# Patient Record
Sex: FEMALE | Race: WHITE | Employment: FULL TIME | URBAN - METROPOLITAN AREA
[De-identification: names, ages, dates, MRNs, and addresses within clinical notes are randomized per-mention and may not be internally consistent; named-entity substitution may affect disease eponyms.]

---

## 2021-03-31 ENCOUNTER — HOSPITAL ENCOUNTER (INPATIENT)
Age: 31
LOS: 11 days | Discharge: HOME | DRG: 177 | End: 2021-04-11
Attending: EMERGENCY MEDICINE | Admitting: INTERNAL MEDICINE
Payer: COMMERCIAL

## 2021-03-31 ENCOUNTER — APPOINTMENT (OUTPATIENT)
Dept: GENERAL RADIOLOGY | Age: 31
DRG: 177 | End: 2021-03-31
Attending: EMERGENCY MEDICINE
Payer: COMMERCIAL

## 2021-03-31 DIAGNOSIS — J45.41 MODERATE PERSISTENT ASTHMA WITH ACUTE EXACERBATION: Primary | ICD-10-CM

## 2021-03-31 DIAGNOSIS — U07.1 COVID-19 VIRUS INFECTION: ICD-10-CM

## 2021-03-31 DIAGNOSIS — R09.02 HYPOXIA: ICD-10-CM

## 2021-03-31 DIAGNOSIS — J18.9 PNEUMONIA OF BOTH LUNGS DUE TO INFECTIOUS ORGANISM, UNSPECIFIED PART OF LUNG: ICD-10-CM

## 2021-03-31 PROBLEM — F41.9 ANXIETY AND DEPRESSION: Status: ACTIVE | Noted: 2021-03-31

## 2021-03-31 PROBLEM — D69.6 THROMBOCYTOPENIA (HCC): Status: ACTIVE | Noted: 2021-03-31

## 2021-03-31 PROBLEM — E03.9 ACQUIRED HYPOTHYROIDISM: Status: ACTIVE | Noted: 2021-03-31

## 2021-03-31 PROBLEM — E87.6 HYPOKALEMIA: Status: ACTIVE | Noted: 2021-03-31

## 2021-03-31 PROBLEM — J45.909 ASTHMA: Status: ACTIVE | Noted: 2021-03-31

## 2021-03-31 PROBLEM — E66.9 OBESITY: Status: ACTIVE | Noted: 2021-03-31

## 2021-03-31 PROBLEM — E78.5 HYPERLIPIDEMIA: Status: ACTIVE | Noted: 2021-03-31

## 2021-03-31 PROBLEM — F32.A ANXIETY AND DEPRESSION: Status: ACTIVE | Noted: 2021-03-31

## 2021-03-31 PROBLEM — J12.82 PNEUMONIA DUE TO COVID-19 VIRUS: Status: ACTIVE | Noted: 2021-03-31

## 2021-03-31 LAB
ANION GAP SERPL CALC-SCNC: 8 MMOL/L (ref 5–15)
BASOPHILS # BLD: 0 K/UL (ref 0–0.1)
BASOPHILS NFR BLD: 0 % (ref 0–1)
BNP SERPL-MCNC: 23 PG/ML
BUN SERPL-MCNC: 11 MG/DL (ref 6–20)
BUN/CREAT SERPL: 11 (ref 12–20)
CALCIUM SERPL-MCNC: 8.4 MG/DL (ref 8.5–10.1)
CHLORIDE SERPL-SCNC: 103 MMOL/L (ref 97–108)
CO2 SERPL-SCNC: 24 MMOL/L (ref 21–32)
COMMENT, HOLDF: NORMAL
CREAT SERPL-MCNC: 1 MG/DL (ref 0.55–1.02)
DIFFERENTIAL METHOD BLD: ABNORMAL
EOSINOPHIL # BLD: 0 K/UL (ref 0–0.4)
EOSINOPHIL NFR BLD: 0 % (ref 0–7)
ERYTHROCYTE [DISTWIDTH] IN BLOOD BY AUTOMATED COUNT: 14 % (ref 11.5–14.5)
GLUCOSE SERPL-MCNC: 87 MG/DL (ref 65–100)
HCT VFR BLD AUTO: 39.3 % (ref 35–47)
HGB BLD-MCNC: 12.5 G/DL (ref 11.5–16)
IMM GRANULOCYTES # BLD AUTO: 0 K/UL (ref 0–0.04)
IMM GRANULOCYTES NFR BLD AUTO: 0 % (ref 0–0.5)
LYMPHOCYTES # BLD: 0.7 K/UL (ref 0.8–3.5)
LYMPHOCYTES NFR BLD: 19 % (ref 12–49)
MCH RBC QN AUTO: 26.7 PG (ref 26–34)
MCHC RBC AUTO-ENTMCNC: 31.8 G/DL (ref 30–36.5)
MCV RBC AUTO: 83.8 FL (ref 80–99)
MONOCYTES # BLD: 0.1 K/UL (ref 0–1)
MONOCYTES NFR BLD: 3 % (ref 5–13)
NEUTS SEG # BLD: 2.9 K/UL (ref 1.8–8)
NEUTS SEG NFR BLD: 78 % (ref 32–75)
NRBC # BLD: 0 K/UL (ref 0–0.01)
NRBC BLD-RTO: 0 PER 100 WBC
PLATELET # BLD AUTO: 141 K/UL (ref 150–400)
POTASSIUM SERPL-SCNC: 3.2 MMOL/L (ref 3.5–5.1)
PROCALCITONIN SERPL-MCNC: 0.05 NG/ML
RBC # BLD AUTO: 4.69 M/UL (ref 3.8–5.2)
RBC MORPH BLD: ABNORMAL
SAMPLES BEING HELD,HOLD: NORMAL
SODIUM SERPL-SCNC: 135 MMOL/L (ref 136–145)
TROPONIN I SERPL-MCNC: <0.05 NG/ML
TSH SERPL DL<=0.05 MIU/L-ACNC: 3.37 UIU/ML (ref 0.36–3.74)
WBC # BLD AUTO: 3.7 K/UL (ref 3.6–11)

## 2021-03-31 PROCEDURE — 65270000029 HC RM PRIVATE

## 2021-03-31 PROCEDURE — 84145 PROCALCITONIN (PCT): CPT

## 2021-03-31 PROCEDURE — 84443 ASSAY THYROID STIM HORMONE: CPT

## 2021-03-31 PROCEDURE — 99218 HC RM OBSERVATION: CPT

## 2021-03-31 PROCEDURE — 85025 COMPLETE CBC W/AUTO DIFF WBC: CPT

## 2021-03-31 PROCEDURE — 96374 THER/PROPH/DIAG INJ IV PUSH: CPT

## 2021-03-31 PROCEDURE — 83880 ASSAY OF NATRIURETIC PEPTIDE: CPT

## 2021-03-31 PROCEDURE — 36415 COLL VENOUS BLD VENIPUNCTURE: CPT

## 2021-03-31 PROCEDURE — 74011250637 HC RX REV CODE- 250/637: Performed by: EMERGENCY MEDICINE

## 2021-03-31 PROCEDURE — 71045 X-RAY EXAM CHEST 1 VIEW: CPT

## 2021-03-31 PROCEDURE — 3E0D73Z INTRODUCTION OF ANTI-INFLAMMATORY INTO MOUTH AND PHARYNX, VIA NATURAL OR ARTIFICIAL OPENING: ICD-10-PCS | Performed by: INTERNAL MEDICINE

## 2021-03-31 PROCEDURE — 93005 ELECTROCARDIOGRAM TRACING: CPT

## 2021-03-31 PROCEDURE — 74011250636 HC RX REV CODE- 250/636: Performed by: EMERGENCY MEDICINE

## 2021-03-31 PROCEDURE — 99285 EMERGENCY DEPT VISIT HI MDM: CPT

## 2021-03-31 PROCEDURE — 96375 TX/PRO/DX INJ NEW DRUG ADDON: CPT

## 2021-03-31 PROCEDURE — 80048 BASIC METABOLIC PNL TOTAL CA: CPT

## 2021-03-31 PROCEDURE — 74011000250 HC RX REV CODE- 250: Performed by: EMERGENCY MEDICINE

## 2021-03-31 PROCEDURE — 84484 ASSAY OF TROPONIN QUANT: CPT

## 2021-03-31 PROCEDURE — 74011636637 HC RX REV CODE- 636/637: Performed by: EMERGENCY MEDICINE

## 2021-03-31 RX ORDER — ENOXAPARIN SODIUM 100 MG/ML
30 INJECTION SUBCUTANEOUS EVERY 12 HOURS
Status: DISCONTINUED | OUTPATIENT
Start: 2021-04-01 | End: 2021-04-02

## 2021-03-31 RX ORDER — PHENTERMINE HYDROCHLORIDE 37.5 MG/1
37.5 TABLET ORAL
COMMUNITY

## 2021-03-31 RX ORDER — ZINC GLUCONATE 50 MG
50 TABLET ORAL DAILY
Status: DISCONTINUED | OUTPATIENT
Start: 2021-04-01 | End: 2021-04-11 | Stop reason: HOSPADM

## 2021-03-31 RX ORDER — ACETAMINOPHEN 325 MG/1
650 TABLET ORAL
Status: DISCONTINUED | OUTPATIENT
Start: 2021-03-31 | End: 2021-04-11 | Stop reason: HOSPADM

## 2021-03-31 RX ORDER — ALBUTEROL SULFATE 90 UG/1
2 AEROSOL, METERED RESPIRATORY (INHALATION)
COMMUNITY

## 2021-03-31 RX ORDER — HYDROXYZINE HYDROCHLORIDE 10 MG/1
10 TABLET, FILM COATED ORAL
Status: DISCONTINUED | OUTPATIENT
Start: 2021-03-31 | End: 2021-04-03

## 2021-03-31 RX ORDER — DEXAMETHASONE 6 MG/1
6 TABLET ORAL DAILY
Status: DISCONTINUED | OUTPATIENT
Start: 2021-04-01 | End: 2021-04-02

## 2021-03-31 RX ORDER — SODIUM CHLORIDE 0.9 % (FLUSH) 0.9 %
5-40 SYRINGE (ML) INJECTION AS NEEDED
Status: DISCONTINUED | OUTPATIENT
Start: 2021-03-31 | End: 2021-04-11 | Stop reason: HOSPADM

## 2021-03-31 RX ORDER — ACETAMINOPHEN 650 MG/1
650 SUPPOSITORY RECTAL
Status: DISCONTINUED | OUTPATIENT
Start: 2021-03-31 | End: 2021-04-11 | Stop reason: HOSPADM

## 2021-03-31 RX ORDER — PROMETHAZINE HYDROCHLORIDE 25 MG/1
12.5 TABLET ORAL
Status: DISCONTINUED | OUTPATIENT
Start: 2021-03-31 | End: 2021-04-11 | Stop reason: HOSPADM

## 2021-03-31 RX ORDER — LEVOTHYROXINE SODIUM 88 UG/1
88 TABLET ORAL
Status: DISCONTINUED | OUTPATIENT
Start: 2021-04-01 | End: 2021-04-04

## 2021-03-31 RX ORDER — ROSUVASTATIN CALCIUM 20 MG/1
20 TABLET, COATED ORAL DAILY
Status: ON HOLD | COMMUNITY
End: 2021-04-11 | Stop reason: SDUPTHER

## 2021-03-31 RX ORDER — KETOROLAC TROMETHAMINE 30 MG/ML
30 INJECTION, SOLUTION INTRAMUSCULAR; INTRAVENOUS ONCE
Status: COMPLETED | OUTPATIENT
Start: 2021-03-31 | End: 2021-03-31

## 2021-03-31 RX ORDER — HYDROXYZINE HYDROCHLORIDE 10 MG/1
10 TABLET, FILM COATED ORAL
COMMUNITY
End: 2021-04-16 | Stop reason: ALTCHOICE

## 2021-03-31 RX ORDER — HYDROCODONE POLISTIREX AND CHLORPHENIRAMINE POLISTIREX 10; 8 MG/5ML; MG/5ML
5 SUSPENSION, EXTENDED RELEASE ORAL
Status: COMPLETED | OUTPATIENT
Start: 2021-03-31 | End: 2021-03-31

## 2021-03-31 RX ORDER — ACETAMINOPHEN 325 MG/1
650 TABLET ORAL
Status: DISCONTINUED | OUTPATIENT
Start: 2021-03-31 | End: 2021-03-31 | Stop reason: SDUPTHER

## 2021-03-31 RX ORDER — IPRATROPIUM BROMIDE AND ALBUTEROL SULFATE 2.5; .5 MG/3ML; MG/3ML
3 SOLUTION RESPIRATORY (INHALATION)
COMMUNITY

## 2021-03-31 RX ORDER — PREDNISONE 20 MG/1
100 TABLET ORAL
Status: DISCONTINUED | OUTPATIENT
Start: 2021-04-01 | End: 2021-03-31

## 2021-03-31 RX ORDER — PREDNISONE 20 MG/1
60 TABLET ORAL ONCE
Status: COMPLETED | OUTPATIENT
Start: 2021-03-31 | End: 2021-03-31

## 2021-03-31 RX ORDER — ROSUVASTATIN CALCIUM 10 MG/1
20 TABLET, COATED ORAL DAILY
Status: DISCONTINUED | OUTPATIENT
Start: 2021-04-01 | End: 2021-04-11 | Stop reason: HOSPADM

## 2021-03-31 RX ORDER — ONDANSETRON 2 MG/ML
4 INJECTION INTRAMUSCULAR; INTRAVENOUS
Status: DISCONTINUED | OUTPATIENT
Start: 2021-03-31 | End: 2021-04-11 | Stop reason: HOSPADM

## 2021-03-31 RX ORDER — PHENTERMINE HYDROCHLORIDE 37.5 MG/1
37.5 CAPSULE ORAL
COMMUNITY
End: 2021-03-31

## 2021-03-31 RX ORDER — ASCORBIC ACID 500 MG
500 TABLET ORAL DAILY
Status: DISCONTINUED | OUTPATIENT
Start: 2021-04-01 | End: 2021-04-11 | Stop reason: HOSPADM

## 2021-03-31 RX ORDER — FLUTICASONE PROPIONATE AND SALMETEROL 500; 50 UG/1; UG/1
1 POWDER RESPIRATORY (INHALATION) EVERY 12 HOURS
COMMUNITY

## 2021-03-31 RX ORDER — LEVOTHYROXINE SODIUM 88 UG/1
88 TABLET ORAL
COMMUNITY

## 2021-03-31 RX ORDER — ACETAMINOPHEN 650 MG/1
650 SUPPOSITORY RECTAL
Status: DISCONTINUED | OUTPATIENT
Start: 2021-03-31 | End: 2021-03-31 | Stop reason: SDUPTHER

## 2021-03-31 RX ORDER — POTASSIUM CHLORIDE 750 MG/1
40 TABLET, FILM COATED, EXTENDED RELEASE ORAL
Status: COMPLETED | OUTPATIENT
Start: 2021-03-31 | End: 2021-03-31

## 2021-03-31 RX ORDER — ACETAMINOPHEN 500 MG
1000 TABLET ORAL ONCE
Status: COMPLETED | OUTPATIENT
Start: 2021-03-31 | End: 2021-03-31

## 2021-03-31 RX ORDER — SODIUM CHLORIDE 0.9 % (FLUSH) 0.9 %
5-40 SYRINGE (ML) INJECTION EVERY 8 HOURS
Status: DISCONTINUED | OUTPATIENT
Start: 2021-04-01 | End: 2021-04-11 | Stop reason: HOSPADM

## 2021-03-31 RX ORDER — POLYETHYLENE GLYCOL 3350 17 G/17G
17 POWDER, FOR SOLUTION ORAL DAILY PRN
Status: DISCONTINUED | OUTPATIENT
Start: 2021-03-31 | End: 2021-04-11 | Stop reason: HOSPADM

## 2021-03-31 RX ADMIN — CEFTRIAXONE 1 G: 1 INJECTION, POWDER, FOR SOLUTION INTRAMUSCULAR; INTRAVENOUS at 22:29

## 2021-03-31 RX ADMIN — KETOROLAC TROMETHAMINE 30 MG: 30 INJECTION, SOLUTION INTRAMUSCULAR at 19:06

## 2021-03-31 RX ADMIN — AZITHROMYCIN MONOHYDRATE 500 MG: 500 INJECTION, POWDER, LYOPHILIZED, FOR SOLUTION INTRAVENOUS at 22:36

## 2021-03-31 RX ADMIN — SODIUM CHLORIDE 1000 ML: 9 INJECTION, SOLUTION INTRAVENOUS at 19:09

## 2021-03-31 RX ADMIN — POTASSIUM CHLORIDE 40 MEQ: 750 TABLET, EXTENDED RELEASE ORAL at 20:21

## 2021-03-31 RX ADMIN — HYDROCODONE POLISTIREX AND CHLORPHENIRAMINE POLISTIREX 5 ML: 10; 8 SUSPENSION, EXTENDED RELEASE ORAL at 19:26

## 2021-03-31 RX ADMIN — PREDNISONE 60 MG: 20 TABLET ORAL at 20:20

## 2021-03-31 RX ADMIN — ACETAMINOPHEN 1000 MG: 500 TABLET, FILM COATED ORAL at 19:05

## 2021-03-31 NOTE — Clinical Note
Status[de-identified] INPATIENT [101]   Type of Bed: Telemetry [19]   Inpatient Hospitalization Certified Necessary for the Following Reasons: 3.  Patient receiving treatment that can only be provided in an inpatient setting (further clarification in H&P documentation)   Admitting Diagnosis: Pneumonia due to COVID-19 virus [[id number]]   Admitting Physician: Leni Valdivia, [address]   Attending Physician: Shawn Echeverria   Estimated Length of Stay: 3-4 Midnights   Discharge Plan[de-identified] Home with Office Follow-up

## 2021-03-31 NOTE — ED TRIAGE NOTES
Patient presents to ED via EMS for c/o chest discomfort. Pain is mid chest and nonradiating. Tested positive for COVID on Monday. Reports loss of taste/smell. Has used nebulizer with relief yesterday but not today.  Reports similar sx with pneumonia in past.

## 2021-04-01 PROBLEM — U07.1 COVID-19: Status: ACTIVE | Noted: 2021-04-01

## 2021-04-01 LAB
ANION GAP SERPL CALC-SCNC: 8 MMOL/L (ref 5–15)
BUN SERPL-MCNC: 12 MG/DL (ref 6–20)
BUN/CREAT SERPL: 13 (ref 12–20)
CALCIUM SERPL-MCNC: 8.5 MG/DL (ref 8.5–10.1)
CHLORIDE SERPL-SCNC: 105 MMOL/L (ref 97–108)
CO2 SERPL-SCNC: 23 MMOL/L (ref 21–32)
CREAT SERPL-MCNC: 0.9 MG/DL (ref 0.55–1.02)
CRP SERPL-MCNC: 6.75 MG/DL (ref 0–0.6)
D DIMER PPP FEU-MCNC: 0.76 MG/L FEU (ref 0–0.65)
ERYTHROCYTE [DISTWIDTH] IN BLOOD BY AUTOMATED COUNT: 14 % (ref 11.5–14.5)
FERRITIN SERPL-MCNC: 370 NG/ML (ref 26–388)
GLUCOSE SERPL-MCNC: 138 MG/DL (ref 65–100)
HCT VFR BLD AUTO: 42.2 % (ref 35–47)
HGB BLD-MCNC: 13.2 G/DL (ref 11.5–16)
LDH SERPL L TO P-CCNC: 348 U/L (ref 81–246)
MCH RBC QN AUTO: 26.5 PG (ref 26–34)
MCHC RBC AUTO-ENTMCNC: 31.3 G/DL (ref 30–36.5)
MCV RBC AUTO: 84.7 FL (ref 80–99)
NRBC # BLD: 0 K/UL (ref 0–0.01)
NRBC BLD-RTO: 0 PER 100 WBC
PLATELET # BLD AUTO: 146 K/UL (ref 150–400)
PMV BLD AUTO: 11 FL (ref 8.9–12.9)
POTASSIUM SERPL-SCNC: 4 MMOL/L (ref 3.5–5.1)
RBC # BLD AUTO: 4.98 M/UL (ref 3.8–5.2)
SARS-COV-2, COV2: NORMAL
SARS-COV-2, XPLCVT: DETECTED
SODIUM SERPL-SCNC: 136 MMOL/L (ref 136–145)
SOURCE, COVRS: ABNORMAL
TROPONIN I SERPL-MCNC: <0.05 NG/ML
WBC # BLD AUTO: 2.8 K/UL (ref 3.6–11)

## 2021-04-01 PROCEDURE — 96361 HYDRATE IV INFUSION ADD-ON: CPT

## 2021-04-01 PROCEDURE — 74011250637 HC RX REV CODE- 250/637: Performed by: INTERNAL MEDICINE

## 2021-04-01 PROCEDURE — 74011250636 HC RX REV CODE- 250/636: Performed by: INTERNAL MEDICINE

## 2021-04-01 PROCEDURE — 80048 BASIC METABOLIC PNL TOTAL CA: CPT

## 2021-04-01 PROCEDURE — 96376 TX/PRO/DX INJ SAME DRUG ADON: CPT

## 2021-04-01 PROCEDURE — 94640 AIRWAY INHALATION TREATMENT: CPT

## 2021-04-01 PROCEDURE — 65270000029 HC RM PRIVATE

## 2021-04-01 PROCEDURE — U0003 INFECTIOUS AGENT DETECTION BY NUCLEIC ACID (DNA OR RNA); SEVERE ACUTE RESPIRATORY SYNDROME CORONAVIRUS 2 (SARS-COV-2) (CORONAVIRUS DISEASE [COVID-19]), AMPLIFIED PROBE TECHNIQUE, MAKING USE OF HIGH THROUGHPUT TECHNOLOGIES AS DESCRIBED BY CMS-2020-01-R: HCPCS

## 2021-04-01 PROCEDURE — 99218 HC RM OBSERVATION: CPT

## 2021-04-01 PROCEDURE — 94664 DEMO&/EVAL PT USE INHALER: CPT

## 2021-04-01 PROCEDURE — 84484 ASSAY OF TROPONIN QUANT: CPT

## 2021-04-01 PROCEDURE — 93005 ELECTROCARDIOGRAM TRACING: CPT

## 2021-04-01 PROCEDURE — 86140 C-REACTIVE PROTEIN: CPT

## 2021-04-01 PROCEDURE — 36415 COLL VENOUS BLD VENIPUNCTURE: CPT

## 2021-04-01 PROCEDURE — 96372 THER/PROPH/DIAG INJ SC/IM: CPT

## 2021-04-01 PROCEDURE — 82728 ASSAY OF FERRITIN: CPT

## 2021-04-01 PROCEDURE — 74011250637 HC RX REV CODE- 250/637: Performed by: EMERGENCY MEDICINE

## 2021-04-01 PROCEDURE — 85027 COMPLETE CBC AUTOMATED: CPT

## 2021-04-01 PROCEDURE — 94618 PULMONARY STRESS TESTING: CPT

## 2021-04-01 PROCEDURE — 83615 LACTATE (LD) (LDH) ENZYME: CPT

## 2021-04-01 PROCEDURE — 85379 FIBRIN DEGRADATION QUANT: CPT

## 2021-04-01 RX ORDER — HYDROCODONE BITARTRATE AND ACETAMINOPHEN 5; 325 MG/1; MG/1
1 TABLET ORAL
Status: DISCONTINUED | OUTPATIENT
Start: 2021-04-01 | End: 2021-04-01

## 2021-04-01 RX ORDER — MORPHINE SULFATE 15 MG/1
15 TABLET ORAL
Status: DISCONTINUED | OUTPATIENT
Start: 2021-04-01 | End: 2021-04-11 | Stop reason: HOSPADM

## 2021-04-01 RX ORDER — HYDROCODONE POLISTIREX AND CHLORPHENIRAMINE POLISTIREX 10; 8 MG/5ML; MG/5ML
5 SUSPENSION, EXTENDED RELEASE ORAL
Status: DISCONTINUED | OUTPATIENT
Start: 2021-04-01 | End: 2021-04-01

## 2021-04-01 RX ORDER — KETOROLAC TROMETHAMINE 30 MG/ML
30 INJECTION, SOLUTION INTRAMUSCULAR; INTRAVENOUS EVERY 6 HOURS
Status: DISCONTINUED | OUTPATIENT
Start: 2021-04-01 | End: 2021-04-03

## 2021-04-01 RX ORDER — BENZONATATE 100 MG/1
100 CAPSULE ORAL
Status: DISCONTINUED | OUTPATIENT
Start: 2021-04-01 | End: 2021-04-11 | Stop reason: HOSPADM

## 2021-04-01 RX ORDER — SODIUM CHLORIDE 9 MG/ML
75 INJECTION, SOLUTION INTRAVENOUS CONTINUOUS
Status: DISPENSED | OUTPATIENT
Start: 2021-04-01 | End: 2021-04-02

## 2021-04-01 RX ORDER — GUAIFENESIN/DEXTROMETHORPHAN 100-10MG/5
5 SYRUP ORAL
Status: DISCONTINUED | OUTPATIENT
Start: 2021-04-01 | End: 2021-04-11 | Stop reason: HOSPADM

## 2021-04-01 RX ADMIN — OXYCODONE HYDROCHLORIDE AND ACETAMINOPHEN 500 MG: 500 TABLET ORAL at 09:33

## 2021-04-01 RX ADMIN — LEVOTHYROXINE SODIUM 88 MCG: 0.09 TABLET ORAL at 06:04

## 2021-04-01 RX ADMIN — IPRATROPIUM BROMIDE AND ALBUTEROL 1 PUFF: 20; 100 SPRAY, METERED RESPIRATORY (INHALATION) at 23:23

## 2021-04-01 RX ADMIN — DEXAMETHASONE 6 MG: 6 TABLET ORAL at 00:18

## 2021-04-01 RX ADMIN — Medication 50 MG: at 09:33

## 2021-04-01 RX ADMIN — ROSUVASTATIN CALCIUM 20 MG: 10 TABLET, COATED ORAL at 09:33

## 2021-04-01 RX ADMIN — ENOXAPARIN SODIUM 30 MG: 30 INJECTION SUBCUTANEOUS at 12:52

## 2021-04-01 RX ADMIN — KETOROLAC TROMETHAMINE 30 MG: 30 INJECTION, SOLUTION INTRAMUSCULAR at 18:51

## 2021-04-01 RX ADMIN — ENOXAPARIN SODIUM 30 MG: 30 INJECTION SUBCUTANEOUS at 00:18

## 2021-04-01 RX ADMIN — IPRATROPIUM BROMIDE AND ALBUTEROL 1 PUFF: 20; 100 SPRAY, METERED RESPIRATORY (INHALATION) at 07:55

## 2021-04-01 RX ADMIN — IPRATROPIUM BROMIDE AND ALBUTEROL 1 PUFF: 20; 100 SPRAY, METERED RESPIRATORY (INHALATION) at 11:54

## 2021-04-01 RX ADMIN — IPRATROPIUM BROMIDE AND ALBUTEROL 1 PUFF: 20; 100 SPRAY, METERED RESPIRATORY (INHALATION) at 15:45

## 2021-04-01 RX ADMIN — Medication 10 ML: at 20:52

## 2021-04-01 RX ADMIN — ACETAMINOPHEN 650 MG: 325 TABLET, FILM COATED ORAL at 17:55

## 2021-04-01 RX ADMIN — IPRATROPIUM BROMIDE AND ALBUTEROL 1 PUFF: 20; 100 SPRAY, METERED RESPIRATORY (INHALATION) at 05:43

## 2021-04-01 RX ADMIN — BENZONATATE 100 MG: 100 CAPSULE ORAL at 17:55

## 2021-04-01 RX ADMIN — Medication 10 ML: at 17:56

## 2021-04-01 RX ADMIN — SODIUM CHLORIDE 75 ML/HR: 9 INJECTION, SOLUTION INTRAVENOUS at 20:43

## 2021-04-01 RX ADMIN — HYDROCODONE BITARTRATE AND ACETAMINOPHEN 1 TABLET: 5; 325 TABLET ORAL at 18:51

## 2021-04-01 RX ADMIN — Medication 10 ML: at 06:05

## 2021-04-01 RX ADMIN — Medication 10 ML: at 00:19

## 2021-04-01 NOTE — H&P
[address]  [phone]    Admission History and Physical      NAME:  Akira Nguyen   :   1990   MRN:  [id number]     PCP:  Dennis Hernandez MD     Date of service:  3/31/2021         Subjective:     CHIEF COMPLAINT: Cough and shortness of breath    HISTORY OF PRESENT ILLNESS:     Ms. Candance Columbia is a 27 y.o.  female who is admitted with Covid pneumonia. Ms. Candance Columbia with past medical history of asthma, hyperlipidemia, anxiety/depression, hypothyroidism, obesity presented to ER complaining of chest tightness and shortness of breath which started about a week ago. Patient was tested positive for SARS-CoV-2 on 2021. Patient has been having shortness of breath and cough before she tested positive for SARS-CoV-2, which has been progressively worse. In ER, SaO2 is mostly in the mid 90s and occasionally low 90s. No past medical history on file. No past surgical history on file. Social History     Tobacco Use    Smoking status: Not on file   Substance Use Topics    Alcohol use: Not on file        No family history on file. Allergies   Allergen Reactions    Codeine Nausea and Vomiting        Prior to Admission medications    Medication Sig Start Date End Date Taking? Authorizing Provider   fluticasone propion-salmeteroL (Wixela Inhub) 500-50 mcg/dose diskus inhaler Take 1 Puff by inhalation every twelve (12) hours. Yes Provider, Historical   hydrOXYzine HCL (ATARAX) 10 mg tablet Take 10 mg by mouth two (2) times daily as needed for Anxiety. Yes Provider, Historical   albuterol-ipratropium (DUO-NEB) 2.5 mg-0.5 mg/3 ml nebu 3 mL by Nebulization route every four (4) hours as needed for Wheezing. Yes Provider, Historical   phentermine (ADIPEX-P) 37.5 mg tablet Take 37.5 mg by mouth every morning.    Yes Provider, Historical   levothyroxine (SYNTHROID) 88 mcg tablet Take 88 mcg by mouth Daily (before breakfast). Yes Provider, Historical   albuterol (PROVENTIL HFA, VENTOLIN HFA, PROAIR HFA) 90 mcg/actuation inhaler Take 2 Puffs by inhalation every six (6) hours as needed for Wheezing. Yes Provider, Historical   rosuvastatin calcium (CRESTOR PO) Take  by mouth daily.  Unknown dose   Yes Provider, Historical         Review of Systems:  (bold if positive, if negative)    Gen:  Eyes:  ENT:  CVS:  Pulm:  Cough, dyspneaGI:  :  MS:  Skin:  Psych:  Endo:  Hem:  Renal:  Neuro:            Objective:      VITALS:    Vital signs reviewed; most recent are:    Visit Vitals  /64   Pulse 97   Temp 99.2 °F (37.3 °C)   Resp 16   Ht 5' 3\" (1.6 m)   Wt 140.4 kg (309 lb 8.4 oz)   SpO2 91%   BMI 54.83 kg/m²     SpO2 Readings from Last 6 Encounters:   03/31/21 91%        No intake or output data in the 24 hours ending 03/31/21 2231         Exam:     Physical Exam:    Gen:  Well-developed, well-nourished, in no acute distress  HEENT:  Pink conjunctivae, PERRL, hearing intact to voice, moist mucous membranes  Neck:  Supple, without masses, thyroid non-tender  Resp:  No accessory muscle use, clear breath sounds without wheezes rales or rhonchi  Card:  No murmurs, normal S1, S2 without thrills, bruits or peripheral edema  Abd:  Soft, non-tender, non-distended, normoactive bowel sounds are present, no palpable organomegaly and no detectable hernias  Lymph:  No cervical or inguinal adenopathy  Musc:  No cyanosis or clubbing  Skin:  No rashes or ulcers, skin turgor is good  Neuro:  Cranial nerves are grossly intact, no focal motor weakness, follows commands appropriately  Psych:  Good insight, oriented to person, place and time, alert       Labs:    Recent Labs     03/31/21  1904   WBC 3.7   HGB 12.5   HCT 39.3   *     Recent Labs     03/31/21  1904   *   K 3.2*      CO2 24   GLU 87   BUN 11   CREA 1.00   CA 8.4*     No results found for: GLUCPOC  No results for input(s): PH, PCO2, PO2, HCO3, FIO2 in the last 72 hours. No results for input(s): INR, INREXT in the last 72 hours. Telemetry reviewed:   normal sinus rhythm       Assessment/Plan:    1. Pneumonia due to COVID-19 virus (3/31/2021). Admit to medical.  Currently not a candidate for remdesivir. Start dexamethasone, vitamin C, zinc and monitor clinically. If continues to be hypoxic may need to start remdesivir. Check inflammatory markers    2. Hypokalemia (3/31/2021). Replete    3. Hyperlipidemia (3/31/2021). Continue Crestor    4. Acquired hypothyroidism (3/31/2021). Continue Synthroid and check TSH    5. Thrombocytopenia ([facility] [address] (3/31/2021). Likely secondary to Covid. Monitor    6. Asthma (3/31/2021). Stable. Not wheezing. Start Combivent inhaler    7. Obesity (3/31/2021). Would benefit from weight loss    8. Anxiety and depression (3/31/2021). Continue Lexapro.          Previous medical records reviewed     Risk of deterioration: high      Total time spent with patient: 79 [facility] discussed with: Patient, Nursing Staff and >50% of time spent in counseling and coordination of care    Discussed:  Care Plan    Prophylaxis:  Lovenox    Probable Disposition:  Home w/Family           ___________________________________________________    Attending Physician: Thomas Paredes MD

## 2021-04-01 NOTE — PROGRESS NOTES
[facility]: MED RECONCILIATION    Comments/Recommendations:   Medication reconciliation completed by patient over the phone. Patient unsure of Crestor dose, pharmacy will confirm tomorrow with [facility], they are currently closed. Medications added:     Wixela inhaler  Hydroxyzine  Duo-Neb  Phentermine  Albuterol inhaler  Crestor  levothyroxine    Medications removed:    none    Medications adjusted:    none    Information obtained from: patient, Rx query    Allergies: Codeine    Prior to Admission Medications:     Medication Documentation Review Audit       Reviewed by Pipe Bradley (Pharmacist) on 03/31/21 at 2157      Medication Sig Documenting Provider Last Dose Status Taking? albuterol (PROVENTIL HFA, VENTOLIN HFA, PROAIR HFA) 90 mcg/actuation inhaler Take 2 Puffs by inhalation every six (6) hours as needed for Wheezing. Provider, Historical  Active Yes   albuterol-ipratropium (DUO-NEB) 2.5 mg-0.5 mg/3 ml nebu 3 mL by Nebulization route every four (4) hours as needed for Wheezing. Provider, Historical 3/31/2021 AM Active Yes   fluticasone propion-salmeteroL (Wixela Inhub) 500-50 mcg/dose diskus inhaler Take 1 Puff by inhalation every twelve (12) hours. Provider, Historical  Active Yes   hydrOXYzine HCL (ATARAX) 10 mg tablet Take 10 mg by mouth two (2) times daily as needed for Anxiety. Provider, Historical  Active Yes   levothyroxine (SYNTHROID) 88 mcg tablet Take 88 mcg by mouth Daily (before breakfast). Provider, Historical 3/30/2021 Active Yes   phentermine (ADIPEX-P) 37.5 mg tablet Take 37.5 mg by mouth every morning. Provider, Historical 3/24/2021 Active Yes           Med Note (PIPE ALICIA   Wed Mar 31, 2021  9:55 PM) Patient stopped taking when became sick, medication currently on hold, to follow-up with MD in [facility] prior to resuming. rosuvastatin calcium (CRESTOR PO) Take  by mouth daily.  Unknown dose Provider, Historical  Active Yes           Med Note (PIPE ALICIA   Wed Mar 31, 2021  9:57 PM) Pharmacy will follow-up with tomorrow to confirm dose with outpatient pharmacy.                      Thank you,   Pipe Alicia, PharmD, BCPS   Contact: [phone]

## 2021-04-01 NOTE — PROGRESS NOTES
Bedside and Verbal shift change report given to brian (oncoming nurse) by [employer] (offgoing nurse). Report included the following information SBAR, Kardex and MAR.

## 2021-04-01 NOTE — PROGRESS NOTES
Reason for Admission:  PNA due to COVID-19                    RUR Score: 9% LOW                  Plan for utilizing home health: Not appropriate at this time         PCP: First and Last name: Pt has a PCP back home in [place]- plans to follow up once she returns. CM inquired about getting pt set up with [facility] at time of discharge, pt agreeable if her insurance will cover it. CM reached out to [facility] and they are contacting patient to run benefits and set up a visit if insurance does cover. Current Advanced Directive/Advance Care Plan: Full Code- none on file, pt is not interested in arranging any at this time. Healthcare Decision Maker:   Updated to reflect information obtained                  Transition of Care Plan:                    Pt is a 27year old,  female, admitted with PNA due to COVD-19. CM spoke with pt via phone due to COVID-19 precautions- pt AOX4 confirming address, emergency contact and PCP. Pt states she lives in [place] and is completely independent with ADLS, drives at baseline and has no DME. Pt states she has not had HH or been to any SNF/IPR in the past. Pt states she has not been in the hospital in two plus years. Pt confirmed [facility]- states no problems affording or accessing medications. Pt states her Mom will be able to drive her home at time of discharge and as needed. Pt confirms address being [place]- states she has been up here in [facility] with her son, Mom and Dad as her Grandma  and they have been trying to clear her house out and staying there. Pt and pt's family are not able to return to [place] until pt is COVID-19 negative and not symptomatic, pt states her parents are now vaccinated and she is the only one who is sick. Pt wishes to return home with family at time of discharge. Pt remains on Med. Surg- COVID-19 positive being treated for PNA- anticipate pt to not have any needs at time of discharge- MARIA INES Blanchard updated and will continue to follow and assist as needed. Care Management Interventions  PCP Verified by CM:  Yes  Mode of Transport at Discharge: Self  Transition of Care Consult (CM Consult): Discharge Planning  MyChart Signup: No  Discharge Durable Medical Equipment: No  Physical Therapy Consult: No  Occupational Therapy Consult: No  Current Support Network: Relative's Home  Confirm Follow Up Transport: Family  The Patient and/or Patient Representative was Provided with a Choice of Provider and Agrees with the Discharge Plan?: Yes  Freedom of Choice List was Provided with Basic Dialogue that Supports the Patient's Individualized Plan of Care/Goals, Treatment Preferences and Shares the Quality Data Associated with the Providers?: Yes  Discharge Location  Discharge Placement: Home with family assistance     RIVAS Olivo, [address]

## 2021-04-01 NOTE — PROGRESS NOTES
04/01/21 1155   Chart and Patient  Assessment   Pulmonary History 3   Surgical History 0   Chest X-ray 2   Respiratory Pattern 0   Mental Status 0   Breath Sounds 2   Cough 0   Level of Activity/Mobility 0   Respiratory Assessment Total Score 7

## 2021-04-01 NOTE — PROGRESS NOTES
Bedside shift change report given to [employer] (oncoming nurse) by Jasmina Melissa (offgoing nurse). Report included the following information SBAR, Kardex, Intake/Output, MAR and Recent Results.

## 2021-04-01 NOTE — ACP (ADVANCE CARE PLANNING)
Advance Care Planning     Advance Care Planning Clinical Specialist  Conversation Note      Date of ACP Conversation: 3/31/2021    Conversation Conducted with:  Patient with Decision Making Capacity     ACP Clinical Specialist: [address] [place] [address] Decision Maker:    Current Designated Health Care Decision Maker:   Primary Decision Maker: Rick Bowles - Mother - [phone]    Primary Decision Maker: Marah Jeronimo - Father - [phone]      Pt declined to complete the rest of questionnaire due to medical stability stating her parents are aware of her wishes. Care Preferences    Hospitalization: \"If your health worsens and it becomes clear that your chance of recovery is unlikely, what would your preference be regarding hospitalization? \"    Choice:  [x]  The patient wants hospitalization  []  The patient prefers comfort-focused treatment without hospitalization. Ventilation: \"If you were in your present state of health and suddenly became very ill and were unable to breathe on your own, what would your preference be about the use of a ventilator (breathing machine) if it were available to you? \"      If patient would desire the use of a ventilator (breathing machine), answer \"yes\", if not \"no\":    \"If your health worsens and it becomes clear that your chance of recovery is unlikely, what would your preference be about the use of a ventilator (breathing machine) if it were available to you? \"     If patient would desire the use of a ventilator (breathing machine), answer \"yes\", if not \"no\"      Resuscitation  \"CPR works best to restart the heart when there is a sudden event, like a heart attack, in someone who is otherwise healthy. Unfortunately, CPR does not typically restart the heart for people who have serious health conditions or who are very sick. \"    \"In the event your heart stopped as a result of an underlying serious health condition, would you want attempts to be made to restart your heart (answer \"yes\" for attempt to resuscitate) or would you prefer a natural death (answer \"no\" for do not attempt to resuscitate)? \"     [x] Yes  [] No   Educated Patient / Abraham Singh regarding differences between Advance Directives and portable DNR orders.     Length of ACP Conversation in minutes:      Conversation Outcomes:  [] ACP discussion completed  [] Existing advance directive reviewed with patient; no changes to patient's previously recorded wishes   [] New Advance Directive completed   [] Portable Do Not Rescitate prepared for Provider review and signature  [] POLST/POST/MOLST/MOST prepared for Provider review and signature    Follow-up plan:    [] Schedule follow-up conversation to continue planning  [] Referred individual to Provider for additional questions/concerns   [] Advised patient/agent/surrogate to review completed ACP document and update if needed with changes in condition, patient preferences or care setting     [] This note routed to one or more involved healthcare providers    Dino Perez, MSW, [address]

## 2021-04-01 NOTE — PROGRESS NOTES
04/01/21 1445 04/01/21 1447 04/01/21 1449   RT Walking Oximetry   Stage Resting (Room Air) During Walk (Room Air) During Walk (Room Air)   SpO2 100 % 96 % 97 %   HR 94 bpm 120 bpm 129 bpm   Rate of Dyspnea 1 1 1   Symptoms Chest Pain  (coughing) Chest Pain  (coughing) Chest Pain;Dizziness  (coughing)   O2 Device None (Room air) None (Room air) None (Room air)      04/01/21 1451   RT Walking Oximetry   Stage After Walk   SpO2 95 %    bpm   Rate of Dyspnea 1   Symptoms   (coughing)   O2 Device None (Room air)

## 2021-04-01 NOTE — ED PROVIDER NOTES
Pt is a 26 yo female with hx of asthma who presents with wheezing, CP, SOB. Was diagnosed with COVID 19 earlier this week. Reports she has been hospitalized several times for asthma exacerbations, last episode was last year in [place]. Has been using her rescue inhaler and nebulizer at home. Helped yesterday but did not seem to be working today prompting ED visit. Does not have a pulse ox at home, she is visiting family and came to [facility] 3 weeks ago. C/o mid sternal CP, fevers. Has been taking tylenol at home for fevers. No past medical history on file. No past surgical history on file. No family history on file.     Social History     Socioeconomic History    Marital status:      Spouse name: Not on file    Number of children: Not on file    Years of education: Not on file    Highest education level: Not on file   Occupational History    Not on file   Social Needs    Financial resource strain: Not on file    Food insecurity     Worry: Not on file     Inability: Not on file    Transportation needs     Medical: Not on file     Non-medical: Not on file   Tobacco Use    Smoking status: Not on file   Substance and Sexual Activity    Alcohol use: Not on file    Drug use: Not on file    Sexual activity: Not on file   Lifestyle    Physical activity     Days per week: Not on file     Minutes per session: Not on file    Stress: Not on file   Relationships    Social connections     Talks on phone: Not on file     Gets together: Not on file     Attends Orthodox service: Not on file     Active member of club or organization: Not on file     Attends meetings of clubs or organizations: Not on file     Relationship status: Not on file    Intimate partner violence     Fear of current or ex partner: Not on file     Emotionally abused: Not on file     Physically abused: Not on file     Forced sexual activity: Not on file   Other Topics Concern    Not on file   Social History Narrative  Not on file         ALLERGIES: Codeine    Review of Systems   Constitutional: Positive for fatigue and fever. Negative for chills. HENT: Negative for drooling and nosebleeds. Eyes: Negative for pain and itching. Respiratory: Positive for cough, shortness of breath and wheezing. Negative for choking and stridor. Cardiovascular: Negative for leg swelling. Gastrointestinal: Negative for abdominal distention and rectal pain. Endocrine: Negative for heat intolerance and polyphagia. Genitourinary: Negative for enuresis and genital sores. Musculoskeletal: Negative for arthralgias and joint swelling. Skin: Negative for color change. Allergic/Immunologic: Negative for immunocompromised state. Neurological: Negative for tremors and speech difficulty. Hematological: Negative for adenopathy. Psychiatric/Behavioral: Negative for dysphoric mood and sleep disturbance. Vitals:    03/31/21 2000 03/31/21 2015 03/31/21 2030 03/31/21 2134   BP: 92/62 (!) 103/58 (!) 110/52    Pulse: 96 95 100    Resp: 15 16 20    Temp:       SpO2: 91%  93% (!) 89%   Weight:       Height:                Physical Exam  Vitals signs and nursing note reviewed. Constitutional:       Appearance: She is well-developed. She is ill-appearing. She is not toxic-appearing or diaphoretic. HENT:      Head: Normocephalic. Nose: Nose normal.      Mouth/Throat:      Mouth: Mucous membranes are moist.      Pharynx: Oropharynx is clear. Eyes:      Conjunctiva/sclera: Conjunctivae normal.   Neck:      Musculoskeletal: Normal range of motion and neck supple. Cardiovascular:      Rate and Rhythm: Regular rhythm. Heart sounds: Normal heart sounds. Pulmonary:      Effort: No respiratory distress. Breath sounds: Wheezing present. Comments: Mild increased WOB. Speaking in full sentences. Abdominal:      General: There is no distension. Palpations: Abdomen is soft.    Musculoskeletal: Normal range of motion. General: No deformity. Skin:     General: Skin is warm and dry. Neurological:      Mental Status: She is alert and oriented to person, place, and time. Coordination: Coordination normal.   Psychiatric:         Behavior: Behavior normal.          MDM  Number of Diagnoses or Management Options  COVID-19 virus infection  Hypoxia  Moderate persistent asthma with acute exacerbation  Pneumonia of both lungs due to infectious organism, unspecified part of lung  Diagnosis management comments: Pt with improved symptoms after meds in ED. Noted to be hypoxic and IV abx given with PNA noted. Received steroids for asthma exacerbation (pt reports she always needs steroids when her asthma acts up), and admitted for further management. ED Course as of Apr 01 1648   Wed Mar 31, 2021   2144 ED EKG interpretation:  Rhythm: normal sinus rhythm; and regular . Rate (approx.): 108; Axis: normal; ST/T wave: non-specific changes; No STEMI, occasional PVC. [AL]      ED Course User Index  [AL] Jessiac Sue MD       Procedures    Perfect Serve Consult for Admission  9:40 PM    ED Room Number: ZF20/05  Patient Name and age:  Kelby Rudd 27 y.o.  female  Working Diagnosis:   1. Moderate persistent asthma with acute exacerbation    2. COVID-19 virus infection    3. Pneumonia of both lungs due to infectious organism, unspecified part of lung    4. Hypoxia        COVID-19 Suspicion:  yes  Sepsis present:  no  Reassessment needed: no  Code Status:  Full Code  Readmission: no  Isolation Requirements:  yes  Recommended Level of Care:  telemetry  Department:[facility] ED - [phone]  Other:  Hx of prior hospitalizations for asthma. From [place]. Hypoxic with PNA on CXR. Patient is being admitted to the hospital.  The results of their tests and reasons for their admission have been discussed with them and/or available family.   They convey agreement and understanding for the need to be admitted and for their admission diagnosis.

## 2021-04-01 NOTE — ED NOTES
TRANSFER - OUT REPORT:    Verbal report given to 5th floor RN(name) on Freddy Curtis  being transferred to 526(unit) for routine progression of care       Report consisted of patients Situation, Background, Assessment and   Recommendations(SBAR). Information from the following report(s) SBAR, Kardex, ED Summary, MAR, Recent Results and Cardiac Rhythm NSR was reviewed with the receiving nurse. Lines:   Peripheral IV 03/31/21 Right Hand (Active)        Opportunity for questions and clarification was provided.       Patient transported with:   Registered Nurse

## 2021-04-01 NOTE — PROGRESS NOTES
Andrés Orellana [facility]  [address], [place], [address] [facility]  [phone]      Medical Progress Note      NAME: Marion Jacobs   :  1990  MRM:  [id number]    Date/Time: 2021  7:34 PM         Subjective:     Chief Complaint:  \"I feel bad\"     Pt seen and examined. Complains of chest pain 2/2 coughing. Little improvement with tessalon pearls. Though did not desat with eval for hypoxia, did have dizziness and chest pain as well as cough     ROS:  (bold if positive, if negative)      SOB   CP  Cough        Objective:       Vitals:          Last 24hrs VS reviewed since prior progress note. Most recent are:    Visit Vitals  BP (!) 164/66 (BP 1 Location: Left upper arm, BP Patient Position: At rest)   Pulse (!) 115   Temp (!) 102.5 °F (39.2 °C)   Resp 20   Ht 5' 3\" (1.6 m)   Wt 140.4 kg (309 lb 8.4 oz)   SpO2 94%   BMI 54.83 kg/m²     SpO2 Readings from Last 6 Encounters:   21 94%            Intake/Output Summary (Last 24 hours) at 2021 1934  Last data filed at 2021 1400  Gross per 24 hour   Intake 650 ml   Output 400 ml   Net 250 ml          Exam:     Physical Exam:    Gen: Morbidly obese   HEENT:  Pink conjunctivae, PERRL, hearing intact to voice, moist mucous membranes  Neck:  Supple, without masses, thyroid non-tender  Resp: Frequent episodes of coughing. Splinting.  Crackles   Card:  No murmurs, normal S1, S2 without thrills, bruits or peripheral edema  Abd:  Soft, non-tender, non-distended, normoactive bowel sounds are present  Musc:  No cyanosis or clubbing  Skin:  No rashes or ulcers, skin turgor is good  Neuro:  Cranial nerves 3-12 are grossly intact,  strength is 5/5 bilaterally and dorsi / plantarflexion is 5/5 bilaterally, follows commands appropriately  Psych:  Good insight, oriented to person, place and time, alert    Medications Reviewed: (see below)    Lab Data Reviewed: (see below)    ______________________________________________________________________    Medications:     Current Facility-Administered Medications   Medication Dose Route Frequency    ipratropium-albuterol (COMBIVENT RESPIMAT) 20 mcg-100 mcg inhalation spray  1 Puff Inhalation Q6HWA RT    benzonatate (TESSALON) capsule 100 mg  100 mg Oral TID PRN    ketorolac (TORADOL) injection 30 mg  30 mg IntraVENous Q6H    HYDROcodone-chlorpheniramine (TUSSIONEX) oral suspension 5 mL  5 mL Oral Q12H PRN    HYDROcodone-acetaminophen (NORCO) 5-325 mg per tablet 1 Tab  1 Tab Oral Q6H PRN    ipratropium-albuterol (COMBIVENT RESPIMAT) 20 mcg-100 mcg inhalation spray  1 Puff Inhalation Q4H PRN    hydrOXYzine HCL (ATARAX) tablet 10 mg  10 mg Oral BID PRN    levothyroxine (SYNTHROID) tablet 88 mcg  88 mcg Oral ACB    rosuvastatin (CRESTOR) tablet 20 mg  20 mg Oral DAILY    sodium chloride (NS) flush 5-40 mL  5-40 mL IntraVENous Q8H    sodium chloride (NS) flush 5-40 mL  5-40 mL IntraVENous PRN    acetaminophen (TYLENOL) tablet 650 mg  650 mg Oral Q6H PRN    Or    acetaminophen (TYLENOL) suppository 650 mg  650 mg Rectal Q6H PRN    polyethylene glycol (MIRALAX) packet 17 g  17 g Oral DAILY PRN    promethazine (PHENERGAN) tablet 12.5 mg  12.5 mg Oral Q6H PRN    Or    ondansetron (ZOFRAN) injection 4 mg  4 mg IntraVENous Q6H PRN    enoxaparin (LOVENOX) injection 30 mg  30 mg SubCUTAneous Q12H    dexAMETHasone (DECADRON) tablet 6 mg  6 mg Oral DAILY    ascorbic acid (vitamin C) (VITAMIN C) tablet 500 mg  500 mg Oral DAILY    zinc gluconate tablet 50 mg  50 mg Oral DAILY            Lab Review:     Recent Labs     04/01/21  0535 03/31/21  1904   WBC 2.8* 3.7   HGB 13.2 12.5   HCT 42.2 39.3   * 141*     Recent Labs     04/01/21  0535 03/31/21  1904    135*   K 4.0 3.2*    103   CO2 23 24   * 87   BUN 12 11   CREA 0.90 1.00   CA 8.5 8.4*     No components found for: [place]         Assessment / Plan: Pneumonia due to COVID-19 virus (3/31/2021) - NOT hypoxic  -on decadron   -hold on antibiotics as procalcitonin low   -vitamin C/zinc  -combivent   -remdesivir if clinically worsens   -trend inflammatory markers   -OOB, incentive spirometry, ambulate       Hypokalemia (3/31/2021)  -repleted       Thrombocytopenia ([address] (3/31/2021) - likely 2/2 viral process   -monitor       Obesity (3/31/2021) - morbid   -negative prognostic indicator for COVID recovery      Asthma (3/31/2021)  -on Combivent for now due to COVID status       Hyperlipidemia (3/31/2021)  -statin       Acquired hypothyroidism (3/31/2021)  -on levothyroxine      Anxiety and depression (3/31/2021)  -atarax PRN       COVID-19 (4/1/2021)  -see above     Chest pain - likely MSK from coughing. Intercostal and reproducible.  Also with COVID PNA   -trend d-dimer, low threshold for CTA if uptrends or pt becomes hypoxic   -repeat troponin   -check EKG as does not appear pt has an admission EKG   -start toradol   -PRN cough meds    Total time spent with patient: 35 minutes             Care Plan discussed with: Patient     Discussed: Care plan     Prophylaxis:  Lovenox     Disposition: Home       ___________________________________________________    Attending Physician: Kandis Cheadle, MD

## 2021-04-02 ENCOUNTER — APPOINTMENT (OUTPATIENT)
Dept: CT IMAGING | Age: 31
DRG: 177 | End: 2021-04-02
Attending: INTERNAL MEDICINE
Payer: COMMERCIAL

## 2021-04-02 LAB
ALBUMIN SERPL-MCNC: 3.3 G/DL (ref 3.5–5)
ALBUMIN/GLOB SERPL: 0.9 {RATIO} (ref 1.1–2.2)
ALP SERPL-CCNC: 78 U/L (ref 45–117)
ALT SERPL-CCNC: 87 U/L (ref 12–78)
ANION GAP SERPL CALC-SCNC: 7 MMOL/L (ref 5–15)
AST SERPL-CCNC: 66 U/L (ref 15–37)
ATRIAL RATE: 108 BPM
ATRIAL RATE: 93 BPM
BASOPHILS # BLD: 0 K/UL (ref 0–0.1)
BASOPHILS NFR BLD: 0 % (ref 0–1)
BILIRUB DIRECT SERPL-MCNC: 0.4 MG/DL (ref 0–0.2)
BILIRUB SERPL-MCNC: 0.6 MG/DL (ref 0.2–1)
BNP SERPL-MCNC: 114 PG/ML
BUN SERPL-MCNC: 12 MG/DL (ref 6–20)
BUN/CREAT SERPL: 12 (ref 12–20)
CALCIUM SERPL-MCNC: 8.3 MG/DL (ref 8.5–10.1)
CALCULATED P AXIS, ECG09: 12 DEGREES
CALCULATED P AXIS, ECG09: 40 DEGREES
CALCULATED R AXIS, ECG10: 10 DEGREES
CALCULATED R AXIS, ECG10: 18 DEGREES
CALCULATED T AXIS, ECG11: 33 DEGREES
CALCULATED T AXIS, ECG11: 6 DEGREES
CHLORIDE SERPL-SCNC: 106 MMOL/L (ref 97–108)
CO2 SERPL-SCNC: 23 MMOL/L (ref 21–32)
CREAT SERPL-MCNC: 1.04 MG/DL (ref 0.55–1.02)
CRP SERPL-MCNC: 18.1 MG/DL (ref 0–0.6)
D DIMER PPP FEU-MCNC: 0.82 MG/L FEU (ref 0–0.65)
DIAGNOSIS, 93000: NORMAL
DIAGNOSIS, 93000: NORMAL
DIFFERENTIAL METHOD BLD: ABNORMAL
EOSINOPHIL # BLD: 0 K/UL (ref 0–0.4)
EOSINOPHIL NFR BLD: 0 % (ref 0–7)
ERYTHROCYTE [DISTWIDTH] IN BLOOD BY AUTOMATED COUNT: 14.1 % (ref 11.5–14.5)
GLOBULIN SER CALC-MCNC: 3.8 G/DL (ref 2–4)
GLUCOSE SERPL-MCNC: 86 MG/DL (ref 65–100)
HCT VFR BLD AUTO: 37.5 % (ref 35–47)
HGB BLD-MCNC: 11.8 G/DL (ref 11.5–16)
IMM GRANULOCYTES # BLD AUTO: 0 K/UL (ref 0–0.04)
IMM GRANULOCYTES NFR BLD AUTO: 0 % (ref 0–0.5)
LYMPHOCYTES # BLD: 0.7 K/UL (ref 0.8–3.5)
LYMPHOCYTES NFR BLD: 14 % (ref 12–49)
MCH RBC QN AUTO: 27 PG (ref 26–34)
MCHC RBC AUTO-ENTMCNC: 31.5 G/DL (ref 30–36.5)
MCV RBC AUTO: 85.8 FL (ref 80–99)
MONOCYTES # BLD: 0.1 K/UL (ref 0–1)
MONOCYTES NFR BLD: 2 % (ref 5–13)
NEUTS SEG # BLD: 3.9 K/UL (ref 1.8–8)
NEUTS SEG NFR BLD: 83 % (ref 32–75)
NRBC # BLD: 0 K/UL (ref 0–0.01)
NRBC BLD-RTO: 0 PER 100 WBC
P-R INTERVAL, ECG05: 186 MS
P-R INTERVAL, ECG05: 218 MS
PLATELET # BLD AUTO: 189 K/UL (ref 150–400)
PMV BLD AUTO: 10.5 FL (ref 8.9–12.9)
POTASSIUM SERPL-SCNC: 3.5 MMOL/L (ref 3.5–5.1)
PROCALCITONIN SERPL-MCNC: 0.2 NG/ML
PROT SERPL-MCNC: 7.1 G/DL (ref 6.4–8.2)
Q-T INTERVAL, ECG07: 306 MS
Q-T INTERVAL, ECG07: 358 MS
QRS DURATION, ECG06: 74 MS
QRS DURATION, ECG06: 76 MS
QTC CALCULATION (BEZET), ECG08: 410 MS
QTC CALCULATION (BEZET), ECG08: 445 MS
RBC # BLD AUTO: 4.37 M/UL (ref 3.8–5.2)
SODIUM SERPL-SCNC: 136 MMOL/L (ref 136–145)
VENTRICULAR RATE, ECG03: 108 BPM
VENTRICULAR RATE, ECG03: 93 BPM
WBC # BLD AUTO: 4.7 K/UL (ref 3.6–11)

## 2021-04-02 PROCEDURE — 96372 THER/PROPH/DIAG INJ SC/IM: CPT

## 2021-04-02 PROCEDURE — 96376 TX/PRO/DX INJ SAME DRUG ADON: CPT

## 2021-04-02 PROCEDURE — 74011250637 HC RX REV CODE- 250/637: Performed by: INTERNAL MEDICINE

## 2021-04-02 PROCEDURE — 80048 BASIC METABOLIC PNL TOTAL CA: CPT

## 2021-04-02 PROCEDURE — 99218 HC RM OBSERVATION: CPT

## 2021-04-02 PROCEDURE — 85379 FIBRIN DEGRADATION QUANT: CPT

## 2021-04-02 PROCEDURE — 86140 C-REACTIVE PROTEIN: CPT

## 2021-04-02 PROCEDURE — 96375 TX/PRO/DX INJ NEW DRUG ADDON: CPT

## 2021-04-02 PROCEDURE — 94760 N-INVAS EAR/PLS OXIMETRY 1: CPT

## 2021-04-02 PROCEDURE — 80076 HEPATIC FUNCTION PANEL: CPT

## 2021-04-02 PROCEDURE — 74011000258 HC RX REV CODE- 258: Performed by: INTERNAL MEDICINE

## 2021-04-02 PROCEDURE — 74011250636 HC RX REV CODE- 250/636: Performed by: INTERNAL MEDICINE

## 2021-04-02 PROCEDURE — 85025 COMPLETE CBC W/AUTO DIFF WBC: CPT

## 2021-04-02 PROCEDURE — 83880 ASSAY OF NATRIURETIC PEPTIDE: CPT

## 2021-04-02 PROCEDURE — 96361 HYDRATE IV INFUSION ADD-ON: CPT

## 2021-04-02 PROCEDURE — 36415 COLL VENOUS BLD VENIPUNCTURE: CPT

## 2021-04-02 PROCEDURE — 71275 CT ANGIOGRAPHY CHEST: CPT

## 2021-04-02 PROCEDURE — 77030027138 HC INCENT SPIROMETER -A

## 2021-04-02 PROCEDURE — 74011000250 HC RX REV CODE- 250: Performed by: INTERNAL MEDICINE

## 2021-04-02 PROCEDURE — 77010033711 HC HIGH FLOW OXYGEN

## 2021-04-02 PROCEDURE — 84145 PROCALCITONIN (PCT): CPT

## 2021-04-02 PROCEDURE — 74011000636 HC RX REV CODE- 636: Performed by: RADIOLOGY

## 2021-04-02 PROCEDURE — 94640 AIRWAY INHALATION TREATMENT: CPT

## 2021-04-02 PROCEDURE — 65270000029 HC RM PRIVATE

## 2021-04-02 PROCEDURE — XW033E5 INTRODUCTION OF REMDESIVIR ANTI-INFECTIVE INTO PERIPHERAL VEIN, PERCUTANEOUS APPROACH, NEW TECHNOLOGY GROUP 5: ICD-10-PCS | Performed by: INTERNAL MEDICINE

## 2021-04-02 PROCEDURE — 77010033678 HC OXYGEN DAILY

## 2021-04-02 RX ORDER — ALBUTEROL SULFATE 90 UG/1
2 AEROSOL, METERED RESPIRATORY (INHALATION)
Status: DISCONTINUED | OUTPATIENT
Start: 2021-04-02 | End: 2021-04-04

## 2021-04-02 RX ORDER — ENOXAPARIN SODIUM 100 MG/ML
60 INJECTION SUBCUTANEOUS EVERY 12 HOURS
Status: DISCONTINUED | OUTPATIENT
Start: 2021-04-02 | End: 2021-04-11 | Stop reason: HOSPADM

## 2021-04-02 RX ORDER — GUAIFENESIN 600 MG/1
1200 TABLET, EXTENDED RELEASE ORAL 2 TIMES DAILY
Status: DISCONTINUED | OUTPATIENT
Start: 2021-04-02 | End: 2021-04-11 | Stop reason: HOSPADM

## 2021-04-02 RX ORDER — HYDROCODONE POLISTIREX AND CHLORPHENIRAMINE POLISTIREX 10; 8 MG/5ML; MG/5ML
5 SUSPENSION, EXTENDED RELEASE ORAL
Status: DISCONTINUED | OUTPATIENT
Start: 2021-04-02 | End: 2021-04-11 | Stop reason: HOSPADM

## 2021-04-02 RX ORDER — BUDESONIDE AND FORMOTEROL FUMARATE DIHYDRATE 160; 4.5 UG/1; UG/1
2 AEROSOL RESPIRATORY (INHALATION)
Status: DISCONTINUED | OUTPATIENT
Start: 2021-04-02 | End: 2021-04-04

## 2021-04-02 RX ORDER — ENOXAPARIN SODIUM 100 MG/ML
40 INJECTION SUBCUTANEOUS EVERY 12 HOURS
Status: DISCONTINUED | OUTPATIENT
Start: 2021-04-02 | End: 2021-04-02

## 2021-04-02 RX ORDER — DEXAMETHASONE SODIUM PHOSPHATE 4 MG/ML
6 INJECTION, SOLUTION INTRA-ARTICULAR; INTRALESIONAL; INTRAMUSCULAR; INTRAVENOUS; SOFT TISSUE EVERY 24 HOURS
Status: DISCONTINUED | OUTPATIENT
Start: 2021-04-03 | End: 2021-04-02

## 2021-04-02 RX ADMIN — SODIUM CHLORIDE 75 ML/HR: 9 INJECTION, SOLUTION INTRAVENOUS at 18:11

## 2021-04-02 RX ADMIN — REMDESIVIR 200 MG: 100 INJECTION, POWDER, LYOPHILIZED, FOR SOLUTION INTRAVENOUS at 11:08

## 2021-04-02 RX ADMIN — METHYLPREDNISOLONE SODIUM SUCCINATE 40 MG: 40 INJECTION, POWDER, FOR SOLUTION INTRAMUSCULAR; INTRAVENOUS at 23:20

## 2021-04-02 RX ADMIN — KETOROLAC TROMETHAMINE 30 MG: 30 INJECTION, SOLUTION INTRAMUSCULAR at 11:07

## 2021-04-02 RX ADMIN — Medication 10 ML: at 06:27

## 2021-04-02 RX ADMIN — IPRATROPIUM BROMIDE AND ALBUTEROL 1 PUFF: 20; 100 SPRAY, METERED RESPIRATORY (INHALATION) at 13:27

## 2021-04-02 RX ADMIN — MORPHINE SULFATE 15 MG: 15 TABLET ORAL at 23:34

## 2021-04-02 RX ADMIN — GUAIFENESIN AND DEXTROMETHORPHAN 5 ML: 100; 10 SYRUP ORAL at 13:34

## 2021-04-02 RX ADMIN — BENZONATATE 100 MG: 100 CAPSULE ORAL at 02:14

## 2021-04-02 RX ADMIN — BENZONATATE 100 MG: 100 CAPSULE ORAL at 21:26

## 2021-04-02 RX ADMIN — KETOROLAC TROMETHAMINE 30 MG: 30 INJECTION, SOLUTION INTRAMUSCULAR at 18:03

## 2021-04-02 RX ADMIN — LEVOTHYROXINE SODIUM 88 MCG: 0.09 TABLET ORAL at 06:27

## 2021-04-02 RX ADMIN — METHYLPREDNISOLONE SODIUM SUCCINATE 40 MG: 40 INJECTION, POWDER, FOR SOLUTION INTRAMUSCULAR; INTRAVENOUS at 18:26

## 2021-04-02 RX ADMIN — KETOROLAC TROMETHAMINE 30 MG: 30 INJECTION, SOLUTION INTRAMUSCULAR at 23:20

## 2021-04-02 RX ADMIN — ENOXAPARIN SODIUM 30 MG: 30 INJECTION SUBCUTANEOUS at 11:08

## 2021-04-02 RX ADMIN — HYDROCODONE POLISTIREX AND CHLORPHENIRAMINE POLISTIREX 5 ML: 10; 8 SUSPENSION, EXTENDED RELEASE ORAL at 09:32

## 2021-04-02 RX ADMIN — HYDROCODONE POLISTIREX AND CHLORPHENIRAMINE POLISTIREX 5 ML: 10; 8 SUSPENSION, EXTENDED RELEASE ORAL at 21:26

## 2021-04-02 RX ADMIN — TOCILIZUMAB 800 MG: 20 INJECTION, SOLUTION, CONCENTRATE INTRAVENOUS at 19:25

## 2021-04-02 RX ADMIN — IPRATROPIUM BROMIDE AND ALBUTEROL 1 PUFF: 20; 100 SPRAY, METERED RESPIRATORY (INHALATION) at 21:26

## 2021-04-02 RX ADMIN — Medication 10 ML: at 21:26

## 2021-04-02 RX ADMIN — DEXAMETHASONE 6 MG: 6 TABLET ORAL at 09:33

## 2021-04-02 RX ADMIN — Medication 10 ML: at 18:05

## 2021-04-02 RX ADMIN — Medication 10 ML: at 13:34

## 2021-04-02 RX ADMIN — ENOXAPARIN SODIUM 60 MG: 60 INJECTION SUBCUTANEOUS at 21:25

## 2021-04-02 RX ADMIN — IPRATROPIUM BROMIDE AND ALBUTEROL 1 PUFF: 20; 100 SPRAY, METERED RESPIRATORY (INHALATION) at 07:57

## 2021-04-02 RX ADMIN — KETOROLAC TROMETHAMINE 30 MG: 30 INJECTION, SOLUTION INTRAMUSCULAR at 00:06

## 2021-04-02 RX ADMIN — ONDANSETRON 4 MG: 2 INJECTION INTRAMUSCULAR; INTRAVENOUS at 00:06

## 2021-04-02 RX ADMIN — ROSUVASTATIN CALCIUM 20 MG: 10 TABLET, COATED ORAL at 09:33

## 2021-04-02 RX ADMIN — ENOXAPARIN SODIUM 30 MG: 30 INJECTION SUBCUTANEOUS at 00:06

## 2021-04-02 RX ADMIN — OXYCODONE HYDROCHLORIDE AND ACETAMINOPHEN 500 MG: 500 TABLET ORAL at 09:33

## 2021-04-02 RX ADMIN — MORPHINE SULFATE 15 MG: 15 TABLET ORAL at 02:14

## 2021-04-02 RX ADMIN — Medication 50 MG: at 09:33

## 2021-04-02 RX ADMIN — BUDESONIDE AND FORMOTEROL FUMARATE DIHYDRATE 2 PUFF: 160; 4.5 AEROSOL RESPIRATORY (INHALATION) at 23:20

## 2021-04-02 RX ADMIN — GUAIFENESIN 1200 MG: 600 TABLET, EXTENDED RELEASE ORAL at 18:25

## 2021-04-02 RX ADMIN — KETOROLAC TROMETHAMINE 30 MG: 30 INJECTION, SOLUTION INTRAMUSCULAR at 06:27

## 2021-04-02 RX ADMIN — IOPAMIDOL 100 ML: 755 INJECTION, SOLUTION INTRAVENOUS at 09:48

## 2021-04-02 RX ADMIN — ACETAMINOPHEN 650 MG: 325 TABLET, FILM COATED ORAL at 06:46

## 2021-04-02 RX ADMIN — GUAIFENESIN AND DEXTROMETHORPHAN 5 ML: 100; 10 SYRUP ORAL at 00:10

## 2021-04-02 NOTE — PROGRESS NOTES
Bedside and Verbal shift change report given to ONELIA Clement (oncoming nurse) by Ely Motta RN and Jose Worthington RN (offgoing nurse). Report included the following information SBAR, Kardex, Intake/Output, MAR and Recent Results.

## 2021-04-02 NOTE — CONSULTS
Name: [facility]: [address]   : 1990 Admit Date: 3/31/2021   Phone: [phone]  Room: [facility]/01   PCP: Magda Acevedo MD  MRN: [id number]   Date: 2021  Code: Full Code        HPI:      Chart and notes reviewed. Data reviewed. I review the patient's current medications in the medical record at each encounter.  I have evaluated and examined the patient. 10:59 AM       History was obtained from patient. I was asked by Pawel De Leon MD to see Graham Gomezglo in consultation for a chief complaint of Covid-19, new hypoxia. History of Present Illness:   is a very pleasant, 26 yo lady with a PMH of asthma, hyperlipidemia, anxiety/depression, hypothyroidism, and obesity that presented to [facility] on 3/31 with worsening shortness of breath. Reports that her symptoms initially began on 3/22, but did not become severe until a few days ago. She is visiting from [place] and was taking care of her grandmother on hospice who passed away 1 week ago. She states that overnight, her symptoms became worse and her SOB became worse. She finds it difficult to take a deep breath. Denies fever or chills. No significant cough. No CP, LE pain, or swelling. She has a history of asthma and is followed by a pulmonologist in [place]. Typically her asthma is well controlled on Wixela 500/50. When she is well, she never uses rescue agents or her nebulizer. Rarely exacerbates. Nonsmoker. Yesterday, she was on RA and this morning desaturated; now on 7L. Images:  Personally reviewed. Chest CTA:   No PE. Diffuse bilateral infiltrates/conslidation. WBC 4.7  PCT . 20  D-dimer . 82  Pro-  CRP : 6.75      No past medical history on file. No past surgical history on file. No family history on file.     Social History     Tobacco Use    Smoking status: Not on file   Substance Use Topics    Alcohol use: Not on file       Allergies   Allergen Reactions  Codeine Nausea and Vomiting       Current Facility-Administered Medications   Medication Dose Route Frequency    HYDROcodone-chlorpheniramine (TUSSIONEX) oral suspension 5 mL  5 mL Oral Q12H PRN    remdesivir 200 mg in 0.9% sodium chloride 250 mL IVPB  200 mg IntraVENous ONCE    Followed by   Milan Sanchez ON 4/3/2021] remdesivir 100 mg in 0.9% sodium chloride 250 mL IVPB  100 mg IntraVENous Q24H    ipratropium-albuterol (COMBIVENT RESPIMAT) 20 mcg-100 mcg inhalation spray  1 Puff Inhalation Q6HWA RT    benzonatate (TESSALON) capsule 100 mg  100 mg Oral TID PRN    ketorolac (TORADOL) injection 30 mg  30 mg IntraVENous Q6H    guaiFENesin-dextromethorphan (ROBITUSSIN DM) 100-10 mg/5 mL syrup 5 mL  5 mL Oral Q6H PRN    morphine IR (MS IR) tablet 15 mg  15 mg Oral Q4H PRN    0.9% sodium chloride infusion  75 mL/hr IntraVENous CONTINUOUS    ipratropium-albuterol (COMBIVENT RESPIMAT) 20 mcg-100 mcg inhalation spray  1 Puff Inhalation Q4H PRN    hydrOXYzine HCL (ATARAX) tablet 10 mg  10 mg Oral BID PRN    levothyroxine (SYNTHROID) tablet 88 mcg  88 mcg Oral ACB    rosuvastatin (CRESTOR) tablet 20 mg  20 mg Oral DAILY    sodium chloride (NS) flush 5-40 mL  5-40 mL IntraVENous Q8H    sodium chloride (NS) flush 5-40 mL  5-40 mL IntraVENous PRN    acetaminophen (TYLENOL) tablet 650 mg  650 mg Oral Q6H PRN    Or    acetaminophen (TYLENOL) suppository 650 mg  650 mg Rectal Q6H PRN    polyethylene glycol (MIRALAX) packet 17 g  17 g Oral DAILY PRN    promethazine (PHENERGAN) tablet 12.5 mg  12.5 mg Oral Q6H PRN    Or    ondansetron (ZOFRAN) injection 4 mg  4 mg IntraVENous Q6H PRN    enoxaparin (LOVENOX) injection 30 mg  30 mg SubCUTAneous Q12H    dexAMETHasone (DECADRON) tablet 6 mg  6 mg Oral DAILY    ascorbic acid (vitamin C) (VITAMIN C) tablet 500 mg  500 mg Oral DAILY    zinc gluconate tablet 50 mg  50 mg Oral DAILY         REVIEW OF SYSTEMS   Negative except as stated in the HPI.       Physical Exam: Visit Vitals  /88 (BP 1 Location: Right arm, BP Patient Position: At rest)   Pulse (!) 111   Temp 98.4 °F (36.9 °C)   Resp 18   Ht 5' 3\" (1.6 m)   Wt 140.4 kg (309 lb 8.4 oz)   SpO2 92%   BMI 54.83 kg/m²       General:  Alert, ill appearing, cooperative, no distress, dyspneic with movement/talking. Head:  Normocephalic, without obvious abnormality, atraumatic. Eyes:  Conjunctivae/corneas clear. Nose: Nares normal. Septum midline. Mucosa normal.    Throat: Lips, mucosa, and tongue normal.    Neck: Supple, symmetrical, trachea midline, no adenopathy   Lungs:   Distant bs   Chest wall:  No tenderness or deformity. Heart:  Regular rate and rhythm, S1, S2 normal, no murmur, click, rub or gallop. Abdomen:   Soft, non-tender. Bowel sounds normal. Abdomen obese. Extremities: Extremities normal, atraumatic, no cyanosis or edema. Pulses: 2+ and symmetric all extremities.    Skin: Skin color, texture, turgor normal.   Lymph nodes: Cervical  normal.   Neurologic: Grossly nonfocal       Lab Results   Component Value Date/Time    Sodium 136 04/02/2021 06:30 AM    Potassium 3.5 04/02/2021 06:30 AM    Chloride 106 04/02/2021 06:30 AM    CO2 23 04/02/2021 06:30 AM    BUN 12 04/02/2021 06:30 AM    Creatinine 1.04 (H) 04/02/2021 06:30 AM    Glucose 86 04/02/2021 06:30 AM    Calcium 8.3 (L) 04/02/2021 06:30 AM       Lab Results   Component Value Date/Time    WBC 4.7 04/02/2021 06:30 AM    HGB 11.8 04/02/2021 06:30 AM    PLATELET 828 79/27/1995 06:30 AM    MCV 85.8 04/02/2021 06:30 AM       No results found for: INR, APTT, TBIL, AP, TP, ALB, GLOB, INREXT    Lab Results   Component Value Date/Time    Ferritin 370 04/01/2021 05:35 AM       Lab Results   Component Value Date/Time    C-Reactive protein 6.75 (H) 04/01/2021 05:35 AM    TSH 3.37 03/31/2021 10:43 PM        No results found for: PH, PHI, PCO2, PCO2I, PO2, PO2I, HCO3, HCO3I, FIO2, FIO2I    Lab Results   Component Value Date/Time    Troponin-I, Qt. <0.05 04/01/2021 08:45 PM        No results found for: CULT    No results found for: TOXA1, RPR, HBCM, HBSAG, HAAB, HCAB1, HAAT, G6PD, CRYAC, HIVGT, HIVR, HIV1, HIV12, HIVPC, HIVRPI    No results found for: VANCT, CPK    No results found for: COLOR, APPRN, SPGRU, EUSEBIO, PROTU, GLUCU, KETU, BILU, BLDU, UROU, SORAIDA, LEUKU, WBCU, RBCU, UEPI, BACTU, CASTS, UCRY    IMPRESSION  · Acute Respiratory Failure with Hypoxia  · Covid-19 PNA  · Obesity  · Asthma; not in acute exacerbation    PLAN  · Supplemental oxygen to keep sats >88%  · Continue Decadron, switch to IV  · Combivent scheduled  · Lovenox q12h  · Remdesivir. Watch LFTs, renal function. · Check CRP, if >7.5 then give a dose of Actemra  · Encourage IS use  · OOB  · Prone as able    Thank you very much for the consult.       Faye Zaldivar NP

## 2021-04-02 NOTE — PROGRESS NOTES
1330-TRANSFER - IN REPORT:    Verbal report received from Cathi, RN(name) on Aisha Santos  being received from 5th floor(unit) for change in patient condition(O2 demand )      Report consisted of patients Situation, Background, Assessment and   Recommendations(SBAR). Information from the following report(s) SBAR, Kardex, ED Summary, OR Summary, Procedure Summary, Intake/Output, MAR, Recent Results and Cardiac Rhythm NSR' was reviewed with the receiving nurse. Opportunity for questions and clarification was provided. Assessment completed upon patients arrival to unit and care assumed.

## 2021-04-02 NOTE — PROGRESS NOTES
0210- Pt called out stating she was short of breath, dizzy, and experiencing chest pain. O2 83% on RA. Pt placed on 3L NC and O2 increased to 95%. Pt instructed on utilizing IS and prone positioning frequently. Will continue to monitor.

## 2021-04-02 NOTE — PROGRESS NOTES
Bedside and Verbal shift change report given to Massiel Kwon (oncoming nurse) by Katerin Florentino RN (offgoing nurse). Report included the following information SBAR, Kardex, ED Summary, Intake/Output, Recent Results, Med Rec Status and Cardiac Rhythm NSR/ST. 2330: Pt alert, oriented. Spo2 @ 97% with midflow at 15 L per NC. Decreased Midflow non-heated to 9; Spo2 b/t 88-93%, tolerating well. 0415: Pt ambulated to [facility]; tolerated fairly well; After returning to bed; noted Spo2 b/t 82-86% with Midflow at 9 L per NC. Increased Midflow to 15 L per NC non-heated. Remained at pt bedside until SPO2 above 90. Spo2 b/t 89-92%, RR 22, HR 74, B/P 119/75, Temp 98.9, axillary. Bedside and Verbal shift change report given to Katerin Florentino RN (oncoming nurse) by Massiel Kwon (offgoing nurse). Report included the following information SBAR, Kardex, ED Summary, Intake/Output, Recent Results, Med Rec Status and Cardiac Rhythm NSR/SB.

## 2021-04-02 NOTE — PROGRESS NOTES
TRANSFER - OUT REPORT:    Verbal report given to Lisset(name) on Chuyita Catano  being transferred to [facility](unit) for change in patient condition(non breader o2)       Report consisted of patients Situation, Background, Assessment and   Recommendations(SBAR). Information from the following report(s) SBAR, Kardex, Intake/Output and MAR was reviewed with the receiving nurse. Lines:   Peripheral IV 03/31/21 Right Hand (Active)   Site Assessment Clean, dry, & intact 04/02/21 0930   Phlebitis Assessment 0 04/02/21 0930   Infiltration Assessment 0 04/02/21 0930   Dressing Status Intact 04/02/21 0930   Dressing Type Transparent 04/02/21 0930   Hub Color/Line Status Pink;Flushed;Capped 04/01/21 1945   Action Taken Open ports on tubing capped 04/01/21 1945   Alcohol Cap Used Yes 04/01/21 1945        Opportunity for questions and clarification was provided.       Patient transported with:   O2 @ Non breader 12l liters

## 2021-04-02 NOTE — PROGRESS NOTES
TRANSFER - IN REPORT:    Verbal report received from Indio Narvaez RN(name) on Kelby Tijerina  being received from 5th floor(unit) for change in patient condition(O2 DEMAND)      Report consisted of patients Situation, Background, Assessment and   Recommendations(SBAR). Information from the following report(s) SBAR, Kardex, ED Summary, MAR, Recent Results and Cardiac Rhythm NSR was reviewed with the receiving nurse. Opportunity for questions and clarification was provided. Assessment completed upon patients arrival to unit and care assumed. This patient was assisted with Intentional Toileting every 2 hours during this shift. Documentation of ambulation and output reflected on Flowsheet. Bedside shift change report given to Nory Menjivar RN (oncoming nurse) by Melany Freeman RN (offgoing nurse). Report included the following information SBAR, Kardex, ED Summary, MAR, Recent Results and Cardiac Rhythm NSR.

## 2021-04-02 NOTE — PROGRESS NOTES
Andr[address] [place] [address]  [address] [facility], [address] [place] [address]  [phone]      Medical Progress Note      NAME: Alina Chan   :  1990  MRM:  [id number]    Date/Time: 2021  3:57 PM         Subjective:     Chief Complaint:  \"I don't feel well\"     Pt seen and examined. Had a bad night overnight. Coughing frequently. Did have intercostal pain overnight. O2 requirements progressively worsening. Yesterday had passed eval for hypoxia in the evening    ROS:  (bold if positive, if negative)    Cough   Chest pain         Objective:       Vitals:          Last 24hrs VS reviewed since prior progress note. Most recent are:    Visit Vitals  /65 (BP 1 Location: Left upper arm, BP Patient Position: Sitting)   Pulse 100   Temp 97.6 °F (36.4 °C)   Resp 22   Ht 5' 3\" (1.6 m)   Wt 140.4 kg (309 lb 8.4 oz)   SpO2 91%   BMI 54.83 kg/m²     SpO2 Readings from Last 6 Encounters:   21 91%    O2 Flow Rate (L/min): 15 l/min       Intake/Output Summary (Last 24 hours) at 2021 1557  Last data filed at 2021 0930  Gross per 24 hour   Intake 340 ml   Output 500 ml   Net -160 ml          Exam:     Physical Exam:    Gen: Increased WOB. Ill appearing   HEENT:  Pink conjunctivae, PERRL, hearing intact to voice, moist mucous membranes  Neck:  Supple, without masses, thyroid non-tender  Resp: Diffusely coarse breath sounds.    Card:  No murmurs, normal S1, S2 without thrills, bruits or peripheral edema  Abd:  Soft, non-tender, non-distended, normoactive bowel sounds are present  Musc:  No cyanosis or clubbing  Skin:  No rashes or ulcers, skin turgor is good  Neuro:  Cranial nerves 3-12 are grossly intact,  strength is 5/5 bilaterally and dorsi / plantarflexion is 5/5 bilaterally, follows commands appropriately  Psych:  Good insight, oriented to person, place and time, alert  Morbidly obese     Medications Reviewed: (see below)    Lab Data Reviewed: (see below)    ______________________________________________________________________    Medications:     Current Facility-Administered Medications   Medication Dose Route Frequency    HYDROcodone-chlorpheniramine (TUSSIONEX) oral suspension 5 mL  5 mL Oral Q12H PRN    [START ON 4/3/2021] remdesivir 100 mg in 0.9% sodium chloride 250 mL IVPB  100 mg IntraVENous Q24H    [START ON 4/3/2021] dexamethasone (DECADRON) 4 mg/mL injection 6 mg  6 mg IntraVENous Q24H    enoxaparin (LOVENOX) injection 40 mg  40 mg SubCUTAneous Q12H    ipratropium-albuterol (COMBIVENT RESPIMAT) 20 mcg-100 mcg inhalation spray  1 Puff Inhalation Q6HWA RT    benzonatate (TESSALON) capsule 100 mg  100 mg Oral TID PRN    ketorolac (TORADOL) injection 30 mg  30 mg IntraVENous Q6H    guaiFENesin-dextromethorphan (ROBITUSSIN DM) 100-10 mg/5 mL syrup 5 mL  5 mL Oral Q6H PRN    morphine IR (MS IR) tablet 15 mg  15 mg Oral Q4H PRN    0.9% sodium chloride infusion  75 mL/hr IntraVENous CONTINUOUS    ipratropium-albuterol (COMBIVENT RESPIMAT) 20 mcg-100 mcg inhalation spray  1 Puff Inhalation Q4H PRN    hydrOXYzine HCL (ATARAX) tablet 10 mg  10 mg Oral BID PRN    levothyroxine (SYNTHROID) tablet 88 mcg  88 mcg Oral ACB    rosuvastatin (CRESTOR) tablet 20 mg  20 mg Oral DAILY    sodium chloride (NS) flush 5-40 mL  5-40 mL IntraVENous Q8H    sodium chloride (NS) flush 5-40 mL  5-40 mL IntraVENous PRN    acetaminophen (TYLENOL) tablet 650 mg  650 mg Oral Q6H PRN    Or    acetaminophen (TYLENOL) suppository 650 mg  650 mg Rectal Q6H PRN    polyethylene glycol (MIRALAX) packet 17 g  17 g Oral DAILY PRN    promethazine (PHENERGAN) tablet 12.5 mg  12.5 mg Oral Q6H PRN    Or    ondansetron (ZOFRAN) injection 4 mg  4 mg IntraVENous Q6H PRN    ascorbic acid (vitamin C) (VITAMIN C) tablet 500 mg  500 mg Oral DAILY    zinc gluconate tablet 50 mg  50 mg Oral DAILY            Lab Review:     Recent Labs     04/02/21  0630 04/01/21  0535 03/31/21  1906 WBC 4.7 2.8* 3.7   HGB 11.8 13.2 12.5   HCT 37.5 42.2 39.3    146* 141*     Recent Labs     04/02/21  0630 04/01/21  0535 03/31/21  1904    136 135*   K 3.5 4.0 3.2*    105 103   CO2 23 23 24   GLU 86 138* 87   BUN 12 12 11   CREA 1.04* 0.90 1.00   CA 8.3* 8.5 8.4*     No components found for: [place]         Assessment / Plan:   Acute respiratory failure with hypoxia  - 2/2 COVID   -see below      Pneumonia due to COVID-19 virus (3/31/2021) - overnight has significantly decompensated. Previously had passed the eval for hypoxia. Suspect pt's morbid obesity, splinting and lack of mobility has worsened the situation  -CTA without e/o PE   -continue steroids  -add Remdesivir   -procalcitonin low; no indication for antibiotics at this time   -pulm on board; CRP pending; likely will start Actemra  -transfer for midflow  -vitamin C/zinc   -incentive spirometry, OOB, prone if able   -trend inflammatory markers   -pulm on board       Hypokalemia (3/31/2021)  -replete       Thrombocytopenia ([address] (3/31/2021) - likely 2/2 viral process.  Resolved       Obesity (3/31/2021) - morbid   -likely a negative prognostic indicator       Asthma (3/31/2021)  -on Combivent, albuterol PRN   -Symbicort in place of Wixela       Hyperlipidemia (3/31/2021)  -on statin       Acquired hypothyroidism (3/31/2021)  -on levothyroxine       Anxiety and depression (3/31/2021)  -on Atarax PRN       COVID-19 (4/1/2021)  -droplet plus precautions   -see above     Total time spent with patient: 45 Minutes cc  Time                  Care Plan discussed with: Patient and Family (called to speak to mother and pt's family)     Discussed:  Care Plan    Prophylaxis:  Lovenox    Disposition:  Home w/Family           ___________________________________________________    Attending Physician: Gabe Abreu MD

## 2021-04-03 PROBLEM — J96.00 ACUTE RESPIRATORY FAILURE (HCC): Status: ACTIVE | Noted: 2021-04-03

## 2021-04-03 LAB
ABO + RH BLD: NORMAL
ALBUMIN SERPL-MCNC: 3 G/DL (ref 3.5–5)
ALBUMIN/GLOB SERPL: 0.7 {RATIO} (ref 1.1–2.2)
ALP SERPL-CCNC: 72 U/L (ref 45–117)
ALT SERPL-CCNC: 84 U/L (ref 12–78)
ANION GAP SERPL CALC-SCNC: 6 MMOL/L (ref 5–15)
AST SERPL-CCNC: 64 U/L (ref 15–37)
BASOPHILS # BLD: 0 K/UL (ref 0–0.1)
BASOPHILS NFR BLD: 0 % (ref 0–1)
BILIRUB DIRECT SERPL-MCNC: 0.2 MG/DL (ref 0–0.2)
BILIRUB SERPL-MCNC: 0.4 MG/DL (ref 0.2–1)
BLOOD GROUP ANTIBODIES SERPL: NORMAL
BUN SERPL-MCNC: 12 MG/DL (ref 6–20)
BUN/CREAT SERPL: 17 (ref 12–20)
CALCIUM SERPL-MCNC: 8.6 MG/DL (ref 8.5–10.1)
CHLORIDE SERPL-SCNC: 108 MMOL/L (ref 97–108)
CO2 SERPL-SCNC: 25 MMOL/L (ref 21–32)
CREAT SERPL-MCNC: 0.71 MG/DL (ref 0.55–1.02)
CRP SERPL-MCNC: 22.2 MG/DL (ref 0–0.6)
DIFFERENTIAL METHOD BLD: ABNORMAL
EOSINOPHIL # BLD: 0 K/UL (ref 0–0.4)
EOSINOPHIL NFR BLD: 0 % (ref 0–7)
ERYTHROCYTE [DISTWIDTH] IN BLOOD BY AUTOMATED COUNT: 14.3 % (ref 11.5–14.5)
EST. AVERAGE GLUCOSE BLD GHB EST-MCNC: 103 MG/DL
GLOBULIN SER CALC-MCNC: 4.6 G/DL (ref 2–4)
GLUCOSE SERPL-MCNC: 126 MG/DL (ref 65–100)
HBA1C MFR BLD: 5.2 % (ref 4–5.6)
HCT VFR BLD AUTO: 36.2 % (ref 35–47)
HGB BLD-MCNC: 11.1 G/DL (ref 11.5–16)
IMM GRANULOCYTES # BLD AUTO: 0 K/UL (ref 0–0.04)
IMM GRANULOCYTES NFR BLD AUTO: 1 % (ref 0–0.5)
LYMPHOCYTES # BLD: 0.4 K/UL (ref 0.8–3.5)
LYMPHOCYTES NFR BLD: 17 % (ref 12–49)
MCH RBC QN AUTO: 26.7 PG (ref 26–34)
MCHC RBC AUTO-ENTMCNC: 30.7 G/DL (ref 30–36.5)
MCV RBC AUTO: 87 FL (ref 80–99)
MONOCYTES # BLD: 0 K/UL (ref 0–1)
MONOCYTES NFR BLD: 1 % (ref 5–13)
NEUTS SEG # BLD: 2.1 K/UL (ref 1.8–8)
NEUTS SEG NFR BLD: 81 % (ref 32–75)
NRBC # BLD: 0 K/UL (ref 0–0.01)
NRBC BLD-RTO: 0 PER 100 WBC
PLATELET # BLD AUTO: 156 K/UL (ref 150–400)
PMV BLD AUTO: 11.7 FL (ref 8.9–12.9)
POTASSIUM SERPL-SCNC: 4.1 MMOL/L (ref 3.5–5.1)
PROT SERPL-MCNC: 7.6 G/DL (ref 6.4–8.2)
RBC # BLD AUTO: 4.16 M/UL (ref 3.8–5.2)
SODIUM SERPL-SCNC: 139 MMOL/L (ref 136–145)
SPECIMEN EXP DATE BLD: NORMAL
WBC # BLD AUTO: 2.6 K/UL (ref 3.6–11)

## 2021-04-03 PROCEDURE — 85025 COMPLETE CBC W/AUTO DIFF WBC: CPT

## 2021-04-03 PROCEDURE — 77030038269 HC DRN EXT URIN PURWCK BARD -A

## 2021-04-03 PROCEDURE — 80048 BASIC METABOLIC PNL TOTAL CA: CPT

## 2021-04-03 PROCEDURE — 74011250637 HC RX REV CODE- 250/637: Performed by: INTERNAL MEDICINE

## 2021-04-03 PROCEDURE — 86901 BLOOD TYPING SEROLOGIC RH(D): CPT

## 2021-04-03 PROCEDURE — 86140 C-REACTIVE PROTEIN: CPT

## 2021-04-03 PROCEDURE — 65660000000 HC RM CCU STEPDOWN

## 2021-04-03 PROCEDURE — 94640 AIRWAY INHALATION TREATMENT: CPT

## 2021-04-03 PROCEDURE — 74011000250 HC RX REV CODE- 250: Performed by: INTERNAL MEDICINE

## 2021-04-03 PROCEDURE — XW13325 TRANSFUSION OF CONVALESCENT PLASMA (NONAUTOLOGOUS) INTO PERIPHERAL VEIN, PERCUTANEOUS APPROACH, NEW TECHNOLOGY GROUP 5: ICD-10-PCS | Performed by: INTERNAL MEDICINE

## 2021-04-03 PROCEDURE — 74011000258 HC RX REV CODE- 258: Performed by: INTERNAL MEDICINE

## 2021-04-03 PROCEDURE — 74011250636 HC RX REV CODE- 250/636: Performed by: INTERNAL MEDICINE

## 2021-04-03 PROCEDURE — 86480 TB TEST CELL IMMUN MEASURE: CPT

## 2021-04-03 PROCEDURE — 99218 HC RM OBSERVATION: CPT

## 2021-04-03 PROCEDURE — 36415 COLL VENOUS BLD VENIPUNCTURE: CPT

## 2021-04-03 PROCEDURE — 2709999900 HC NON-CHARGEABLE SUPPLY

## 2021-04-03 PROCEDURE — 80076 HEPATIC FUNCTION PANEL: CPT

## 2021-04-03 PROCEDURE — 96376 TX/PRO/DX INJ SAME DRUG ADON: CPT

## 2021-04-03 PROCEDURE — 96372 THER/PROPH/DIAG INJ SC/IM: CPT

## 2021-04-03 PROCEDURE — 77030027138 HC INCENT SPIROMETER -A

## 2021-04-03 PROCEDURE — 83036 HEMOGLOBIN GLYCOSYLATED A1C: CPT

## 2021-04-03 PROCEDURE — 36430 TRANSFUSION BLD/BLD COMPNT: CPT

## 2021-04-03 RX ORDER — MORPHINE SULFATE 2 MG/ML
2 INJECTION, SOLUTION INTRAMUSCULAR; INTRAVENOUS
Status: DISCONTINUED | OUTPATIENT
Start: 2021-04-03 | End: 2021-04-11 | Stop reason: HOSPADM

## 2021-04-03 RX ORDER — HYDROXYZINE HYDROCHLORIDE 10 MG/1
10 TABLET, FILM COATED ORAL
Status: DISCONTINUED | OUTPATIENT
Start: 2021-04-03 | End: 2021-04-11 | Stop reason: HOSPADM

## 2021-04-03 RX ORDER — LORAZEPAM 0.5 MG/1
0.5 TABLET ORAL
Status: DISCONTINUED | OUTPATIENT
Start: 2021-04-03 | End: 2021-04-04

## 2021-04-03 RX ORDER — SODIUM CHLORIDE 9 MG/ML
250 INJECTION, SOLUTION INTRAVENOUS AS NEEDED
Status: DISCONTINUED | OUTPATIENT
Start: 2021-04-03 | End: 2021-04-11 | Stop reason: HOSPADM

## 2021-04-03 RX ORDER — ZOLPIDEM TARTRATE 5 MG/1
5 TABLET ORAL
Status: DISCONTINUED | OUTPATIENT
Start: 2021-04-03 | End: 2021-04-11 | Stop reason: HOSPADM

## 2021-04-03 RX ADMIN — GUAIFENESIN 1200 MG: 600 TABLET, EXTENDED RELEASE ORAL at 09:09

## 2021-04-03 RX ADMIN — BUDESONIDE AND FORMOTEROL FUMARATE DIHYDRATE 2 PUFF: 160; 4.5 AEROSOL RESPIRATORY (INHALATION) at 20:22

## 2021-04-03 RX ADMIN — KETOROLAC TROMETHAMINE 30 MG: 30 INJECTION, SOLUTION INTRAMUSCULAR at 06:28

## 2021-04-03 RX ADMIN — ENOXAPARIN SODIUM 60 MG: 60 INJECTION SUBCUTANEOUS at 20:45

## 2021-04-03 RX ADMIN — METHYLPREDNISOLONE SODIUM SUCCINATE 40 MG: 40 INJECTION, POWDER, FOR SOLUTION INTRAMUSCULAR; INTRAVENOUS at 11:41

## 2021-04-03 RX ADMIN — HYDROXYZINE HYDROCHLORIDE 10 MG: 10 TABLET, FILM COATED ORAL at 09:28

## 2021-04-03 RX ADMIN — ENOXAPARIN SODIUM 60 MG: 60 INJECTION SUBCUTANEOUS at 09:24

## 2021-04-03 RX ADMIN — MORPHINE SULFATE 15 MG: 15 TABLET ORAL at 03:51

## 2021-04-03 RX ADMIN — METHYLPREDNISOLONE SODIUM SUCCINATE 40 MG: 40 INJECTION, POWDER, FOR SOLUTION INTRAMUSCULAR; INTRAVENOUS at 17:13

## 2021-04-03 RX ADMIN — ROSUVASTATIN CALCIUM 20 MG: 10 TABLET, COATED ORAL at 09:09

## 2021-04-03 RX ADMIN — GUAIFENESIN 1200 MG: 600 TABLET, EXTENDED RELEASE ORAL at 17:13

## 2021-04-03 RX ADMIN — HYDROCODONE POLISTIREX AND CHLORPHENIRAMINE POLISTIREX 5 ML: 10; 8 SUSPENSION, EXTENDED RELEASE ORAL at 20:45

## 2021-04-03 RX ADMIN — IPRATROPIUM BROMIDE AND ALBUTEROL 1 PUFF: 20; 100 SPRAY, METERED RESPIRATORY (INHALATION) at 15:00

## 2021-04-03 RX ADMIN — OXYCODONE HYDROCHLORIDE AND ACETAMINOPHEN 500 MG: 500 TABLET ORAL at 09:09

## 2021-04-03 RX ADMIN — Medication 10 ML: at 15:22

## 2021-04-03 RX ADMIN — REMDESIVIR 100 MG: 100 INJECTION, POWDER, LYOPHILIZED, FOR SOLUTION INTRAVENOUS at 06:29

## 2021-04-03 RX ADMIN — IPRATROPIUM BROMIDE AND ALBUTEROL 1 PUFF: 20; 100 SPRAY, METERED RESPIRATORY (INHALATION) at 20:20

## 2021-04-03 RX ADMIN — MORPHINE SULFATE 15 MG: 15 TABLET ORAL at 17:13

## 2021-04-03 RX ADMIN — LEVOTHYROXINE SODIUM 88 MCG: 0.09 TABLET ORAL at 06:29

## 2021-04-03 RX ADMIN — Medication 10 ML: at 06:29

## 2021-04-03 RX ADMIN — METHYLPREDNISOLONE SODIUM SUCCINATE 40 MG: 40 INJECTION, POWDER, FOR SOLUTION INTRAMUSCULAR; INTRAVENOUS at 06:28

## 2021-04-03 RX ADMIN — Medication 50 MG: at 09:09

## 2021-04-03 RX ADMIN — BUDESONIDE AND FORMOTEROL FUMARATE DIHYDRATE 2 PUFF: 160; 4.5 AEROSOL RESPIRATORY (INHALATION) at 08:22

## 2021-04-03 RX ADMIN — LORAZEPAM 0.5 MG: 0.5 TABLET ORAL at 15:24

## 2021-04-03 RX ADMIN — MORPHINE SULFATE 15 MG: 15 TABLET ORAL at 12:10

## 2021-04-03 RX ADMIN — IPRATROPIUM BROMIDE AND ALBUTEROL 1 PUFF: 20; 100 SPRAY, METERED RESPIRATORY (INHALATION) at 08:22

## 2021-04-03 NOTE — PROGRESS NOTES
Bedside shift change report given to Daisy Venegas RN (oncoming nurse) by Nory Menjivar RN (offgoing nurse). Report included the following information SBAR, Kardex, ED Summary, MAR, Recent Results and Cardiac Rhythm NSR. TRANSFER - OUT REPORT:    Verbal report given to ONELIA Wilson(name) on Kelby Tijerina  being transferred to ICU(unit) for change in patient condition(O2 Demand)       Report consisted of patients Situation, Background, Assessment and   Recommendations(SBAR). Information from the following report(s) SBAR, Kardex, ED Summary, MAR, Recent Results and Cardiac Rhythm nsr was reviewed with the receiving nurse. Lines:   Peripheral IV 03/31/21 Right Hand (Active)   Site Assessment Clean, dry, & intact 04/03/21 0824   Phlebitis Assessment 0 04/03/21 0824   Infiltration Assessment 0 04/03/21 0824   Dressing Status Clean, dry, & intact 04/03/21 0824   Dressing Type Transparent;Tape 04/03/21 0824   Hub Color/Line Status Pink;Capped 04/03/21 0824   Action Taken Open ports on tubing capped 04/03/21 0824   Alcohol Cap Used Yes 04/03/21 0824       Peripheral IV 04/02/21 Anterior;Proximal;Right Forearm (Active)   Site Assessment Clean, dry, & intact 04/03/21 0824   Phlebitis Assessment 0 04/03/21 0824   Infiltration Assessment 0 04/03/21 0824   Dressing Status Clean, dry, & intact 04/03/21 0824   Dressing Type Transparent;Tape 04/03/21 0824   Hub Color/Line Status Pink;Flushed;Capped 04/03/21 5961   Action Taken Open ports on tubing capped 04/03/21 0824   Alcohol Cap Used Yes 04/03/21 0824        Opportunity for questions and clarification was provided.       Patient transported with:   O2 @ 25 liters  Registered Nurse

## 2021-04-03 NOTE — PROGRESS NOTES
0945-TRANSFER - IN REPORT:Verbal report received from [address] [place] [address] Se RN(name) on [employer] received from [facility](unit) for change in patient condition(increased oxygen requirement)  Report consisted of patients Situation, Background, Assessment and Recommendations(SBAR). Information from the following report(s) SBAR, Kardex, Procedure Summary, Intake/Output, MAR, Recent Results and Cardiac Rhythm NSR to ST was reviewed with the receiving nurse. Opportunity for questions and clarification was provided. Assessment completed upon patients arrival to unit and care assumed. John ROUSSEAU  1187- Pt medicated at this time with Morphine IR for complaints of chest discomfort, worse with coughing. Will monitor for effect. John ROUSSEAU  1400-Pt napping; has been resting well since shortly after receiving Morphine. John ROUSSEAU  8910-Bedside and Verbal shift change report given to D. Hampton Lesches (oncoming nurse) by STARR Odell RN (offgoing nurse). Report included the following information SBAR, Kardex, Procedure Summary, Intake/Output, MAR, Recent Results and Cardiac Rhythm SB/SR and ST with exertion. The patient sat up for dinner in the chair, ambulated to the toilet for all voids, and used her incentive spirometer independently today. Received convalescent plasma transfusion. Administered Morphine IR X 2 for chest pain, worse with coughing with excellent effect.  John ROUSSEAU

## 2021-04-03 NOTE — PROGRESS NOTES
Andrés Orellana [facility]  [address] [facility], [place], [address] [facility]  [phone]      Medical Progress Note      NAME: Kaye Rebolledo   :  1990  MRM:  [id number]    Date/Time: 4/3/2021  3:57 PM         Subjective:     Chief Complaint:  \"I was really anxious\"     Pt seen and examined. Transferred to ICU for high flow as stepdown boarder. Feels well on high flow. Gets winded very easily with exertion and HR uptrends. Consented to convalescent plasma this AM    ROS:  (bold if positive, if negative)    Cough   Chest pain         Objective:       Vitals:          Last 24hrs VS reviewed since prior progress note. Most recent are:    Visit Vitals  /69   Pulse 78   Temp 97.9 °F (36.6 °C)   Resp 22   Ht 5' 3\" (1.6 m)   Wt 141.1 kg (311 lb 1.1 oz)   SpO2 (!) 88%   BMI 55.10 kg/m²     SpO2 Readings from Last 6 Encounters:   21 (!) 88%    O2 Flow Rate (L/min): 50 l/min       Intake/Output Summary (Last 24 hours) at 4/3/2021 1243  Last data filed at 4/3/2021 1130  Gross per 24 hour   Intake 1389.5 ml   Output 700 ml   Net 689.5 ml          Exam:     Physical Exam:    Gen: Increased WOB. Ill appearing. On high flow  HEENT:  Pink conjunctivae, PERRL, hearing intact to voice, moist mucous membranes  Neck:  Supple, without masses, thyroid non-tender  Resp: Diffusely coarse breath sounds. Card: tachycardic.   No murmurs, normal S1, S2 without thrills, bruits or peripheral edema  Abd:  Soft, non-tender, non-distended, normoactive bowel sounds are present  Musc:  No cyanosis or clubbing  Skin:  No rashes or ulcers, skin turgor is good  Neuro:  Cranial nerves 3-12 are grossly intact,  strength is 5/5 bilaterally and dorsi / plantarflexion is 5/5 bilaterally, follows commands appropriately  Psych:  Good insight, oriented to person, place and time, alert  Morbidly obese   Anxious     Medications Reviewed: (see below)    Lab Data Reviewed: (see below)    ______________________________________________________________________    Medications:     Current Facility-Administered Medications   Medication Dose Route Frequency    0.9% sodium chloride infusion 250 mL  250 mL IntraVENous PRN    morphine injection 2 mg  2 mg IntraVENous Q4H PRN    zolpidem (AMBIEN) tablet 5 mg  5 mg Oral QHS PRN    0.9% sodium chloride infusion 250 mL  250 mL IntraVENous PRN    LORazepam (ATIVAN) tablet 0.5 mg  0.5 mg Oral Q6H PRN    hydrOXYzine HCL (ATARAX) tablet 10 mg  10 mg Oral TID PRN    HYDROcodone-chlorpheniramine (TUSSIONEX) oral suspension 5 mL  5 mL Oral Q12H PRN    remdesivir 100 mg in 0.9% sodium chloride 250 mL IVPB  100 mg IntraVENous Q24H    albuterol (PROVENTIL HFA, VENTOLIN HFA, PROAIR HFA) inhaler 2 Puff  2 Puff Inhalation Q4H PRN    budesonide-formoteroL (SYMBICORT) 160-4.5 mcg/actuation HFA inhaler 2 Puff  2 Puff Inhalation BID RT    guaiFENesin ER (MUCINEX) tablet 1,200 mg  1,200 mg Oral BID    methylPREDNISolone (PF) (SOLU-MEDROL) injection 40 mg  40 mg IntraVENous Q6H    enoxaparin (LOVENOX) injection 60 mg  60 mg SubCUTAneous Q12H    ipratropium-albuterol (COMBIVENT RESPIMAT) 20 mcg-100 mcg inhalation spray  1 Puff Inhalation Q6HWA RT    benzonatate (TESSALON) capsule 100 mg  100 mg Oral TID PRN    guaiFENesin-dextromethorphan (ROBITUSSIN DM) 100-10 mg/5 mL syrup 5 mL  5 mL Oral Q6H PRN    morphine IR (MS IR) tablet 15 mg  15 mg Oral Q4H PRN    ipratropium-albuterol (COMBIVENT RESPIMAT) 20 mcg-100 mcg inhalation spray  1 Puff Inhalation Q4H PRN    levothyroxine (SYNTHROID) tablet 88 mcg  88 mcg Oral ACB    rosuvastatin (CRESTOR) tablet 20 mg  20 mg Oral DAILY    sodium chloride (NS) flush 5-40 mL  5-40 mL IntraVENous Q8H    sodium chloride (NS) flush 5-40 mL  5-40 mL IntraVENous PRN    acetaminophen (TYLENOL) tablet 650 mg  650 mg Oral Q6H PRN    Or    acetaminophen (TYLENOL) suppository 650 mg  650 mg Rectal Q6H PRN    polyethylene glycol (MIRALAX) packet 17 g  17 g Oral DAILY PRN    promethazine (PHENERGAN) tablet 12.5 mg  12.5 mg Oral Q6H PRN    Or    ondansetron (ZOFRAN) injection 4 mg  4 mg IntraVENous Q6H PRN    ascorbic acid (vitamin C) (VITAMIN C) tablet 500 mg  500 mg Oral DAILY    zinc gluconate tablet 50 mg  50 mg Oral DAILY            Lab Review:     Recent Labs     04/03/21 0327 04/02/21  0630 04/01/21  0535   WBC 2.6* 4.7 2.8*   HGB 11.1* 11.8 13.2   HCT 36.2 37.5 42.2    189 146*     Recent Labs     04/03/21  0327 04/02/21  1714 04/02/21  0630 04/01/21  0535     --  136 136   K 4.1  --  3.5 4.0     --  106 105   CO2 25  --  23 23   *  --  86 138*   BUN 12  --  12 12   CREA 0.71  --  1.04* 0.90   CA 8.6  --  8.3* 8.5   ALB 3.0* 3.3*  --   --    ALT 84* 87*  --   --      No components found for: [place]         Assessment / Plan:   Acute respiratory failure with hypoxia  - 2/2 COVID   -see below; now on high flow     Pneumonia due to COVID-19 virus (3/31/2021) -  has significantly decompensated. Previously had passed the eval for hypoxia. Suspect pt's morbid obesity, splinting and lack of mobility has worsened the situation  -CTA without e/o PE   -continue steroids; on IV   -Remdesivir started 4/3  -procalcitonin low; no indication for antibiotics at this time   -CRP elevated; Actemra given on 4/3  -vitamin C/zinc   -incentive spirometry, OOB, prone if able   -trend inflammatory markers   -pulm on board   -convalescent plasma today       Hypokalemia (3/31/2021) - repleted. Now WNL   -monitor       Thrombocytopenia ([facility] [address] (3/31/2021) - likely 2/2 viral process.  Resolved       Obesity (3/31/2021) - morbid   -likely a negative prognostic indicator       Asthma (3/31/2021)  -on Combivent, albuterol PRN   -Symbicort in place of Wixela       Hyperlipidemia (3/31/2021)  -on statin       Acquired hypothyroidism (3/31/2021)  -on levothyroxine       Anxiety and depression (3/31/2021)  -on Atarax PRN; increase to TILD ORTIZN       COVID-19 (4/1/2021)  -droplet plus precautions   -see above     Total time spent with patient: 35 Minutes cc  Time                  Care Plan discussed with: Patient, RN     Discussed:  Care Plan    Prophylaxis:  Lovenox    Disposition:  Home w/Family           ___________________________________________________    Attending Physician: Reagan Caceres MD

## 2021-04-03 NOTE — PROGRESS NOTES
Name: [facility] [place]: [facility]   : 1990 Admit Date: 3/31/2021   Phone: [phone]  Room: [facility]/01   PCP: Marlene Hennessy MD  MRN: [id number]   Date: 4/3/2021  Code: Full Code        HPI:      Chart and notes reviewed. Data reviewed. I review the patient's current medications in the medical record at each encounter.  I have evaluated and examined the patient. 10:59 AM       History was obtained from patient. I was asked by Angus Vela MD to see Anjelica Songs in consultation for a chief complaint of Covid-19, new hypoxia. History of Present Illness:   is a very pleasant, 28 yo lady with a PMH of asthma, hyperlipidemia, anxiety/depression, hypothyroidism, and obesity that presented to [facility] on 3/31 with worsening shortness of breath. Reports that her symptoms initially began on 3/22, but did not become severe until a few days ago. She is visiting from [place] and was taking care of her grandmother on hospice who passed away 1 week ago. She states that overnight, her symptoms became worse and her SOB became worse. She finds it difficult to take a deep breath. Denies fever or chills. No significant cough. No CP, LE pain, or swelling. She has a history of asthma and is followed by a pulmonologist in [place]. Typically her asthma is well controlled on Wixela 500/50. When she is well, she never uses rescue agents or her nebulizer. Rarely exacerbates. Nonsmoker. Yesterday, she was on RA and this morning desaturated; now on 7L. Images:  Personally reviewed. Chest CTA:   No PE. Diffuse bilateral infiltrates/conslidation. WBC 4.7  PCT . 20  D-dimer . 82  Pro-  CRP : 6.75    Interval History:    Afebrile overnight  BP stable  Sats 89-90% on 15L midflow  WBC 2.6  CRP 22.2   S/p Actemra x1 on     ROS:  Very fatigued. SOB with ambulation. Cough worse whenever exerting herself. No past medical history on file.     No past surgical history on file. No family history on file.     Social History     Tobacco Use    Smoking status: Not on file   Substance Use Topics    Alcohol use: Not on file       Allergies   Allergen Reactions    Codeine Nausea and Vomiting       Current Facility-Administered Medications   Medication Dose Route Frequency    0.9% sodium chloride infusion 250 mL  250 mL IntraVENous PRN    morphine injection 2 mg  2 mg IntraVENous Q4H PRN    zolpidem (AMBIEN) tablet 5 mg  5 mg Oral QHS PRN    0.9% sodium chloride infusion 250 mL  250 mL IntraVENous PRN    HYDROcodone-chlorpheniramine (TUSSIONEX) oral suspension 5 mL  5 mL Oral Q12H PRN    remdesivir 100 mg in 0.9% sodium chloride 250 mL IVPB  100 mg IntraVENous Q24H    albuterol (PROVENTIL HFA, VENTOLIN HFA, PROAIR HFA) inhaler 2 Puff  2 Puff Inhalation Q4H PRN    budesonide-formoteroL (SYMBICORT) 160-4.5 mcg/actuation HFA inhaler 2 Puff  2 Puff Inhalation BID RT    guaiFENesin ER (MUCINEX) tablet 1,200 mg  1,200 mg Oral BID    methylPREDNISolone (PF) (SOLU-MEDROL) injection 40 mg  40 mg IntraVENous Q6H    enoxaparin (LOVENOX) injection 60 mg  60 mg SubCUTAneous Q12H    ipratropium-albuterol (COMBIVENT RESPIMAT) 20 mcg-100 mcg inhalation spray  1 Puff Inhalation Q6HWA RT    benzonatate (TESSALON) capsule 100 mg  100 mg Oral TID PRN    guaiFENesin-dextromethorphan (ROBITUSSIN DM) 100-10 mg/5 mL syrup 5 mL  5 mL Oral Q6H PRN    morphine IR (MS IR) tablet 15 mg  15 mg Oral Q4H PRN    ipratropium-albuterol (COMBIVENT RESPIMAT) 20 mcg-100 mcg inhalation spray  1 Puff Inhalation Q4H PRN    hydrOXYzine HCL (ATARAX) tablet 10 mg  10 mg Oral BID PRN    levothyroxine (SYNTHROID) tablet 88 mcg  88 mcg Oral ACB    rosuvastatin (CRESTOR) tablet 20 mg  20 mg Oral DAILY    sodium chloride (NS) flush 5-40 mL  5-40 mL IntraVENous Q8H    sodium chloride (NS) flush 5-40 mL  5-40 mL IntraVENous PRN    acetaminophen (TYLENOL) tablet 650 mg  650 mg Oral Q6H PRN    Or  acetaminophen (TYLENOL) suppository 650 mg  650 mg Rectal Q6H PRN    polyethylene glycol (MIRALAX) packet 17 g  17 g Oral DAILY PRN    promethazine (PHENERGAN) tablet 12.5 mg  12.5 mg Oral Q6H PRN    Or    ondansetron (ZOFRAN) injection 4 mg  4 mg IntraVENous Q6H PRN    ascorbic acid (vitamin C) (VITAMIN C) tablet 500 mg  500 mg Oral DAILY    zinc gluconate tablet 50 mg  50 mg Oral DAILY         REVIEW OF SYSTEMS   Negative except as stated in the HPI. Physical Exam:   Visit Vitals  /75 (BP 1 Location: Left upper arm, BP Patient Position: At rest;Sitting)   Pulse 73   Temp 98.9 °F (37.2 °C)   Resp 22   Ht 5' 3\" (1.6 m)   Wt 141.1 kg (311 lb 1.1 oz)   SpO2 (!) 88%   BMI 55.10 kg/m²       General:  Alert, ill appearing, cooperative, no distress, dyspneic with movement/talking, on 15L midflow   Head:  Normocephalic, without obvious abnormality, atraumatic. Eyes:  Conjunctivae/corneas clear. Nose: Nares normal. Septum midline. Mucosa normal.    Throat: Lips, mucosa, and tongue normal.    Neck: Supple, symmetrical, trachea midline, no adenopathy   Lungs:   Distant bs   Chest wall:  No tenderness or deformity. Heart:  Regular rate and rhythm, S1, S2 normal, no murmur, click, rub or gallop. Abdomen:   Soft, non-tender. Bowel sounds normal. Abdomen obese. Extremities: Extremities normal, atraumatic, no cyanosis or edema. Pulses: 2+ and symmetric all extremities.    Skin: Skin color, texture, turgor normal.   Lymph nodes: Cervical  normal.   Neurologic: Grossly nonfocal       Lab Results   Component Value Date/Time    Sodium 139 04/03/2021 03:27 AM    Potassium 4.1 04/03/2021 03:27 AM    Chloride 108 04/03/2021 03:27 AM    CO2 25 04/03/2021 03:27 AM    BUN 12 04/03/2021 03:27 AM    Creatinine 0.71 04/03/2021 03:27 AM    Glucose 126 (H) 04/03/2021 03:27 AM    Calcium 8.6 04/03/2021 03:27 AM       Lab Results   Component Value Date/Time    WBC 2.6 (L) 04/03/2021 03:27 AM    HGB 11.1 (L) 04/03/2021 03:27 AM    PLATELET 245 30/70/0108 03:27 AM    MCV 87.0 04/03/2021 03:27 AM       Lab Results   Component Value Date/Time    Alk. phosphatase 72 04/03/2021 03:27 AM    Protein, total 7.6 04/03/2021 03:27 AM    Albumin 3.0 (L) 04/03/2021 03:27 AM    Globulin 4.6 (H) 04/03/2021 03:27 AM       Lab Results   Component Value Date/Time    Ferritin 370 04/01/2021 05:35 AM       Lab Results   Component Value Date/Time    C-Reactive protein 22.20 (H) 04/03/2021 03:05 AM    TSH 3.37 03/31/2021 10:43 PM        No results found for: PH, PHI, PCO2, PCO2I, PO2, PO2I, HCO3, HCO3I, FIO2, FIO2I    Lab Results   Component Value Date/Time    Troponin-I, Qt. <0.05 04/01/2021 08:45 PM        No results found for: CULT    No results found for: TOXA1, RPR, HBCM, HBSAG, HAAB, HCAB1, HAAT, G6PD, CRYAC, HIVGT, HIVR, HIV1, HIV12, HIVPC, HIVRPI    No results found for: VANCT, CPK    No results found for: COLOR, APPRN, SPGRU, EUSEBIO, PROTU, GLUCU, KETU, BILU, BLDU, UROU, SOARIDA, LEUKU, WBCU, RBCU, UEPI, BACTU, CASTS, UCRY    IMPRESSION  · Acute Respiratory Failure with Hypoxia  · Covid-19 PNA  · Obesity  · Asthma; not in acute exacerbation    PLAN  · Supplemental oxygen to keep sats >88%; barely maintaining sats on midflow currently. Will order high flow if needed. · Continue Solu-Medrol  · Combivent scheduled  · Lovenox 60mg q12h  · Remdesivir. Watch LFTs, renal function. · S/p Actemra  · F/U Quant gold and hepatitis panel  · Give one dose of convalescent plasma today   · Encourage IS use  · OOB  · Prone as able    Patient at high risk for decompensation.         Karen Cherry, NP

## 2021-04-04 ENCOUNTER — APPOINTMENT (OUTPATIENT)
Dept: VASCULAR SURGERY | Age: 31
DRG: 177 | End: 2021-04-04
Attending: NURSE PRACTITIONER
Payer: COMMERCIAL

## 2021-04-04 ENCOUNTER — APPOINTMENT (OUTPATIENT)
Dept: GENERAL RADIOLOGY | Age: 31
DRG: 177 | End: 2021-04-04
Attending: INTERNAL MEDICINE
Payer: COMMERCIAL

## 2021-04-04 ENCOUNTER — APPOINTMENT (OUTPATIENT)
Dept: NON INVASIVE DIAGNOSTICS | Age: 31
DRG: 177 | End: 2021-04-04
Attending: INTERNAL MEDICINE
Payer: COMMERCIAL

## 2021-04-04 LAB
ALBUMIN SERPL-MCNC: 2.8 G/DL (ref 3.5–5)
ALBUMIN SERPL-MCNC: 2.9 G/DL (ref 3.5–5)
ALBUMIN/GLOB SERPL: 0.7 {RATIO} (ref 1.1–2.2)
ALBUMIN/GLOB SERPL: 0.7 {RATIO} (ref 1.1–2.2)
ALP SERPL-CCNC: 72 U/L (ref 45–117)
ALP SERPL-CCNC: 72 U/L (ref 45–117)
ALT SERPL-CCNC: 70 U/L (ref 12–78)
ALT SERPL-CCNC: 71 U/L (ref 12–78)
ANION GAP SERPL CALC-SCNC: 6 MMOL/L (ref 5–15)
ARTERIAL PATENCY WRIST A: YES
ARTERIAL PATENCY WRIST A: YES
AST SERPL-CCNC: 50 U/L (ref 15–37)
AST SERPL-CCNC: 52 U/L (ref 15–37)
BASE EXCESS BLDA CALC-SCNC: 0.2 MMOL/L
BASE EXCESS BLDA CALC-SCNC: 1.1 MMOL/L
BASOPHILS # BLD: 0 K/UL (ref 0–0.1)
BASOPHILS NFR BLD: 0 % (ref 0–1)
BDY SITE: ABNORMAL
BDY SITE: ABNORMAL
BILIRUB DIRECT SERPL-MCNC: 0.1 MG/DL (ref 0–0.2)
BILIRUB SERPL-MCNC: 0.4 MG/DL (ref 0.2–1)
BILIRUB SERPL-MCNC: 0.4 MG/DL (ref 0.2–1)
BLD PROD TYP BPU: NORMAL
BPU ID: NORMAL
BUN SERPL-MCNC: 15 MG/DL (ref 6–20)
BUN/CREAT SERPL: 21 (ref 12–20)
CALCIUM SERPL-MCNC: 8.6 MG/DL (ref 8.5–10.1)
CHLORIDE SERPL-SCNC: 107 MMOL/L (ref 97–108)
CO2 SERPL-SCNC: 28 MMOL/L (ref 21–32)
CREAT SERPL-MCNC: 0.71 MG/DL (ref 0.55–1.02)
CRP SERPL-MCNC: 10.8 MG/DL (ref 0–0.6)
DIFFERENTIAL METHOD BLD: ABNORMAL
ECHO AO ASC DIAM: 2.87 CM
ECHO AO ROOT DIAM: 3.19 CM
ECHO AV AREA PEAK VELOCITY: 2.94 CM2
ECHO AV AREA VTI: 2.87 CM2
ECHO AV AREA/BSA PEAK VELOCITY: 1.4 CM2/M2
ECHO AV AREA/BSA VTI: 1.4 CM2/M2
ECHO AV MEAN GRADIENT: 4.01 MMHG
ECHO AV PEAK GRADIENT: 7.27 MMHG
ECHO AV PEAK VELOCITY: 134.78 CM/S
ECHO AV VTI: 36.54 CM
ECHO IVC PROX: 2.75 CM
ECHO LA AREA 4C: 28.8 CM2
ECHO LA MAJOR AXIS: 4.24 CM
ECHO LA MINOR AXIS: 2.01 CM
ECHO LA VOL 2C: 64.51 ML (ref 22–52)
ECHO LA VOL 4C: 92.75 ML (ref 22–52)
ECHO LA VOL BP: 92.33 ML (ref 22–52)
ECHO LA VOL/BSA BIPLANE: 43.77 ML/M2 (ref 16–28)
ECHO LA VOLUME INDEX A2C: 30.58 ML/M2 (ref 16–28)
ECHO LA VOLUME INDEX A4C: 43.97 ML/M2 (ref 16–28)
ECHO LV E' LATERAL VELOCITY: 16.55 CENTIMETER/SECOND
ECHO LV E' SEPTAL VELOCITY: 10.66 CENTIMETER/SECOND
ECHO LV INTERNAL DIMENSION DIASTOLIC: 4.85 CM (ref 3.9–5.3)
ECHO LV INTERNAL DIMENSION SYSTOLIC: 3.32 CM
ECHO LV IVSD: 1.36 CM (ref 0.6–0.9)
ECHO LV MASS 2D: 253.9 G (ref 67–162)
ECHO LV MASS INDEX 2D: 120.4 G/M2 (ref 43–95)
ECHO LV POSTERIOR WALL DIASTOLIC: 1.27 CM (ref 0.6–0.9)
ECHO LVOT DIAM: 2.04 CM
ECHO LVOT PEAK GRADIENT: 5.93 MMHG
ECHO LVOT PEAK VELOCITY: 121.71 CM/S
ECHO LVOT SV: 104.8 ML
ECHO LVOT VTI: 32.19 CM
ECHO MV A VELOCITY: 63.81 CENTIMETER/SECOND
ECHO MV AREA PHT: 3.63 CM2
ECHO MV E DECELERATION TIME (DT): 209.06 MS
ECHO MV E VELOCITY: 109.58 CENTIMETER/SECOND
ECHO MV PRESSURE HALF TIME (PHT): 60.63 MS
ECHO PV MAX VELOCITY: 90.48 CM/S
ECHO PV PEAK INSTANTANEOUS GRADIENT SYSTOLIC: 3.35 MMHG
ECHO RV INTERNAL DIMENSION: 3.86 CM
ECHO RV TAPSE: 2.5 CM (ref 1.5–2)
ECHO TV REGURGITANT MAX VELOCITY: 233.2 CM/S
ECHO TV REGURGITANT PEAK GRADIENT: 21.75 MMHG
EOSINOPHIL # BLD: 0 K/UL (ref 0–0.4)
EOSINOPHIL NFR BLD: 0 % (ref 0–7)
ERYTHROCYTE [DISTWIDTH] IN BLOOD BY AUTOMATED COUNT: 14.5 % (ref 11.5–14.5)
FIO2 ON VENT: 100 %
FIO2 ON VENT: 80 %
GAS FLOW.O2 SETTING OXYMISER: 4 L/MIN
GLOBULIN SER CALC-MCNC: 4.3 G/DL (ref 2–4)
GLOBULIN SER CALC-MCNC: 4.3 G/DL (ref 2–4)
GLUCOSE SERPL-MCNC: 121 MG/DL (ref 65–100)
HCO3 BLDA-SCNC: 27 MMOL/L (ref 22–26)
HCO3 BLDA-SCNC: 27 MMOL/L (ref 22–26)
HCT VFR BLD AUTO: 35.1 % (ref 35–47)
HGB BLD-MCNC: 10.7 G/DL (ref 11.5–16)
IMM GRANULOCYTES # BLD AUTO: 0 K/UL (ref 0–0.04)
IMM GRANULOCYTES NFR BLD AUTO: 1 % (ref 0–0.5)
IPAP/PIP, IPAPIP: 16
IPAP/PIP, IPAPIP: 20
LA VOL DISK BP: 84.62 ML (ref 22–52)
LVOT MG: 3.31 MMHG
LYMPHOCYTES # BLD: 0.8 K/UL (ref 0.8–3.5)
LYMPHOCYTES NFR BLD: 21 % (ref 12–49)
MCH RBC QN AUTO: 26.4 PG (ref 26–34)
MCHC RBC AUTO-ENTMCNC: 30.5 G/DL (ref 30–36.5)
MCV RBC AUTO: 86.5 FL (ref 80–99)
MONOCYTES # BLD: 0.2 K/UL (ref 0–1)
MONOCYTES NFR BLD: 5 % (ref 5–13)
NEUTS SEG # BLD: 2.7 K/UL (ref 1.8–8)
NEUTS SEG NFR BLD: 73 % (ref 32–75)
NRBC # BLD: 0 K/UL (ref 0–0.01)
NRBC BLD-RTO: 0 PER 100 WBC
PCO2 BLDA: 49 MMHG (ref 35–45)
PCO2 BLDA: 53 MMHG (ref 35–45)
PEEP RESPIRATORY: 10 CM[H2O]
PEEP RESPIRATORY: 14 CM[H2O]
PH BLDA: 7.33 [PH] (ref 7.35–7.45)
PH BLDA: 7.37 [PH] (ref 7.35–7.45)
PLATELET # BLD AUTO: 191 K/UL (ref 150–400)
PMV BLD AUTO: 11.4 FL (ref 8.9–12.9)
PO2 BLDA: 127 MMHG (ref 80–100)
PO2 BLDA: 69 MMHG (ref 80–100)
POTASSIUM SERPL-SCNC: 4.7 MMOL/L (ref 3.5–5.1)
PROT SERPL-MCNC: 7.1 G/DL (ref 6.4–8.2)
PROT SERPL-MCNC: 7.2 G/DL (ref 6.4–8.2)
RBC # BLD AUTO: 4.06 M/UL (ref 3.8–5.2)
RBC MORPH BLD: ABNORMAL
SAO2 % BLD: 93 % (ref 92–97)
SAO2 % BLD: 98 % (ref 92–97)
SAO2% DEVICE SAO2% SENSOR NAME: ABNORMAL
SAO2% DEVICE SAO2% SENSOR NAME: ABNORMAL
SODIUM SERPL-SCNC: 141 MMOL/L (ref 136–145)
SPECIMEN SITE: ABNORMAL
SPECIMEN SITE: ABNORMAL
STATUS OF UNIT,%ST: NORMAL
UNIT DIVISION, %UDIV: 0
WBC # BLD AUTO: 3.7 K/UL (ref 3.6–11)

## 2021-04-04 PROCEDURE — 65610000006 HC RM INTENSIVE CARE

## 2021-04-04 PROCEDURE — C9113 INJ PANTOPRAZOLE SODIUM, VIA: HCPCS | Performed by: INTERNAL MEDICINE

## 2021-04-04 PROCEDURE — 94640 AIRWAY INHALATION TREATMENT: CPT

## 2021-04-04 PROCEDURE — 74011000250 HC RX REV CODE- 250: Performed by: INTERNAL MEDICINE

## 2021-04-04 PROCEDURE — 77030005513 HC CATH URETH FOL11 MDII -B

## 2021-04-04 PROCEDURE — 74011250636 HC RX REV CODE- 250/636: Performed by: INTERNAL MEDICINE

## 2021-04-04 PROCEDURE — 36600 WITHDRAWAL OF ARTERIAL BLOOD: CPT

## 2021-04-04 PROCEDURE — 77010033711 HC HIGH FLOW OXYGEN

## 2021-04-04 PROCEDURE — 80076 HEPATIC FUNCTION PANEL: CPT

## 2021-04-04 PROCEDURE — 36415 COLL VENOUS BLD VENIPUNCTURE: CPT

## 2021-04-04 PROCEDURE — 74011250636 HC RX REV CODE- 250/636: Performed by: NURSE PRACTITIONER

## 2021-04-04 PROCEDURE — 93970 EXTREMITY STUDY: CPT

## 2021-04-04 PROCEDURE — 71045 X-RAY EXAM CHEST 1 VIEW: CPT

## 2021-04-04 PROCEDURE — 85025 COMPLETE CBC W/AUTO DIFF WBC: CPT

## 2021-04-04 PROCEDURE — 87086 URINE CULTURE/COLONY COUNT: CPT

## 2021-04-04 PROCEDURE — 94660 CPAP INITIATION&MGMT: CPT

## 2021-04-04 PROCEDURE — 74011250637 HC RX REV CODE- 250/637: Performed by: INTERNAL MEDICINE

## 2021-04-04 PROCEDURE — C8929 TTE W OR WO FOL WCON,DOPPLER: HCPCS

## 2021-04-04 PROCEDURE — 77030038269 HC DRN EXT URIN PURWCK BARD -A

## 2021-04-04 PROCEDURE — 82803 BLOOD GASES ANY COMBINATION: CPT

## 2021-04-04 PROCEDURE — 74011000258 HC RX REV CODE- 258: Performed by: INTERNAL MEDICINE

## 2021-04-04 PROCEDURE — 74011000250 HC RX REV CODE- 250: Performed by: NURSE PRACTITIONER

## 2021-04-04 PROCEDURE — 80053 COMPREHEN METABOLIC PANEL: CPT

## 2021-04-04 PROCEDURE — 93306 TTE W/DOPPLER COMPLETE: CPT | Performed by: STUDENT IN AN ORGANIZED HEALTH CARE EDUCATION/TRAINING PROGRAM

## 2021-04-04 PROCEDURE — 86140 C-REACTIVE PROTEIN: CPT

## 2021-04-04 RX ORDER — FUROSEMIDE 10 MG/ML
20 INJECTION INTRAMUSCULAR; INTRAVENOUS ONCE
Status: COMPLETED | OUTPATIENT
Start: 2021-04-04 | End: 2021-04-04

## 2021-04-04 RX ORDER — ALBUTEROL SULFATE 90 UG/1
2 AEROSOL, METERED RESPIRATORY (INHALATION)
Status: DISCONTINUED | OUTPATIENT
Start: 2021-04-04 | End: 2021-04-11 | Stop reason: HOSPADM

## 2021-04-04 RX ORDER — BUDESONIDE AND FORMOTEROL FUMARATE DIHYDRATE 160; 4.5 UG/1; UG/1
2 AEROSOL RESPIRATORY (INHALATION)
Status: DISCONTINUED | OUTPATIENT
Start: 2021-04-04 | End: 2021-04-11 | Stop reason: HOSPADM

## 2021-04-04 RX ORDER — ALBUTEROL SULFATE 0.83 MG/ML
2.5 SOLUTION RESPIRATORY (INHALATION)
Status: DISCONTINUED | OUTPATIENT
Start: 2021-04-04 | End: 2021-04-11 | Stop reason: HOSPADM

## 2021-04-04 RX ORDER — LORAZEPAM 2 MG/ML
0.5 INJECTION INTRAMUSCULAR
Status: DISCONTINUED | OUTPATIENT
Start: 2021-04-04 | End: 2021-04-11 | Stop reason: HOSPADM

## 2021-04-04 RX ORDER — DEXMEDETOMIDINE HYDROCHLORIDE 4 UG/ML
.1-1.5 INJECTION, SOLUTION INTRAVENOUS
Status: DISCONTINUED | OUTPATIENT
Start: 2021-04-04 | End: 2021-04-07

## 2021-04-04 RX ORDER — IPRATROPIUM BROMIDE AND ALBUTEROL SULFATE 2.5; .5 MG/3ML; MG/3ML
3 SOLUTION RESPIRATORY (INHALATION)
Status: DISCONTINUED | OUTPATIENT
Start: 2021-04-04 | End: 2021-04-11 | Stop reason: HOSPADM

## 2021-04-04 RX ORDER — DEXAMETHASONE SODIUM PHOSPHATE 4 MG/ML
6 INJECTION, SOLUTION INTRA-ARTICULAR; INTRALESIONAL; INTRAMUSCULAR; INTRAVENOUS; SOFT TISSUE EVERY 24 HOURS
Status: DISCONTINUED | OUTPATIENT
Start: 2021-04-04 | End: 2021-04-11 | Stop reason: HOSPADM

## 2021-04-04 RX ADMIN — Medication 10 ML: at 00:25

## 2021-04-04 RX ADMIN — HYDROXYZINE HYDROCHLORIDE 10 MG: 10 TABLET, FILM COATED ORAL at 07:20

## 2021-04-04 RX ADMIN — AZITHROMYCIN MONOHYDRATE 500 MG: 500 INJECTION, POWDER, LYOPHILIZED, FOR SOLUTION INTRAVENOUS at 10:43

## 2021-04-04 RX ADMIN — REMDESIVIR 100 MG: 100 INJECTION, POWDER, LYOPHILIZED, FOR SOLUTION INTRAVENOUS at 05:33

## 2021-04-04 RX ADMIN — Medication 10 ML: at 13:42

## 2021-04-04 RX ADMIN — IPRATROPIUM BROMIDE AND ALBUTEROL SULFATE 3 ML: .5; 2.5 SOLUTION RESPIRATORY (INHALATION) at 19:35

## 2021-04-04 RX ADMIN — ENOXAPARIN SODIUM 60 MG: 60 INJECTION SUBCUTANEOUS at 10:33

## 2021-04-04 RX ADMIN — LORAZEPAM 0.5 MG: 0.5 TABLET ORAL at 02:38

## 2021-04-04 RX ADMIN — PERFLUTREN 2 ML: 6.52 INJECTION, SUSPENSION INTRAVENOUS at 09:02

## 2021-04-04 RX ADMIN — ENOXAPARIN SODIUM 60 MG: 60 INJECTION SUBCUTANEOUS at 20:05

## 2021-04-04 RX ADMIN — ARFORMOTEROL TARTRATE: 15 SOLUTION RESPIRATORY (INHALATION) at 09:21

## 2021-04-04 RX ADMIN — MORPHINE SULFATE 2 MG: 2 INJECTION, SOLUTION INTRAMUSCULAR; INTRAVENOUS at 21:02

## 2021-04-04 RX ADMIN — LORAZEPAM 0.5 MG: 2 INJECTION INTRAMUSCULAR; INTRAVENOUS at 15:21

## 2021-04-04 RX ADMIN — METHYLPREDNISOLONE SODIUM SUCCINATE 40 MG: 40 INJECTION, POWDER, FOR SOLUTION INTRAMUSCULAR; INTRAVENOUS at 00:25

## 2021-04-04 RX ADMIN — Medication 10 ML: at 05:11

## 2021-04-04 RX ADMIN — DEXAMETHASONE SODIUM PHOSPHATE 6 MG: 4 INJECTION, SOLUTION INTRAMUSCULAR; INTRAVENOUS at 12:29

## 2021-04-04 RX ADMIN — DEXMEDETOMIDINE HYDROCHLORIDE 0.4 MCG/KG/HR: 400 INJECTION INTRAVENOUS at 10:11

## 2021-04-04 RX ADMIN — IPRATROPIUM BROMIDE AND ALBUTEROL SULFATE 3 ML: .5; 2.5 SOLUTION RESPIRATORY (INHALATION) at 09:15

## 2021-04-04 RX ADMIN — CEFTRIAXONE 2 G: 2 INJECTION, POWDER, FOR SOLUTION INTRAMUSCULAR; INTRAVENOUS at 10:43

## 2021-04-04 RX ADMIN — Medication 10 ML: at 21:02

## 2021-04-04 RX ADMIN — ARFORMOTEROL TARTRATE: 15 SOLUTION RESPIRATORY (INHALATION) at 19:39

## 2021-04-04 RX ADMIN — DEXMEDETOMIDINE HYDROCHLORIDE 0.4 MCG/KG/HR: 400 INJECTION INTRAVENOUS at 16:06

## 2021-04-04 RX ADMIN — METHYLPREDNISOLONE SODIUM SUCCINATE 40 MG: 40 INJECTION, POWDER, FOR SOLUTION INTRAMUSCULAR; INTRAVENOUS at 05:33

## 2021-04-04 RX ADMIN — IPRATROPIUM BROMIDE AND ALBUTEROL SULFATE 3 ML: .5; 2.5 SOLUTION RESPIRATORY (INHALATION) at 15:36

## 2021-04-04 RX ADMIN — PANTOPRAZOLE SODIUM 40 MG: 40 INJECTION, POWDER, FOR SOLUTION INTRAVENOUS at 12:29

## 2021-04-04 RX ADMIN — FUROSEMIDE 20 MG: 10 INJECTION, SOLUTION INTRAMUSCULAR; INTRAVENOUS at 10:43

## 2021-04-04 RX ADMIN — MORPHINE SULFATE 2 MG: 2 INJECTION, SOLUTION INTRAMUSCULAR; INTRAVENOUS at 05:11

## 2021-04-04 RX ADMIN — MORPHINE SULFATE 15 MG: 15 TABLET ORAL at 00:32

## 2021-04-04 NOTE — CONSULTS
Consult Date: 4/4/2021    Consults: Critical Care Medicine      Subjective    A 26 yo female with hoanxiety, HLP,hypothyroidism, obesity and asthma admitted with COVID -19 pneumonia. Pt is transferred to ICU this morning for worsening resp status overnight,requiring initiation of BiPAP  Intensivist was consulted today for management of acute hypoxemic resp failure  Symptoms started on 03/22. She was admitted to the hospital on 03/31  Pt has progressive hypoxemia and was started on BiPAP 16/10 overnight with FiO2 100% PaO2 is only 69  Repeat CXR this morning shows significant worsening bilateral infiltrates. , Procal 0. 2. CRP 11(trending down), ,Ferriting 370, D-Dimer 0.82   Pt was seen. She is on BiPAP,feelign anxious. RR 28 and TV around 600. sat 100% on the monitor. She denies SOB at rest or CP/.she reprots SOB when using bedside commode       No past medical history on file. No past surgical history on file. No family history on file.    Social History     Tobacco Use    Smoking status: Not on file   Substance Use Topics    Alcohol use: Not on file       Current Facility-Administered Medications   Medication Dose Route Frequency Provider Last Rate Last Admin    albuterol (PROVENTIL HFA, VENTOLIN HFA, PROAIR HFA) inhaler 2 Puff  2 Puff Inhalation Q4H PRN Stephan Poe MD        Or    albuterol (PROVENTIL VENTOLIN) nebulizer solution 2.5 mg  2.5 mg Nebulization Q4H PRN Stephan Poe MD        budesonide-formoteroL (SYMBICORT) 160-4.5 mcg/actuation HFA inhaler 2 Puff  2 Puff Inhalation BID RT Stephan Poe MD        Or    arformoterol 15 mcg/budesonide 0.5 mg neb solution   Nebulization BID RT Kalyn Vogel MD   Given at 04/04/21 0921    ipratropium-albuterol (COMBIVENT RESPIMAT) 20 mcg-100 mcg inhalation spray  1 Puff Inhalation Q6HWA RT Stephan Poe MD        Or    albuterol-ipratropium (DUO-NEB) 2.5 MG-0.5 MG/3 ML  3 mL Nebulization Q6HWA RT Stephan Poe MD   3 mL at 04/04/21 0915    ipratropium-albuterol (COMBIVENT RESPIMAT) 20 mcg-100 mcg inhalation spray  1 Puff Inhalation Q4H PRN Stephan Poe MD        Or    albuterol-ipratropium (DUO-NEB) 2.5 MG-0.5 MG/3 ML  3 mL Nebulization Q4H PRN Stephan Burton MD        [START ON 4/11/2021] levothyroxine (SYNTHROID) injection 65 mcg  65 mcg IntraVENous Q24H Stephan Poe MD        LORazepam (ATIVAN) 2 mg/mL injection 0.5 mg  0.5 mg IntraVENous Q6H PRN Stephan Poe MD        dexmedeTOMidine in 0.9 % NaCl (PRECEDEX) 400 mcg/100 mL (4 mcg/mL) infusion soln  0.1-1.5 mcg/kg/hr IntraVENous TITRATE Syed Herr MD        0.9% sodium chloride infusion 250 mL  250 mL IntraVENous PRN Joana Nicole MD        morphine injection 2 mg  2 mg IntraVENous Q4H PRN Delmi Frank MD   2 mg at 04/04/21 0511    zolpidem (AMBIEN) tablet 5 mg  5 mg Oral QHS PRN Joana Nicole MD        0.9% sodium chloride infusion 250 mL  250 mL IntraVENous PRN Makeda Ang, CORRINA        hydrOXYzine HCL (ATARAX) tablet 10 mg  10 mg Oral TID PRN Delmi Frank MD   10 mg at 04/04/21 0720    HYDROcodone-chlorpheniramine (TUSSIONEX) oral suspension 5 mL  5 mL Oral Q12H PRN Delmi Frank MD   5 mL at 04/03/21 2045    remdesivir 100 mg in 0.9% sodium chloride 250 mL IVPB  100 mg IntraVENous Q24H Delmi Frank MD   100 mg at 04/04/21 0533    guaiFENesin ER (MUCINEX) tablet 1,200 mg  1,200 mg Oral BID Ernst Oliva MD   1,200 mg at 04/03/21 1713    methylPREDNISolone (PF) (SOLU-MEDROL) injection 40 mg  40 mg IntraVENous Q6H Ernst Oliva MD   40 mg at 04/04/21 0533    enoxaparin (LOVENOX) injection 60 mg  60 mg SubCUTAneous Q12H Ernst Oliva MD   60 mg at 04/03/21 2045    benzonatate (TESSALON) capsule 100 mg  100 mg Oral TID PRN Delmi Frank MD   100 mg at 04/02/21 2126    guaiFENesin-dextromethorphan (ROBITUSSIN DM) 100-10 mg/5 mL syrup 5 mL  5 mL Oral Q6H PRN Delmi Frank MD   5 mL at 04/02/21 1334    morphine IR (MS IR) tablet 15 mg  15 mg Oral Q4H PRN Soha White MD   15 mg at 04/04/21 0032    rosuvastatin (CRESTOR) tablet 20 mg  20 mg Oral DAILY Stephan Poe MD   20 mg at 04/03/21 0909    sodium chloride (NS) flush 5-40 mL  5-40 mL IntraVENous Q8H Stephan Poe MD   10 mL at 04/04/21 0511    sodium chloride (NS) flush 5-40 mL  5-40 mL IntraVENous PRN Stephan Daly MD   10 mL at 04/02/21 1805    acetaminophen (TYLENOL) tablet 650 mg  650 mg Oral Q6H PRN Stephan Daly MD   650 mg at 04/02/21 1575    Or    acetaminophen (TYLENOL) suppository 650 mg  650 mg Rectal Q6H PRN Stephan Poe MD        polyethylene glycol (MIRALAX) packet 17 g  17 g Oral DAILY PRN Stephan Poe MD        promethazine (PHENERGAN) tablet 12.5 mg  12.5 mg Oral Q6H PRN Stephan Poe MD        Or    ondansetron (ZOFRAN) injection 4 mg  4 mg IntraVENous Q6H PRN Stephan Poe MD   4 mg at 04/02/21 0006    ascorbic acid (vitamin C) (VITAMIN C) tablet 500 mg  500 mg Oral DAILY Stephan Poe MD   500 mg at 04/03/21 0909    zinc gluconate tablet 50 mg  50 mg Oral DAILY Raquel Adam MD   50 mg at 04/03/21 7211        Review of Systems  Unable to obtain given resp status and BiPAP use. Objective     Vital signs for last 24 hours:  Visit Vitals  /68   Pulse 84   Temp 98.9 °F (37.2 °C)   Resp 16   Ht 5' 3\" (1.6 m)   Wt 141.1 kg (311 lb)   SpO2 94%   BMI 55.09 kg/m²       Intake/Output this shift:  Current Shift: No intake/output data recorded. Last 3 Shifts: 04/02 1901 - 04/04 0700  In: 1639.5 [P.O.:740;  I.V.:400]  Out: 700 [Urine:700]    Data Review:   Recent Results (from the past 24 hour(s))   BLOOD GAS, ARTERIAL    Collection Time: 04/04/21  3:55 AM   Result Value Ref Range    pH 7.33 (L) 7.35 - 7.45      PCO2 53 (H) 35 - 45 mmHg    PO2 69 (L) 80 - 100 mmHg    O2 SAT 93 92 - 97 %    BICARBONATE 27 (H) 22 - 26 mmol/L    BASE EXCESS 0.2 mmol/L    O2 METHOD BIPAP      FIO2 100 % IPAP/PIP 16      PEEP/CPAP 10.0      Sample source ARTERIAL      SITE LEFT RADIAL      LORETTA'S TEST YES     METABOLIC PANEL, COMPREHENSIVE    Collection Time: 04/04/21  5:38 AM   Result Value Ref Range    Sodium 141 136 - 145 mmol/L    Potassium 4.7 3.5 - 5.1 mmol/L    Chloride 107 97 - 108 mmol/L    CO2 28 21 - 32 mmol/L    Anion gap 6 5 - 15 mmol/L    Glucose 121 (H) 65 - 100 mg/dL    BUN 15 6 - 20 MG/DL    Creatinine 0.71 0.55 - 1.02 MG/DL    BUN/Creatinine ratio 21 (H) 12 - 20      GFR est AA >60 >60 ml/min/1.73m2    GFR est non-AA >60 >60 ml/min/1.73m2    Calcium 8.6 8.5 - 10.1 MG/DL    Bilirubin, total 0.4 0.2 - 1.0 MG/DL    ALT (SGPT) 70 12 - 78 U/L    AST (SGOT) 50 (H) 15 - 37 U/L    Alk. phosphatase 72 45 - 117 U/L    Protein, total 7.1 6.4 - 8.2 g/dL    Albumin 2.8 (L) 3.5 - 5.0 g/dL    Globulin 4.3 (H) 2.0 - 4.0 g/dL    A-G Ratio 0.7 (L) 1.1 - 2.2     CBC WITH AUTOMATED DIFF    Collection Time: 04/04/21  5:38 AM   Result Value Ref Range    WBC 3.7 3.6 - 11.0 K/uL    RBC 4.06 3.80 - 5.20 M/uL    HGB 10.7 (L) 11.5 - 16.0 g/dL    HCT 35.1 35.0 - 47.0 %    MCV 86.5 80.0 - 99.0 FL    MCH 26.4 26.0 - 34.0 PG    MCHC 30.5 30.0 - 36.5 g/dL    RDW 14.5 11.5 - 14.5 %    PLATELET 788 170 - 807 K/uL    MPV 11.4 8.9 - 12.9 FL    NRBC 0.0 0  WBC    ABSOLUTE NRBC 0.00 0.00 - 0.01 K/uL    NEUTROPHILS 73 32 - 75 %    LYMPHOCYTES 21 12 - 49 %    MONOCYTES 5 5 - 13 %    EOSINOPHILS 0 0 - 7 %    BASOPHILS 0 0 - 1 %    IMMATURE GRANULOCYTES 1 (H) 0.0 - 0.5 %    ABS. NEUTROPHILS 2.7 1.8 - 8.0 K/UL    ABS. LYMPHOCYTES 0.8 0.8 - 3.5 K/UL    ABS. MONOCYTES 0.2 0.0 - 1.0 K/UL    ABS. EOSINOPHILS 0.0 0.0 - 0.4 K/UL    ABS. BASOPHILS 0.0 0.0 - 0.1 K/UL    ABS. IMM.  GRANS. 0.0 0.00 - 0.04 K/UL    DF SMEAR SCANNED      RBC COMMENTS NORMOCYTIC, NORMOCHROMIC     HEPATIC FUNCTION PANEL    Collection Time: 04/04/21  5:38 AM   Result Value Ref Range    Protein, total 7.2 6.4 - 8.2 g/dL    Albumin 2.9 (L) 3.5 - 5.0 g/dL    Globulin 4.3 (H) 2.0 - 4.0 g/dL    A-G Ratio 0.7 (L) 1.1 - 2.2      Bilirubin, total 0.4 0.2 - 1.0 MG/DL    Bilirubin, direct 0.1 0.0 - 0.2 MG/DL    Alk.  phosphatase 72 45 - 117 U/L    AST (SGOT) 52 (H) 15 - 37 U/L    ALT (SGPT) 71 12 - 78 U/L   C REACTIVE PROTEIN, QT    Collection Time: 04/04/21  5:38 AM   Result Value Ref Range    C-Reactive protein 10.80 (H) 0.00 - 0.60 mg/dL   ECHO ADULT COMPLETE    Collection Time: 04/04/21  9:01 AM   Result Value Ref Range    IVSd 1.36 (A) 0.60 - 0.90 cm    LVIDd 4.85 3.90 - 5.30 cm    LVIDs 3.32 cm    LVOT d 2.04 cm    LVPWd 1.27 (A) 0.60 - 0.90 cm    LVOT Peak Gradient 5.93 mmHg    Left Ventricular Outflow Tract Mean Gradient 3.31 mmHg    LVOT .8 mL    LVOT Peak Velocity 121.71 cm/s    LVOT VTI 32.19 cm    RVIDd 3.86 cm    Left Atrium Major Axis 4.24 cm    LA Volume 92.33 22.0 - 52.0 mL    LA Area 4C 28.80 cm2    LA Vol 2C 64.51 (A) 22.00 - 52.00 mL    LA Vol 4C 92.75 (A) 22.00 - 52.00 mL    LA Volume DISK BP 84.62 22.0 - 52.0 mL    Aortic Valve Area by Continuity of Peak Velocity 2.94 cm2    Aortic Valve Area by Continuity of VTI 2.87 cm2    AoV PG 7.27 mmHg    Aortic Valve Systolic Mean Gradient 9.35 mmHg    Aortic Valve Systolic Peak Velocity 299.06 cm/s    AoV VTI 36.54 cm    MV A Harshad 63.81 centimeter/second    Mitral Valve E Wave Deceleration Time 209.06 ms    MV E Harshad 109.58 centimeter/second    LV E' Lateral Velocity 16.55 centimeter/second    LV E' Septal Velocity 10.66 centimeter/second    Mitral Valve Pressure Half-time 60.63 ms    MVA (PHT) 3.63 cm2    Pulmonic Valve Systolic Peak Instantaneous Gradient 3.35 mmHg    Pulmonic Valve Max Velocity 90.48 cm/s    Tapse 2.50 (A) 1.50 - 2.00 cm    Triscuspid Valve Regurgitation Peak Gradient 21.75 mmHg    TR Max Velocity 233.20 cm/s    AO ASC D 2.87 cm    Ao Root D 3.19 cm    IVC proximal 2.75 cm    LV Mass .9 67.0 - 162.0 g    LV Mass AL Index 108.8 43.0 - 95.0 g/m2    Left Atrium Minor Axis 1.82 cm    LA Vol Index 39.58 16.00 - 28.00 ml/m2    LA Vol Index 27.65 16.00 - 28.00 ml/m2    LA Vol Index 39.76 16.00 - 28.00 ml/m2    DEREK/BSA Pk Harshad 1.3 cm2/m2    DEREK/BSA VTI 1.2 cm2/m2       Physical Exam      I examined the patient via telemedicine, with its associated limitations. I reviewed the electronic medical record, the x-rays, labs, progress notes, previous history and physicals and consultation notes that were available in the electronic medical record. I beamed in and examined the patient    On exam:    Felipa Deem appropriately  On BiPAP   S1,S2  Decreased breath sounds bilateral .+ve crackles. Abdomen soft, ND  LE:No edema  MANRIQUEZ    Assessment:     A 26 yo female with ho anxiety, HLP,hypothyroidism, obesity and asthma admitted with COVID -19 pneumonia,transferred to ICU with acute hypoxemic resp failure    # Acute Hypoxemic Resp Failure  # COVID 19 pneumonia  # ARDS      Plan:  Start Precedex to facilitate NiPPV compliance. Continue BiPAP. Increase EPAP to 14 given significant atelectasis at the bases. Increase iPAP to 20. Keep sat>92%  Encourage self prone,though difficult given body habitus and anxiety. Follow CXR and ABG. High risk for decompensation given significant disease progression. If intubated,we will need to consider ECMO evaluation   DC solumedrol. Start Decadron 6 mg daily  S/p Actemra 4/2 and Plasma 04/03  On Remedsivir   On ceftriaxone and azithromycin   Minimize opioids   Lasix *1. Place carrasco for strict I/o  NPO today. Nebs    Protonix  SQ Lovenox 60 mg Q12 hrs. I performed all aspects of the physical examination via Telemedicine associated with two way audio and video communication and with the on-site assistance of  the bedside nurse. I am located in [place], [place] and the patient is located in [place] at [facility]. The patient is critically ill in the ICU.    I  personally  reviewed the pertinent medical records, laboratory/ pathology data and radiographic images. The decision making regarding this patient is as documented above, which was generated  following  discussion  with the multidisciplinary ICU team and creation of a treatment plan for  the patient. We discussed the patient's interval history and future coordination of care and  plans. The patient's medications  were reviewed and changes made as stipulated above. Due to  critical illness impairing one or more vital organs of this patient resulting in life threatening clinical situation  I have provided direct, frequent personal  assessment and manipulation in management plan and spent 60 minutes  of  critical care time excluding the time spent on procedures and teaching. Greater than 50% of this time  in patients care was  employed  in counseling and coordination of care and engaged in face to face discussion of case management issues, addressing questions, and outlining a plan of  therapy. Pt at risk of life threatening deterioration from acute resp failure, COVID-19 pneumonia    Pt seen and evaluated via tele encounter. Audio and Video were used for this interaction.

## 2021-04-04 NOTE — PROGRESS NOTES
1900: Bedside shift change report given to Shay Kunz RN (oncoming nurse) by Niya Ross RN (offgoing nurse). Report included the following information SBAR, Kardex, Procedure Summary, Intake/Output, MAR, Recent Results and Cardiac Rhythm NSR. Primary Nurse Howie Kirkpatrick RN and ONELIA Wilson performed a dual skin assessment on this patient No impairment noted  Alf score is 21    2000: Shift  Assessment completed            Mental Status:  A/Ox4            Respiratory: Lungs diminished, Hi-Daniel 50L 90%            Cardiac: VSS, S1S2, NSR            GI/: Voiding, bowel sounds present            IV Drips: 22RH, 20RAC SL    2200: No changes, pt resting    0000: Reassessment completed. Changes noted, see flow sheet. 0300: Pt desating to upper 70s, Hi-Flow maxed with O2 in mid 80s. Dr. Manning Postal notified, order received for BiPAP and ABGs. RT notified. 0400: Reassessment completed. Changes noted, see flow sheet. Pt anxious and tearful but tolerating BiPAP, Sats in upper 90s.     0500: Labs drawn    0700: Bedside shift change report given to ONELIA Patino and [employer], RN (oncoming nurse) by Shay Kunz RN (offgoing nurse). Report included the following information SBAR, Kardex, Procedure Summary, Intake/Output, MAR, Recent Results and Cardiac Rhythm NSR/ST.

## 2021-04-04 NOTE — PROGRESS NOTES
Name: [facility]: [address]   : 1990 Admit Date: 3/31/2021   Phone: [phone]  [facility]   PCP: Marques Durham MD  MRN: [id number]   Date: 2021  Code: Full Code        HPI:      Chart and notes reviewed. Data reviewed. I review the patient's current medications in the medical record at each encounter.  I have evaluated and examined the patient. 10:59 AM       History was obtained from patient. I was asked by Chencho Bhakta MD to see Humera Swain in consultation for a chief complaint of Covid-19, new hypoxia. History of Present Illness:   is a very pleasant, 28 yo lady with a PMH of asthma, hyperlipidemia, anxiety/depression, hypothyroidism, and obesity that presented to [facility] on 3/31 with worsening shortness of breath. Reports that her symptoms initially began on 3/22, but did not become severe until a few days ago. She is visiting from [place] and was taking care of her grandmother on hospice who passed away 1 week ago. She states that overnight, her symptoms became worse and her SOB became worse. She finds it difficult to take a deep breath. Denies fever or chills. No significant cough. No CP, LE pain, or swelling. She has a history of asthma and is followed by a pulmonologist in [place]. Typically her asthma is well controlled on Wixela 500/50. When she is well, she never uses rescue agents or her nebulizer. Rarely exacerbates. Nonsmoker. Yesterday, she was on RA and this morning desaturated; now on 7L. Images:  Personally reviewed. Chest CTA:   No PE. Diffuse bilateral infiltrates/conslidation. WBC 4.7  PCT . 20  D-dimer . 82  Pro-  CRP : 6.75    Interval History:    Afebrile overnight  BP soft  On max HF overnight, now on BiPap, sats 94%  ABG 7.33/53/69/27/93%  WBC 3.7  CRP 10.8; better  LFTs stable  S/p Actemra x1 on   No documentation of UO    ROS:  Viewed from window as patient getting ECHO.  Resting on BiPap. No past medical history on file. No past surgical history on file. No family history on file.     Social History     Tobacco Use    Smoking status: Not on file   Substance Use Topics    Alcohol use: Not on file       Allergies   Allergen Reactions    Codeine Nausea and Vomiting       Current Facility-Administered Medications   Medication Dose Route Frequency    albuterol (PROVENTIL HFA, VENTOLIN HFA, PROAIR HFA) inhaler 2 Puff  2 Puff Inhalation Q4H PRN    Or    albuterol (PROVENTIL VENTOLIN) nebulizer solution 2.5 mg  2.5 mg Nebulization Q4H PRN    budesonide-formoteroL (SYMBICORT) 160-4.5 mcg/actuation HFA inhaler 2 Puff  2 Puff Inhalation BID RT    Or    arformoterol 15 mcg/budesonide 0.5 mg neb solution   Nebulization BID RT    ipratropium-albuterol (COMBIVENT RESPIMAT) 20 mcg-100 mcg inhalation spray  1 Puff Inhalation Q6HWA RT    Or    albuterol-ipratropium (DUO-NEB) 2.5 MG-0.5 MG/3 ML  3 mL Nebulization Q6HWA RT    ipratropium-albuterol (COMBIVENT RESPIMAT) 20 mcg-100 mcg inhalation spray  1 Puff Inhalation Q4H PRN    Or    albuterol-ipratropium (DUO-NEB) 2.5 MG-0.5 MG/3 ML  3 mL Nebulization Q4H PRN    [START ON 4/11/2021] levothyroxine (SYNTHROID) injection 65 mcg  65 mcg IntraVENous Q24H    LORazepam (ATIVAN) 2 mg/mL injection 0.5 mg  0.5 mg IntraVENous Q6H PRN    0.9% sodium chloride infusion 250 mL  250 mL IntraVENous PRN    morphine injection 2 mg  2 mg IntraVENous Q4H PRN    zolpidem (AMBIEN) tablet 5 mg  5 mg Oral QHS PRN    0.9% sodium chloride infusion 250 mL  250 mL IntraVENous PRN    hydrOXYzine HCL (ATARAX) tablet 10 mg  10 mg Oral TID PRN    HYDROcodone-chlorpheniramine (TUSSIONEX) oral suspension 5 mL  5 mL Oral Q12H PRN    remdesivir 100 mg in 0.9% sodium chloride 250 mL IVPB  100 mg IntraVENous Q24H    guaiFENesin ER (MUCINEX) tablet 1,200 mg  1,200 mg Oral BID    methylPREDNISolone (PF) (SOLU-MEDROL) injection 40 mg  40 mg IntraVENous Q6H    enoxaparin (LOVENOX) injection 60 mg  60 mg SubCUTAneous Q12H    benzonatate (TESSALON) capsule 100 mg  100 mg Oral TID PRN    guaiFENesin-dextromethorphan (ROBITUSSIN DM) 100-10 mg/5 mL syrup 5 mL  5 mL Oral Q6H PRN    morphine IR (MS IR) tablet 15 mg  15 mg Oral Q4H PRN    rosuvastatin (CRESTOR) tablet 20 mg  20 mg Oral DAILY    sodium chloride (NS) flush 5-40 mL  5-40 mL IntraVENous Q8H    sodium chloride (NS) flush 5-40 mL  5-40 mL IntraVENous PRN    acetaminophen (TYLENOL) tablet 650 mg  650 mg Oral Q6H PRN    Or    acetaminophen (TYLENOL) suppository 650 mg  650 mg Rectal Q6H PRN    polyethylene glycol (MIRALAX) packet 17 g  17 g Oral DAILY PRN    promethazine (PHENERGAN) tablet 12.5 mg  12.5 mg Oral Q6H PRN    Or    ondansetron (ZOFRAN) injection 4 mg  4 mg IntraVENous Q6H PRN    ascorbic acid (vitamin C) (VITAMIN C) tablet 500 mg  500 mg Oral DAILY    zinc gluconate tablet 50 mg  50 mg Oral DAILY         REVIEW OF SYSTEMS   Negative except as stated in the HPI. Physical Exam:   Visit Vitals  BP 94/62   Pulse (!) 53   Temp 97.5 °F (36.4 °C)   Resp 29   Ht 5' 3\" (1.6 m)   Wt 141.1 kg (311 lb)   SpO2 94%   BMI 55.09 kg/m²       General:  Alert, ill appearing, on Bipap   Head:  Normocephalic, without obvious abnormality, atraumatic. Eyes:  Conjunctivae/corneas clear. Nose: Nares normal. Septum midline. Mucosa normal.    Throat: Lips, mucosa, and tongue normal.    Neck: Supple, symmetrical, trachea midline, no adenopathy   Lungs:   Deferred   Chest wall:  No tenderness or deformity.    Heart:  Regular rate and rhythm   Abdomen:      Extremities:    Pulses:    Skin: Skin color normal   Lymph nodes:    Neurologic:        Lab Results   Component Value Date/Time    Sodium 141 04/04/2021 05:38 AM    Potassium 4.7 04/04/2021 05:38 AM    Chloride 107 04/04/2021 05:38 AM    CO2 28 04/04/2021 05:38 AM    BUN 15 04/04/2021 05:38 AM    Creatinine 0.71 04/04/2021 05:38 AM    Glucose 121 (H) 04/04/2021 05:38 AM    Calcium 8.6 04/04/2021 05:38 AM       Lab Results   Component Value Date/Time    WBC 3.7 04/04/2021 05:38 AM    HGB 10.7 (L) 04/04/2021 05:38 AM    PLATELET 643 29/87/5225 05:38 AM    MCV 86.5 04/04/2021 05:38 AM       Lab Results   Component Value Date/Time    Alk. phosphatase 72 04/04/2021 05:38 AM    Alk. phosphatase 72 04/04/2021 05:38 AM    Protein, total 7.1 04/04/2021 05:38 AM    Protein, total 7.2 04/04/2021 05:38 AM    Albumin 2.8 (L) 04/04/2021 05:38 AM    Albumin 2.9 (L) 04/04/2021 05:38 AM    Globulin 4.3 (H) 04/04/2021 05:38 AM    Globulin 4.3 (H) 04/04/2021 05:38 AM       Lab Results   Component Value Date/Time    Ferritin 370 04/01/2021 05:35 AM       Lab Results   Component Value Date/Time    C-Reactive protein 10.80 (H) 04/04/2021 05:38 AM    TSH 3.37 03/31/2021 10:43 PM        Lab Results   Component Value Date/Time    PH 7.33 (L) 04/04/2021 03:55 AM    PCO2 53 (H) 04/04/2021 03:55 AM    PO2 69 (L) 04/04/2021 03:55 AM    HCO3 27 (H) 04/04/2021 03:55 AM    FIO2 100 04/04/2021 03:55 AM       Lab Results   Component Value Date/Time    Troponin-I, Qt. <0.05 04/01/2021 08:45 PM        No results found for: CULT    No results found for: TOXA1, RPR, HBCM, HBSAG, HAAB, HCAB1, HAAT, G6PD, CRYAC, HIVGT, HIVR, HIV1, HIV12, HIVPC, HIVRPI    No results found for: VANCT, CPK    No results found for: COLOR, APPRN, SPGRU, EUSEBIO, PROTU, GLUCU, KETU, BILU, BLDU, UROU, SORAIDA, LEUKU, WBCU, RBCU, UEPI, BACTU, CASTS, UCRY    IMPRESSION  · Acute Respiratory Failure with Hypoxia  · Covid-19 PNA  · Obesity  · Asthma; not in acute exacerbation    PLAN  · BiPap to keep sats >90%; wean to HF as WOB improves  · Continue Solu-Medrol  · Combivent scheduled  · Lovenox 60mg q12h  · Recheck d-dimer  · CXR now  · LE dopplers  · ECHO results pending  · Remdesivir. Watch LFTs, renal function.   · S/p Actemra 4/2  · F/U Quant gold and hepatitis panel  · S/p convalescent plasma 4/3  · Encourage IS use  · OOB when more stable  · Prone as able  · Recommend ICU transfer    Patient is critically ill and at high risk for decompensation. Total CC time: 45 minutes.         Marcelino Clarke NP

## 2021-04-04 NOTE — PROGRESS NOTES
Spiritual Care Assessment/Progress Note  [address]      NAME: Shameka Yang      MRN: [id number]  AGE: 27 y.o. SEX: female  Jainism Affiliation: Unknown   Language: English     4/4/2021     Total Time (in minutes): 8     Spiritual Assessment begun in [facility] INTENSIVE CARE through conversation with:         []Patient        [] Family    [] Friend(s)        Reason for Consult: Initial/Spiritual assessment, critical care     Spiritual beliefs: (Please include comment if needed)     [] Identifies with a sherron tradition:         [] Supported by a sherron community:            [] Claims no spiritual orientation:           [] Seeking spiritual identity:                [] Adheres to an individual form of spirituality:           [x] Not able to assess:                           Identified resources for coping:      [] Prayer                               [] Music                  [] Guided Imagery     [] Family/friends                 [] Pet visits     [] Devotional reading                         [x] Unknown     [] Other:                                              Interventions offered during this visit: (See comments for more details)                Plan of Care:     [] Support spiritual and/or cultural needs    [] Support AMD and/or advance care planning process      [] Support grieving process   [] Coordinate Rites and/or Rituals    [] Coordination with community clergy   [] No spiritual needs identified at this time   [] Detailed Plan of Care below (See Comments)  [] Make referral to Music Therapy  [] Make referral to Pet Therapy     [] Make referral to Addiction services  [] Make referral to [facility]  [] Make referral to Spiritual Care Partner  [] No future visits requested        [x] Follow up upon further referrals     Comments:  visit for initial spiritual assessment. Patient under Covid-19 isolation precautions. Patient resting in bed, appears comfortable.   Room darkened for restful environment. Staff providing care as needed. No family or visitors present. Please contact spiritual care for further referral or consult. Rev.  Jarett Cisneros MDiv, [facility], [facility]   paging service: [phone])

## 2021-04-04 NOTE — PROGRESS NOTES
0700- Bedside and Verbal shift change report given to Cas Cueva (oncoming nurse) by Soha Unger (offgoing nurse). Report included the following information SBAR, Kardex, ED Summary, Intake/Output, MAR, Recent Results, Cardiac Rhythm NSR and Alarm Parameters . Primary Nurse Paco Navarrete RN and Stella Obrien RN performed a dual skin assessment on this patient No impairment noted  Alf score is 20  0800- Shift assessment complete. 1000- Tele intensivist Dr. Amaya Nevarez assessed pt via tele monitor. 200- Mother called for an update. 1200- Reassessment complete. 1600- Reassessment complete. [place] Dr. Amaya Nevarez about pts HR. Orders for ABG placed and continuation of 0.4 mg precedex. 1900- Bedside and Verbal shift change report given to Soha Unger (oncoming nurse) by Cas Cueva (offgoing nurse). Report included the following information SBAR, Kardex, ED Summary, Intake/Output, MAR, Recent Results, Cardiac Rhythm SB and Alarm Parameters .

## 2021-04-04 NOTE — PROGRESS NOTES
Andrés Orellana [facility] [address]  [address] [facility], [address] [place] [address]  [phone]      Medical Progress Note      NAME: Genene Lennox   :  1990  MRM:  [id number]    Date of service: 2021  7:09 AM       Assessment and Plan:   1. Acute respiratory failure with hypoxia  - 2/2 COVID. Worsening. On high flow O2, BiPAP. Continue to monitor closely. Check CXR. 2.  Pneumonia due to COVID-19 virus (3/31/2021) -  has significantly decompensated. CTA of the chest without e/o PE. Continue solumedrol,Remdesivir started 4/3. Received Actemra and convalescence plasma. Continue vitamin C/zinc. Incentive spirometry, OOB, prone if able. Trend inflammatory markers. Pulmonary following. 3.  Thrombocytopenia ([address] (3/31/2021) - likely 2/2 viral process. Resolved     4. Asthma (3/31/2021). on Combivent, albuterol PRN. Not wheezing      5. Hyperlipidemia (3/31/2021). on statin     6. Acquired hypothyroidism (3/31/2021). on levothyroxine     7. Anxiety and depression (3/31/2021). on Ativan PRN      8. Obesity (3/31/2021) - morbid. Would benefit from weight loss. Subjective:     Chief Complaint[de-identified] Patient was seen and examined as a follow up for COVID pneumonia. Chart was reviewed. on BiPAP. C/O chest pain due to cough     ROS:  (bold if positive, if negative)    Tolerating PT  Tolerating Diet        Objective:     Last 24hrs VS reviewed since prior progress note.  Most recent are:    Visit Vitals  BP 95/61 (BP 1 Location: Left arm, BP Patient Position: At rest)   Pulse 60   Temp 97.5 °F (36.4 °C)   Resp 29   Ht 5' 3\" (1.6 m)   Wt 141.1 kg (311 lb 1.1 oz)   SpO2 93%   BMI 55.10 kg/m²     SpO2 Readings from Last 6 Encounters:   21 93%    O2 Flow Rate (L/min): 60 l/min       Intake/Output Summary (Last 24 hours) at 2021 0709  Last data filed at 4/3/2021 1130  Gross per 24 hour   Intake 499.5 ml   Output    Net 499.5 ml        Physical Exam:    Gen:  Well-developed, well-nourished, in no acute distress  HEENT:  Pink conjunctivae, PERRL, hearing intact to voice, moist mucous membranes  Neck:  Supple, without masses, thyroid non-tender  Resp:  No accessory muscle use, clear breath sounds without wheezes rales or rhonchi  Card:  No murmurs, normal S1, S2 without thrills, bruits or peripheral edema  Abd:  Soft, non-tender, non-distended, normoactive bowel sounds are present, no palpable organomegaly and no detectable hernias  Lymph:  No cervical or inguinal adenopathy  Musc:  No cyanosis or clubbing  Skin:  No rashes or ulcers, skin turgor is good  Neuro:  Cranial nerves are grossly intact, no focal motor weakness, follows commands appropriately  Psych:  Good insight, oriented to person, place and time, alert  __________________________________________________________________  Medications Reviewed: (see below)  Medications:     Current Facility-Administered Medications   Medication Dose Route Frequency    0.9% sodium chloride infusion 250 mL  250 mL IntraVENous PRN    morphine injection 2 mg  2 mg IntraVENous Q4H PRN    zolpidem (AMBIEN) tablet 5 mg  5 mg Oral QHS PRN    0.9% sodium chloride infusion 250 mL  250 mL IntraVENous PRN    LORazepam (ATIVAN) tablet 0.5 mg  0.5 mg Oral Q6H PRN    hydrOXYzine HCL (ATARAX) tablet 10 mg  10 mg Oral TID PRN    HYDROcodone-chlorpheniramine (TUSSIONEX) oral suspension 5 mL  5 mL Oral Q12H PRN    remdesivir 100 mg in 0.9% sodium chloride 250 mL IVPB  100 mg IntraVENous Q24H    albuterol (PROVENTIL HFA, VENTOLIN HFA, PROAIR HFA) inhaler 2 Puff  2 Puff Inhalation Q4H PRN    budesonide-formoteroL (SYMBICORT) 160-4.5 mcg/actuation HFA inhaler 2 Puff  2 Puff Inhalation BID RT    guaiFENesin ER (MUCINEX) tablet 1,200 mg  1,200 mg Oral BID    methylPREDNISolone (PF) (SOLU-MEDROL) injection 40 mg  40 mg IntraVENous Q6H    enoxaparin (LOVENOX) injection 60 mg  60 mg SubCUTAneous Q12H    ipratropium-albuterol (COMBIVENT RESPIMAT) 20 mcg-100 mcg inhalation spray  1 Puff Inhalation Q6HWA RT    benzonatate (TESSALON) capsule 100 mg  100 mg Oral TID PRN    guaiFENesin-dextromethorphan (ROBITUSSIN DM) 100-10 mg/5 mL syrup 5 mL  5 mL Oral Q6H PRN    morphine IR (MS IR) tablet 15 mg  15 mg Oral Q4H PRN    ipratropium-albuterol (COMBIVENT RESPIMAT) 20 mcg-100 mcg inhalation spray  1 Puff Inhalation Q4H PRN    levothyroxine (SYNTHROID) tablet 88 mcg  88 mcg Oral ACB    rosuvastatin (CRESTOR) tablet 20 mg  20 mg Oral DAILY    sodium chloride (NS) flush 5-40 mL  5-40 mL IntraVENous Q8H    sodium chloride (NS) flush 5-40 mL  5-40 mL IntraVENous PRN    acetaminophen (TYLENOL) tablet 650 mg  650 mg Oral Q6H PRN    Or    acetaminophen (TYLENOL) suppository 650 mg  650 mg Rectal Q6H PRN    polyethylene glycol (MIRALAX) packet 17 g  17 g Oral DAILY PRN    promethazine (PHENERGAN) tablet 12.5 mg  12.5 mg Oral Q6H PRN    Or    ondansetron (ZOFRAN) injection 4 mg  4 mg IntraVENous Q6H PRN    ascorbic acid (vitamin C) (VITAMIN C) tablet 500 mg  500 mg Oral DAILY    zinc gluconate tablet 50 mg  50 mg Oral DAILY        Lab Data Reviewed: (see below)  Lab Review:     Recent Labs     04/03/21  0327 04/02/21  0630   WBC 2.6* 4.7   HGB 11.1* 11.8   HCT 36.2 37.5    189     Recent Labs     04/03/21  0327 04/02/21  1714 04/02/21  0630     --  136   K 4.1  --  3.5     --  106   CO2 25  --  23   *  --  86   BUN 12  --  12   CREA 0.71  --  1.04*   CA 8.6  --  8.3*   ALB 3.0* 3.3*  --    TBILI 0.4 0.6  --    ALT 84* 87*  --      No results found for: GLUCPOC  Recent Labs     04/04/21  0355   PH 7.33*   PCO2 53*   PO2 69*   HCO3 27*   FIO2 100     No results for input(s): INR, INREXT in the last 72 hours. All Micro Results     None          I have reviewed notes of prior 24hr. Other pertinent lab: Total time spent with patient: 35 of critical time I personally reviewed chart, notes, data and current medications in the medical record.   I have personally examined and treated the patient at bedside during this period.                  Care Plan discussed with: Patient, Nursing Staff and >50% of time spent in counseling and coordination of care    Discussed:  Care Plan    Prophylaxis:  Lovenox    Disposition:  Home w/Family           ___________________________________________________    Attending Physician: Jefferson Shukla MD

## 2021-04-05 LAB
ALBUMIN SERPL-MCNC: 2.8 G/DL (ref 3.5–5)
ALBUMIN SERPL-MCNC: 3 G/DL (ref 3.5–5)
ALBUMIN/GLOB SERPL: 0.8 {RATIO} (ref 1.1–2.2)
ALBUMIN/GLOB SERPL: 0.8 {RATIO} (ref 1.1–2.2)
ALP SERPL-CCNC: 65 U/L (ref 45–117)
ALP SERPL-CCNC: 66 U/L (ref 45–117)
ALT SERPL-CCNC: 64 U/L (ref 12–78)
ALT SERPL-CCNC: 65 U/L (ref 12–78)
ANION GAP SERPL CALC-SCNC: 4 MMOL/L (ref 5–15)
ARTERIAL PATENCY WRIST A: YES
AST SERPL-CCNC: 38 U/L (ref 15–37)
AST SERPL-CCNC: 42 U/L (ref 15–37)
BACTERIA SPEC CULT: NORMAL
BASE EXCESS BLDA CALC-SCNC: 2.7 MMOL/L
BASOPHILS # BLD: 0 K/UL (ref 0–0.1)
BASOPHILS NFR BLD: 0 % (ref 0–1)
BDY SITE: ABNORMAL
BILIRUB DIRECT SERPL-MCNC: 0.1 MG/DL (ref 0–0.2)
BILIRUB SERPL-MCNC: 0.2 MG/DL (ref 0.2–1)
BILIRUB SERPL-MCNC: 0.3 MG/DL (ref 0.2–1)
BUN SERPL-MCNC: 21 MG/DL (ref 6–20)
BUN/CREAT SERPL: 30 (ref 12–20)
CALCIUM SERPL-MCNC: 8.8 MG/DL (ref 8.5–10.1)
CHLORIDE SERPL-SCNC: 110 MMOL/L (ref 97–108)
CO2 SERPL-SCNC: 30 MMOL/L (ref 21–32)
CREAT SERPL-MCNC: 0.71 MG/DL (ref 0.55–1.02)
CRP SERPL-MCNC: 5.81 MG/DL (ref 0–0.6)
D DIMER PPP FEU-MCNC: 0.6 MG/L FEU (ref 0–0.65)
DIFFERENTIAL METHOD BLD: ABNORMAL
EOSINOPHIL # BLD: 0 K/UL (ref 0–0.4)
EOSINOPHIL NFR BLD: 0 % (ref 0–7)
ERYTHROCYTE [DISTWIDTH] IN BLOOD BY AUTOMATED COUNT: 14.5 % (ref 11.5–14.5)
FERRITIN SERPL-MCNC: 548 NG/ML (ref 26–388)
FIO2 ON VENT: 60 %
GLOBULIN SER CALC-MCNC: 3.6 G/DL (ref 2–4)
GLOBULIN SER CALC-MCNC: 3.9 G/DL (ref 2–4)
GLUCOSE BLD STRIP.AUTO-MCNC: 103 MG/DL (ref 65–100)
GLUCOSE BLD STRIP.AUTO-MCNC: 133 MG/DL (ref 65–100)
GLUCOSE SERPL-MCNC: 135 MG/DL (ref 65–100)
HCO3 BLDA-SCNC: 29 MMOL/L (ref 22–26)
HCT VFR BLD AUTO: 32.6 % (ref 35–47)
HGB BLD-MCNC: 10.2 G/DL (ref 11.5–16)
IMM GRANULOCYTES # BLD AUTO: 0.1 K/UL (ref 0–0.04)
IMM GRANULOCYTES NFR BLD AUTO: 1 % (ref 0–0.5)
IPAP/PIP, IPAPIP: 20
LYMPHOCYTES # BLD: 1 K/UL (ref 0.8–3.5)
LYMPHOCYTES NFR BLD: 25 % (ref 12–49)
M TB IFN-G BLD-IMP: ABNORMAL
MAGNESIUM SERPL-MCNC: 2.7 MG/DL (ref 1.6–2.4)
MCH RBC QN AUTO: 27 PG (ref 26–34)
MCHC RBC AUTO-ENTMCNC: 31.3 G/DL (ref 30–36.5)
MCV RBC AUTO: 86.2 FL (ref 80–99)
MONOCYTES # BLD: 0.2 K/UL (ref 0–1)
MONOCYTES NFR BLD: 4 % (ref 5–13)
NEUTS SEG # BLD: 2.8 K/UL (ref 1.8–8)
NEUTS SEG NFR BLD: 70 % (ref 32–75)
NRBC # BLD: 0 K/UL (ref 0–0.01)
NRBC BLD-RTO: 0 PER 100 WBC
PCO2 BLDA: 50 MMHG (ref 35–45)
PEEP RESPIRATORY: 14 CM[H2O]
PH BLDA: 7.38 [PH] (ref 7.35–7.45)
PHOSPHATE SERPL-MCNC: 3.1 MG/DL (ref 2.6–4.7)
PLATELET # BLD AUTO: 245 K/UL (ref 150–400)
PMV BLD AUTO: 10.5 FL (ref 8.9–12.9)
PO2 BLDA: 105 MMHG (ref 80–100)
POTASSIUM SERPL-SCNC: 4 MMOL/L (ref 3.5–5.1)
PROT SERPL-MCNC: 6.4 G/DL (ref 6.4–8.2)
PROT SERPL-MCNC: 6.9 G/DL (ref 6.4–8.2)
QUANTIFERON CRITERIA, QFI1T: ABNORMAL
QUANTIFERON MITOGEN VALUE: 0.39 IU/ML
QUANTIFERON NIL VALUE: 0.05 IU/ML
QUANTIFERON TB1 AG: 0.1 IU/ML
QUANTIFERON TB2 AG: 0.13 IU/ML
RBC # BLD AUTO: 3.78 M/UL (ref 3.8–5.2)
SAO2 % BLD: 98 % (ref 92–97)
SAO2% DEVICE SAO2% SENSOR NAME: ABNORMAL
SERVICE CMNT-IMP: ABNORMAL
SERVICE CMNT-IMP: ABNORMAL
SERVICE CMNT-IMP: NORMAL
SODIUM SERPL-SCNC: 144 MMOL/L (ref 136–145)
SPECIMEN SITE: ABNORMAL
WBC # BLD AUTO: 4 K/UL (ref 3.6–11)

## 2021-04-05 PROCEDURE — 74011000250 HC RX REV CODE- 250: Performed by: INTERNAL MEDICINE

## 2021-04-05 PROCEDURE — 82962 GLUCOSE BLOOD TEST: CPT

## 2021-04-05 PROCEDURE — 74011250636 HC RX REV CODE- 250/636: Performed by: INTERNAL MEDICINE

## 2021-04-05 PROCEDURE — 86140 C-REACTIVE PROTEIN: CPT

## 2021-04-05 PROCEDURE — 80053 COMPREHEN METABOLIC PANEL: CPT

## 2021-04-05 PROCEDURE — 77010033678 HC OXYGEN DAILY

## 2021-04-05 PROCEDURE — 85379 FIBRIN DEGRADATION QUANT: CPT

## 2021-04-05 PROCEDURE — 74011000258 HC RX REV CODE- 258: Performed by: INTERNAL MEDICINE

## 2021-04-05 PROCEDURE — 74011250637 HC RX REV CODE- 250/637: Performed by: INTERNAL MEDICINE

## 2021-04-05 PROCEDURE — C9113 INJ PANTOPRAZOLE SODIUM, VIA: HCPCS | Performed by: INTERNAL MEDICINE

## 2021-04-05 PROCEDURE — 94664 DEMO&/EVAL PT USE INHALER: CPT

## 2021-04-05 PROCEDURE — 82803 BLOOD GASES ANY COMBINATION: CPT

## 2021-04-05 PROCEDURE — 36415 COLL VENOUS BLD VENIPUNCTURE: CPT

## 2021-04-05 PROCEDURE — 94660 CPAP INITIATION&MGMT: CPT

## 2021-04-05 PROCEDURE — 82728 ASSAY OF FERRITIN: CPT

## 2021-04-05 PROCEDURE — 74011000250 HC RX REV CODE- 250: Performed by: NURSE PRACTITIONER

## 2021-04-05 PROCEDURE — 84100 ASSAY OF PHOSPHORUS: CPT

## 2021-04-05 PROCEDURE — 65610000006 HC RM INTENSIVE CARE

## 2021-04-05 PROCEDURE — 36600 WITHDRAWAL OF ARTERIAL BLOOD: CPT

## 2021-04-05 PROCEDURE — 85025 COMPLETE CBC W/AUTO DIFF WBC: CPT

## 2021-04-05 PROCEDURE — 74011250636 HC RX REV CODE- 250/636: Performed by: NURSE PRACTITIONER

## 2021-04-05 PROCEDURE — 94640 AIRWAY INHALATION TREATMENT: CPT

## 2021-04-05 PROCEDURE — 83735 ASSAY OF MAGNESIUM: CPT

## 2021-04-05 PROCEDURE — 80076 HEPATIC FUNCTION PANEL: CPT

## 2021-04-05 RX ORDER — INSULIN LISPRO 100 [IU]/ML
INJECTION, SOLUTION INTRAVENOUS; SUBCUTANEOUS EVERY 6 HOURS
Status: DISCONTINUED | OUTPATIENT
Start: 2021-04-05 | End: 2021-04-11 | Stop reason: HOSPADM

## 2021-04-05 RX ORDER — DEXTROSE 50 % IN WATER (D50W) INTRAVENOUS SYRINGE
12.5-25 AS NEEDED
Status: DISCONTINUED | OUTPATIENT
Start: 2021-04-05 | End: 2021-04-11 | Stop reason: HOSPADM

## 2021-04-05 RX ORDER — MAGNESIUM SULFATE 100 %
4 CRYSTALS MISCELLANEOUS AS NEEDED
Status: DISCONTINUED | OUTPATIENT
Start: 2021-04-05 | End: 2021-04-11 | Stop reason: HOSPADM

## 2021-04-05 RX ORDER — INSULIN LISPRO 100 [IU]/ML
INJECTION, SOLUTION INTRAVENOUS; SUBCUTANEOUS EVERY 6 HOURS
Status: DISCONTINUED | OUTPATIENT
Start: 2021-04-05 | End: 2021-04-05

## 2021-04-05 RX ADMIN — BUDESONIDE AND FORMOTEROL FUMARATE DIHYDRATE 2 PUFF: 160; 4.5 AEROSOL RESPIRATORY (INHALATION) at 20:51

## 2021-04-05 RX ADMIN — IPRATROPIUM BROMIDE AND ALBUTEROL SULFATE 3 ML: .5; 2.5 SOLUTION RESPIRATORY (INHALATION) at 08:00

## 2021-04-05 RX ADMIN — Medication 10 ML: at 13:01

## 2021-04-05 RX ADMIN — ENOXAPARIN SODIUM 60 MG: 60 INJECTION SUBCUTANEOUS at 21:21

## 2021-04-05 RX ADMIN — DEXMEDETOMIDINE HYDROCHLORIDE 0.4 MCG/KG/HR: 400 INJECTION INTRAVENOUS at 02:20

## 2021-04-05 RX ADMIN — CEFTRIAXONE 2 G: 2 INJECTION, POWDER, FOR SOLUTION INTRAMUSCULAR; INTRAVENOUS at 12:41

## 2021-04-05 RX ADMIN — Medication 10 ML: at 05:07

## 2021-04-05 RX ADMIN — DEXAMETHASONE SODIUM PHOSPHATE 6 MG: 4 INJECTION, SOLUTION INTRAMUSCULAR; INTRAVENOUS at 12:43

## 2021-04-05 RX ADMIN — PANTOPRAZOLE SODIUM 40 MG: 40 INJECTION, POWDER, FOR SOLUTION INTRAVENOUS at 12:41

## 2021-04-05 RX ADMIN — IPRATROPIUM BROMIDE AND ALBUTEROL SULFATE 3 ML: .5; 2.5 SOLUTION RESPIRATORY (INHALATION) at 13:58

## 2021-04-05 RX ADMIN — AZITHROMYCIN MONOHYDRATE 500 MG: 500 INJECTION, POWDER, LYOPHILIZED, FOR SOLUTION INTRAVENOUS at 12:42

## 2021-04-05 RX ADMIN — LORAZEPAM 0.5 MG: 2 INJECTION INTRAMUSCULAR; INTRAVENOUS at 02:20

## 2021-04-05 RX ADMIN — IPRATROPIUM BROMIDE AND ALBUTEROL 1 PUFF: 20; 100 SPRAY, METERED RESPIRATORY (INHALATION) at 20:48

## 2021-04-05 RX ADMIN — ENOXAPARIN SODIUM 60 MG: 60 INJECTION SUBCUTANEOUS at 08:07

## 2021-04-05 RX ADMIN — REMDESIVIR 100 MG: 100 INJECTION, POWDER, LYOPHILIZED, FOR SOLUTION INTRAVENOUS at 05:07

## 2021-04-05 RX ADMIN — GUAIFENESIN 1200 MG: 600 TABLET, EXTENDED RELEASE ORAL at 18:11

## 2021-04-05 RX ADMIN — ARFORMOTEROL TARTRATE: 15 SOLUTION RESPIRATORY (INHALATION) at 08:09

## 2021-04-05 RX ADMIN — Medication 10 ML: at 21:21

## 2021-04-05 NOTE — PROGRESS NOTES
CRITICAL CARE  Progress Note  Date:2021       Room:[address] [place] [address]  Patient Lili Terrazas     YOB: 1990     Age:30 y.o. Subjective    Subjective required Precedex throughout the night but recently stopped given improved symptoms and secondary to bradycardia; remains on noninvasive positive pressure ventilation with increased IPAP and EPAP  Review of Systems unable to obtain secondary to clinical condition (tachypneic on BiPAP); she denied fever, chills, chest pain  Objective         Vitals Last 24 Hours:  TEMPERATURE:  Temp  Av °F (36.7 °C)  Min: 97.3 °F (36.3 °C)  Max: 98.9 °F (37.2 °C)  RESPIRATIONS RANGE: Resp  Av.9  Min: 15  Max: 36  PULSE OXIMETRY RANGE: SpO2  Av.4 %  Min: 95 %  Max: 100 %  PULSE RANGE: Pulse  Av.1  Min: 40  Max: 98  BLOOD PRESSURE RANGE: Systolic (30HYT), ZNK:448 , Min:80 , QIZ:020   ; Diastolic (84JRZ), TEP:32, Min:56, Max:90    I/O (24Hr):     Intake/Output Summary (Last 24 hours) at 2021 0956  Last data filed at 2021 0500  Gross per 24 hour   Intake 499.67 ml   Output 1150 ml   Net -650.33 ml       Exam facilitated by use of telemedicine platform with A/V and with the assistance of in-house staff  General-patient awake and interactive; initially tachypneic but towards the end of the examination improved work of breathing and rate; appears older and taller than stated age and height, respectively  Head-normocephalic atraumatic; no skin breakdown around the BiPAP mask  Eyes-anicteric sclera; extraocular is intact  ENT-normal external anatomy were visible  Cardiovascular-sinus rhythm in the 60s with no evidence of hypoperfusion by external examination; acceptable blood pressures  Pulmonary-improved tachypnea normal work of breathing; no audible leak from around the bypass mask ( at current settings)  Abdomen-obese but benign  Musculoskeletal-no deformities  Skin-intact anteriorly were visible  Extremities-no profound edema noted  Neuro-no fasciculation, tremor; moves extremities without deficits  Objective  Labs/Imaging/Diagnostics    Labs:  CBC:  Recent Labs     04/05/21  0505 04/04/21  0538 04/03/21  0327   WBC 4.0 3.7 2.6*   RBC 3.78* 4.06 4.16   HGB 10.2* 10.7* 11.1*   HCT 32.6* 35.1 36.2   MCV 86.2 86.5 87.0   RDW 14.5 14.5 14.3    191 156       __________________________________________________________________  Medications Reviewed: (see below)  Medications:             Current Facility-Administered Medications   Medication Dose Route Frequency    albuterol (PROVENTIL HFA, VENTOLIN HFA, PROAIR HFA) inhaler 2 Puff  2 Puff Inhalation Q4H PRN     Or    albuterol (PROVENTIL VENTOLIN) nebulizer solution 2.5 mg  2.5 mg Nebulization Q4H PRN    budesonide-formoteroL (SYMBICORT) 160-4.5 mcg/actuation HFA inhaler 2 Puff  2 Puff Inhalation BID RT     Or    arformoterol 15 mcg/budesonide 0.5 mg neb solution   Nebulization BID RT    ipratropium-albuterol (COMBIVENT RESPIMAT) 20 mcg-100 mcg inhalation spray  1 Puff Inhalation Q6HWA RT     Or    albuterol-ipratropium (DUO-NEB) 2.5 MG-0.5 MG/3 ML  3 mL Nebulization Q6HWA RT    ipratropium-albuterol (COMBIVENT RESPIMAT) 20 mcg-100 mcg inhalation spray  1 Puff Inhalation Q4H PRN     Or    albuterol-ipratropium (DUO-NEB) 2.5 MG-0.5 MG/3 ML  3 mL Nebulization Q4H PRN    [START ON 4/11/2021] levothyroxine (SYNTHROID) injection 65 mcg  65 mcg IntraVENous Q24H    LORazepam (ATIVAN) injection 0.5 mg  0.5 mg IntraVENous Q6H PRN    dexmedeTOMidine in 0.9 % NaCl (PRECEDEX) 400 mcg/100 mL (4 mcg/mL) infusion soln  0.1-1.5 mcg/kg/hr IntraVENous TITRATE    azithromycin (ZITHROMAX) 500 mg in 0.9% sodium chloride 250 mL (VIAL-MATE)  500 mg IntraVENous Q24H    cefTRIAXone (ROCEPHIN) 2 g in sterile water (preservative free) 20 mL IV syringe  2 g IntraVENous Q24H    pantoprazole (PROTONIX) 40 mg in 0.9% sodium chloride 10 mL injection  40 mg IntraVENous Q24H    dexamethasone (DECADRON) 4 mg/mL injection 6 mg  6 mg IntraVENous Q24H    0.9% sodium chloride infusion 250 mL  250 mL IntraVENous PRN    morphine injection 2 mg  2 mg IntraVENous Q4H PRN    zolpidem (AMBIEN) tablet 5 mg  5 mg Oral QHS PRN    0.9% sodium chloride infusion 250 mL  250 mL IntraVENous PRN    hydrOXYzine HCL (ATARAX) tablet 10 mg  10 mg Oral TID PRN    HYDROcodone-chlorpheniramine (TUSSIONEX) oral suspension 5 mL  5 mL Oral Q12H PRN    remdesivir 100 mg in 0.9% sodium chloride 250 mL IVPB  100 mg IntraVENous Q24H    guaiFENesin ER (MUCINEX) tablet 1,200 mg  1,200 mg Oral BID    enoxaparin (LOVENOX) injection 60 mg  60 mg SubCUTAneous Q12H    benzonatate (TESSALON) capsule 100 mg  100 mg Oral TID PRN    guaiFENesin-dextromethorphan (ROBITUSSIN DM) 100-10 mg/5 mL syrup 5 mL  5 mL Oral Q6H PRN    morphine IR (MS IR) tablet 15 mg  15 mg Oral Q4H PRN    rosuvastatin (CRESTOR) tablet 20 mg  20 mg Oral DAILY    sodium chloride (NS) flush 5-40 mL  5-40 mL IntraVENous Q8H    sodium chloride (NS) flush 5-40 mL  5-40 mL IntraVENous PRN    acetaminophen (TYLENOL) tablet 650 mg  650 mg Oral Q6H PRN     Or    acetaminophen (TYLENOL) suppository 650 mg  650 mg Rectal Q6H PRN    polyethylene glycol (MIRALAX) packet 17 g  17 g Oral DAILY PRN    promethazine (PHENERGAN) tablet 12.5 mg  12.5 mg Oral Q6H PRN     Or    ondansetron (ZOFRAN) injection 4 mg  4 mg IntraVENous Q6H PRN    ascorbic acid (vitamin C) (VITAMIN C) tablet 500 mg  500 mg Oral DAILY    zinc gluconate tablet 50 mg  50 mg Oral DAILY        CHEMISTRIES:  Recent Labs     04/05/21  0505 04/04/21  0538 04/03/21  0327    141 139   K 4.0 4.7 4.1   * 107 108   CO2 30 28 25   BUN 21* 15 12   CA 8.8 8.6 8.6   PHOS 3.1  --   --    MG 2.7*  --   --    PT/INR:No results for input(s): INR, INREXT in the last 72 hours. No lab exists for component: PROTIME  APTT:No results for input(s): APTT in the last 72 hours.   LIVER PROFILE:  Recent Labs     04/05/21  0505 04/04/21  6379 04/03/21  0327   AST 38*  42* 52*  50* 64*   ALT 64  65 71  70 84*     Lab Results   Component Value Date/Time    ALT (SGPT) 65 04/05/2021 05:05 AM    ALT (SGPT) 64 04/05/2021 05:05 AM    AST (SGOT) 42 (H) 04/05/2021 05:05 AM    AST (SGOT) 38 (H) 04/05/2021 05:05 AM    Alk. phosphatase 65 04/05/2021 05:05 AM    Alk. phosphatase 66 04/05/2021 05:05 AM    Bilirubin, direct 0.1 04/05/2021 05:05 AM    Bilirubin, total 0.3 04/05/2021 05:05 AM    Bilirubin, total 0.2 04/05/2021 05:05 AM       Imaging Last 24 Hours:  Xr Chest Port    Result Date: 4/4/2021  EXAM: XR CHEST PORT INDICATION: O2 desaturation COMPARISON: 3/31/2021 FINDINGS: A portable AP radiograph of the chest was obtained at 10:00 hours. The patient is on a cardiac monitor. Significant increased interstitial and airspace opacities are noted bilaterally. . The cardiac and mediastinal contours and pulmonary vascularity are normal.  The bones and soft tissues are grossly within normal limits. Significant increase interstitial and airspace opacities bilaterally    Duplex Lower Ext Venous Bilat    Result Date: 4/5/2021  Bilateral lower extremity venous duplex limited negative for deep venous thrombosis or thrombophlebitis in the veins visualized. Limited study due to pts body habitus and inability to recline in bed. Additionally limited right thigh visibility due to cath placement.      Assessment//Plan   Principal Problem:    Pneumonia due to COVID-19 virus (3/31/2021)    Active Problems:    Hypokalemia (3/31/2021)      Thrombocytopenia ([address] (3/31/2021)      Obesity (3/31/2021)      Asthma (3/31/2021)      Hyperlipidemia (3/31/2021)      Acquired hypothyroidism (3/31/2021)      Anxiety and depression (3/31/2021)      COVID-19 (4/1/2021)      Acute respiratory failure ([address] (4/3/2021)      Assessment & Plan     Neuro-improved anxiety and is off the Precedex drip; would verify that patient is not prescribed or exposed to nonprescription psychiatric medications at baseline as she has limited pulmonary reserve and would not want anxiety/agitation contributing to her respiratory decompensation  -She denied alcohol or illicit drug use    Cardiovascular-I suspect that the bradycardia was directly related to the Precedex drip and her heart rate has improved with cessation there of  -No evidence of hypoperfusion acceptable mean arterial pressures at this time    Pulmonary-acute respiratory failure with hypoxia likely multifactorial with the predominant culprit being infection from Covid but occurring in the setting of obesity and possible AVINASH/OHS; she is adequate oxygenation and work of breathing on current BiPAP settings but she has required escalation of support  -Her PCO2 is elevated on the arterial blood gas but this is associated with a normal pH as such there is likely a component of chronicity and as such, no immediate need to increase the delta I/EPAP  -Patient has received potentially nonspecific agents for her Covid infection and remains at high risk of further decompensation; in the event of intubation, her ideal body weight is 52 kg (assuming the height of 160 cm is accurate) and 8, 7, 6 mL/kilogram translates to 420, 360, and 310; given her habitus and presumed atelectasis she would likely require a higher initial PEEP so potential settings would be a respiratory rate 16-22 with a goal tidal volume of 7.5 mL/kilogram FiO2 would likely be 80% or higher initial PEEP of 8-10; obviously these settings would need to be adjusted depending on waveforms, airway pressures, intolerance    GI-n.p.o. unless respiratory status permits safe eating; no acute need for peptic ulcer disease prophylaxis but if on aging at baseline would continue    -patient is at moderate risk for acute kidney injury secondary to her hypoxic respiratory failure from Covid    Heme-no evidence of hemorrhage and tolerating Lovenox (if encounters acute kidney injury will need to modify)    ID-status post agents as above for Covid; also receiving community-acquired pneumonia coverage but may be able to de-escalate/discontinue-we will discuss on MDR    Endocrine-as critically ill and receiving steroids reasonable to start Accu-Cheks with reflex to schedule regimen if needed  -Reportedly has hypothyroidism at baseline    Critical care time excluding teaching procedures 40 minutes    I performed all aspects of the physical examination via Telemedicine associated with two way audio and video communication and with the on-site assistance of the bedside nurse. I am located in [place] and the patient is located in [facility]   The patient is critically ill in the ICU. I  personally  reviewed the pertinent medical records, laboratory/ pathology data and radiographic images. The decision making regarding this patient is as documented above, which was generated  following  discussion  with the multidisciplinary ICU team and creation of a treatment plan for  the patient. We discussed the patient's interval history and future coordination of care and  plans. The patient's medications  were reviewed and changes made as stipulated above. Due to  critical illness impairing one or more vital organs of this patient resulting in life threatening clinical situation  I have provided direct, frequent personal  assessment and manipulation in management plan.     Electronically signed by Sandie Tom MD on 4/5/2021 at 9:56 AM

## 2021-04-05 NOTE — PROGRESS NOTES
1900: Bedside shift change report given to Irlanda Payne RN (oncoming nurse) by Kaylee Erwin and Nena Blank RN  (offgoing nurse). Report included the following information SBAR, Kardex, Procedure Summary, Intake/Output, MAR, Recent Results and Cardiac Rhythm NSR/SB. Primary Nurse aSm Daily, ONELIA and Jared Ornelas RN performed a dual skin assessment on this patient No impairment noted  Alf score is 15    2000: Shift  Assessment completed            Mental Status:  A/O x4, RAAS -1            Respiratory: Lungs diminished, on BiPAP            Cardiac: S1S2, NSR/SB, pulses present/palp            GI/: Carrasco intact/draining, carrasco care provided; Bowel sounds present x4            IV Drips: 22 RH, 20RAC; Precedex 0.4    2100: Pt c/o chest pain, PRN Morphine given with. ABG results provided to intensivist.     2200: Pt resting quietly, visible improvement of pain. 2300: Mother updated on the phone. 0000: Reassessment completed. No changes from previous assessment    0200: Pt c/o increased anxiety, PRN Ativan given    0300: Pt resting quietly    0400: Reassessment completed. No changes from previous assessment    0500: Labs drawn, low urine output noted, bladder scan negative for retention. Carrasco care provided      0700: Bedside shift change report given to Eva Kohler (oncoming nurse) by Irlanda Payne RN (offgoing nurse).  Report included the following information SBAR, Kardex, Procedure Summary, Intake/Output, MAR, Recent Results and Cardiac Rhythm SB.

## 2021-04-05 NOTE — PROGRESS NOTES
ABG obtained   PH-7.37  CO2-50.3  PO2-105.2  HCO3-29  BE-2.7    Patient is currently is on 20/14, 60%. Will wean oxygen as tolerated.

## 2021-04-05 NOTE — PROGRESS NOTES
[facility]  380 [facility], [address] [place] [address]  [phone]      Medical Progress Note      NAME: Mike Lieberman   :  1990  MRM:  [id number]    Date of service: 2021  7:09 AM       Assessment and Plan:   1. Acute respiratory failure with hypoxia  - 2/2 COVID. Worsening. On high flow O2, BiPAP. Continue to monitor closely. Check CXR. 2.  Pneumonia due to COVID-19 virus (3/31/2021) -  has significantly decompensated. CTA of the chest without e/o PE. Continue solumedrol,Remdesivir started 4/3. Received Actemra and convalescence plasma. Continue vitamin C/zinc. Incentive spirometry, OOB, prone if able. Trend inflammatory markers. intensivist  following. 3.  Thrombocytopenia ([address] (3/31/2021) - likely 2/2 viral process. Resolved     4. Asthma (3/31/2021). on Combivent, albuterol PRN. Not wheezing      5. Hyperlipidemia (3/31/2021). on statin     6. Acquired hypothyroidism (3/31/2021). on levothyroxine     7. Anxiety and depression (3/31/2021). on Ativan PRN      8. Obesity (3/31/2021) - morbid. Would benefit from weight loss. 9.  Bradycardia. Likely due to precedex. Try to wean off. Pt can get ativan PRN for anxiety. Updated her mother. Subjective:     Chief Complaint[de-identified] Patient was seen and examined as a follow up for COVID pneumonia. Chart was reviewed. on BiPAP. C/O  Sob and cough. Looks comfortable on BiPAP, not in distress. ROS:  (bold if positive, if negative)    Tolerating PT  Tolerating Diet        Objective:     Last 24hrs VS reviewed since prior progress note.  Most recent are:    Visit Vitals  /72   Pulse (!) 41   Temp 98 °F (36.7 °C)   Resp 27   Ht 5' 3\" (1.6 m)   Wt 111.3 kg (245 lb 6 oz)   SpO2 99%   BMI 43.47 kg/m²     SpO2 Readings from Last 6 Encounters:   21 99%    O2 Flow Rate (L/min): 60 l/min       Intake/Output Summary (Last 24 hours) at 2021 8307  Last data filed at 2021 0500  Gross per 24 hour   Intake 749.67 ml   Output 1150 ml   Net -400.33 ml        Physical Exam:    Gen:  Well-developed, well-nourished, in no acute distress  HEENT:  Pink conjunctivae, PERRL, hearing intact to voice, moist mucous membranes  Neck:  Supple, without masses, thyroid non-tender  Resp:  No accessory muscle use, clear breath sounds without wheezes rales or rhonchi  Card:  No murmurs, normal S1, S2 without thrills, bruits or peripheral edema  Abd:  Soft, non-tender, non-distended, normoactive bowel sounds are present, no palpable organomegaly and no detectable hernias  Lymph:  No cervical or inguinal adenopathy  Musc:  No cyanosis or clubbing  Skin:  No rashes or ulcers, skin turgor is good  Neuro:  Cranial nerves are grossly intact, no focal motor weakness, follows commands appropriately  Psych:  Good insight, oriented to person, place and time, alert  __________________________________________________________________  Medications Reviewed: (see below)  Medications:     Current Facility-Administered Medications   Medication Dose Route Frequency    albuterol (PROVENTIL HFA, VENTOLIN HFA, PROAIR HFA) inhaler 2 Puff  2 Puff Inhalation Q4H PRN    Or    albuterol (PROVENTIL VENTOLIN) nebulizer solution 2.5 mg  2.5 mg Nebulization Q4H PRN    budesonide-formoteroL (SYMBICORT) 160-4.5 mcg/actuation HFA inhaler 2 Puff  2 Puff Inhalation BID RT    Or    arformoterol 15 mcg/budesonide 0.5 mg neb solution   Nebulization BID RT    ipratropium-albuterol (COMBIVENT RESPIMAT) 20 mcg-100 mcg inhalation spray  1 Puff Inhalation Q6HWA RT    Or    albuterol-ipratropium (DUO-NEB) 2.5 MG-0.5 MG/3 ML  3 mL Nebulization Q6HWA RT    ipratropium-albuterol (COMBIVENT RESPIMAT) 20 mcg-100 mcg inhalation spray  1 Puff Inhalation Q4H PRN    Or    albuterol-ipratropium (DUO-NEB) 2.5 MG-0.5 MG/3 ML  3 mL Nebulization Q4H PRN    [START ON 4/11/2021] levothyroxine (SYNTHROID) injection 65 mcg  65 mcg IntraVENous Q24H    LORazepam (ATIVAN) injection 0.5 mg  0.5 mg IntraVENous Q6H PRN    dexmedeTOMidine in 0.9 % NaCl (PRECEDEX) 400 mcg/100 mL (4 mcg/mL) infusion soln  0.1-1.5 mcg/kg/hr IntraVENous TITRATE    azithromycin (ZITHROMAX) 500 mg in 0.9% sodium chloride 250 mL (VIAL-MATE)  500 mg IntraVENous Q24H    cefTRIAXone (ROCEPHIN) 2 g in sterile water (preservative free) 20 mL IV syringe  2 g IntraVENous Q24H    pantoprazole (PROTONIX) 40 mg in 0.9% sodium chloride 10 mL injection  40 mg IntraVENous Q24H    dexamethasone (DECADRON) 4 mg/mL injection 6 mg  6 mg IntraVENous Q24H    0.9% sodium chloride infusion 250 mL  250 mL IntraVENous PRN    morphine injection 2 mg  2 mg IntraVENous Q4H PRN    zolpidem (AMBIEN) tablet 5 mg  5 mg Oral QHS PRN    0.9% sodium chloride infusion 250 mL  250 mL IntraVENous PRN    hydrOXYzine HCL (ATARAX) tablet 10 mg  10 mg Oral TID PRN    HYDROcodone-chlorpheniramine (TUSSIONEX) oral suspension 5 mL  5 mL Oral Q12H PRN    remdesivir 100 mg in 0.9% sodium chloride 250 mL IVPB  100 mg IntraVENous Q24H    guaiFENesin ER (MUCINEX) tablet 1,200 mg  1,200 mg Oral BID    enoxaparin (LOVENOX) injection 60 mg  60 mg SubCUTAneous Q12H    benzonatate (TESSALON) capsule 100 mg  100 mg Oral TID PRN    guaiFENesin-dextromethorphan (ROBITUSSIN DM) 100-10 mg/5 mL syrup 5 mL  5 mL Oral Q6H PRN    morphine IR (MS IR) tablet 15 mg  15 mg Oral Q4H PRN    rosuvastatin (CRESTOR) tablet 20 mg  20 mg Oral DAILY    sodium chloride (NS) flush 5-40 mL  5-40 mL IntraVENous Q8H    sodium chloride (NS) flush 5-40 mL  5-40 mL IntraVENous PRN    acetaminophen (TYLENOL) tablet 650 mg  650 mg Oral Q6H PRN    Or    acetaminophen (TYLENOL) suppository 650 mg  650 mg Rectal Q6H PRN    polyethylene glycol (MIRALAX) packet 17 g  17 g Oral DAILY PRN    promethazine (PHENERGAN) tablet 12.5 mg  12.5 mg Oral Q6H PRN    Or    ondansetron (ZOFRAN) injection 4 mg  4 mg IntraVENous Q6H PRN    ascorbic acid (vitamin C) (VITAMIN C) tablet 500 mg  500 mg Oral DAILY    zinc gluconate tablet 50 mg  50 mg Oral DAILY        Lab Data Reviewed: (see below)  Lab Review:     Recent Labs     04/05/21  0505 04/04/21  0538 04/03/21  0327   WBC 4.0 3.7 2.6*   HGB 10.2* 10.7* 11.1*   HCT 32.6* 35.1 36.2    191 156     Recent Labs     04/05/21  0505 04/04/21  0538 04/03/21  0327    141 139   K 4.0 4.7 4.1   * 107 108   CO2 30 28 25   * 121* 126*   BUN 21* 15 12   CREA 0.71 0.71 0.71   CA 8.8 8.6 8.6   MG 2.7*  --   --    PHOS 3.1  --   --    ALB 3.0*  2.8* 2.9*  2.8* 3.0*   TBILI 0.2  0.3 0.4  0.4 0.4   ALT 64  65 71  70 84*     No results found for: GLUCPOC  Recent Labs     04/04/21 2024 04/04/21  0355   PH 7.37 7.33*   PCO2 49* 53*   PO2 127* 69*   HCO3 27* 27*   FIO2 80 100     No results for input(s): INR, INREXT, INREXT in the last 72 hours. All Micro Results     Procedure Component Value Units Date/Time    CULTURE, URINE [[id number]] Collected: 04/04/21 1528    Order Status: Completed Specimen: Cath Urine Updated: 04/04/21 2824          I have reviewed notes of prior 24hr. Other pertinent lab: Total time spent with patient: 35 of critical time I personally reviewed chart, notes, data and current medications in the medical record. I have personally examined and treated the patient at bedside during this period.                  Care Plan discussed with: Patient, Nursing Staff and >50% of time spent in counseling and coordination of care    Discussed:  Care Plan    Prophylaxis:  Lovenox    Disposition:  Home w/Family           ___________________________________________________    Attending Physician: Thomas Paredes MD

## 2021-04-05 NOTE — PROGRESS NOTES
BRANDY note:    RUR 20%    Pt is Covid 19 +    Pt is on high flow oxygen (40L/75% Fio2)    Pt confirms address being in [address] [place] [address]. She states she has beenin [place] with her son, Mom and Dad as her grandmother   . Family has   Clearing out  Grandmother's home  and staying there. Pt and pt's family are not able to return to [place] until pt is COVID-19 negative and not symptomatic. Pt states her parents are now vaccinated and she is the only one who is sick. Pt wishes to return home with family at time of discharge    Plan:  Precedex gtt  Iv dexamethasone  Vitamin C,zinc  Lovenox for DVT prophylaxis  Iv remdesivir  Iv protonix GI prophylaxis  IV rocephin and iv azithromycin  Following pt for potential home oxygen and other needs.     Prashant Phoenix  Case management  [phone]

## 2021-04-05 NOTE — PROGRESS NOTES
Spiritual Care Assessment/Progress Note  [address]      NAME: Jumana Ortiz      MRN: [id number]  AGE: 27 y.o. SEX: female  Congregation Affiliation: Unknown   Language: English     4/5/2021     Total Time (in minutes): 5     Spiritual Assessment begun in [facility] INTENSIVE CARE through conversation with:         []Patient        [] Family    [] Friend(s)        Reason for Consult: Initial/Spiritual assessment, critical care     Spiritual beliefs: (Please include comment if needed)     [] Identifies with a sherron tradition:         [] Supported by a sherron community:            [] Claims no spiritual orientation:           [] Seeking spiritual identity:                [] Adheres to an individual form of spirituality:           [x] Not able to assess:                           Identified resources for coping:      [] Prayer                               [] Music                  [] Guided Imagery     [] Family/friends                 [] Pet visits     [] Devotional reading                         [x] Unknown     [] Other:                                               Interventions offered during this visit: (See comments for more details)                Plan of Care:     [] Support spiritual and/or cultural needs    [] Support AMD and/or advance care planning process      [] Support grieving process   [] Coordinate Rites and/or Rituals    [] Coordination with community clergy   [] No spiritual needs identified at this time   [] Detailed Plan of Care below (See Comments)  [] Make referral to Music Therapy  [] Make referral to Pet Therapy     [] Make referral to Addiction services  [] Make referral to [facility] [place] [facility]  [] Make referral to Spiritual Care Partner  [] No future visits requested        [x] Follow up upon further referrals     Comments:  visit for initial spiritual assessment. Staff with patient providing care at the time of this visit. Curtain pulled for privacy.   Please contact spiritual care for further referral or consult. Rev.  Mainor Dominguez MDiv, [facility], [facility]   paging service: [phone])

## 2021-04-06 LAB
ALBUMIN SERPL-MCNC: 3.1 G/DL (ref 3.5–5)
ALBUMIN/GLOB SERPL: 0.8 {RATIO} (ref 1.1–2.2)
ALP SERPL-CCNC: 67 U/L (ref 45–117)
ALT SERPL-CCNC: 85 U/L (ref 12–78)
ANION GAP SERPL CALC-SCNC: 5 MMOL/L (ref 5–15)
AST SERPL-CCNC: 64 U/L (ref 15–37)
BASOPHILS # BLD: 0 K/UL (ref 0–0.1)
BASOPHILS NFR BLD: 1 % (ref 0–1)
BILIRUB DIRECT SERPL-MCNC: 0.1 MG/DL (ref 0–0.2)
BILIRUB SERPL-MCNC: 0.4 MG/DL (ref 0.2–1)
BUN SERPL-MCNC: 18 MG/DL (ref 6–20)
BUN/CREAT SERPL: 25 (ref 12–20)
CALCIUM SERPL-MCNC: 8.2 MG/DL (ref 8.5–10.1)
CHLORIDE SERPL-SCNC: 106 MMOL/L (ref 97–108)
CO2 SERPL-SCNC: 30 MMOL/L (ref 21–32)
CREAT SERPL-MCNC: 0.72 MG/DL (ref 0.55–1.02)
CRP SERPL-MCNC: 3.1 MG/DL (ref 0–0.6)
D DIMER PPP FEU-MCNC: 1.15 MG/L FEU (ref 0–0.65)
DIFFERENTIAL METHOD BLD: ABNORMAL
EOSINOPHIL # BLD: 0 K/UL (ref 0–0.4)
EOSINOPHIL NFR BLD: 0 % (ref 0–7)
ERYTHROCYTE [DISTWIDTH] IN BLOOD BY AUTOMATED COUNT: 13.8 % (ref 11.5–14.5)
FERRITIN SERPL-MCNC: 387 NG/ML (ref 26–388)
GLOBULIN SER CALC-MCNC: 4.1 G/DL (ref 2–4)
GLUCOSE BLD STRIP.AUTO-MCNC: 101 MG/DL (ref 65–100)
GLUCOSE BLD STRIP.AUTO-MCNC: 104 MG/DL (ref 65–100)
GLUCOSE BLD STRIP.AUTO-MCNC: 81 MG/DL (ref 65–100)
GLUCOSE BLD STRIP.AUTO-MCNC: 82 MG/DL (ref 65–100)
GLUCOSE BLD STRIP.AUTO-MCNC: 87 MG/DL (ref 65–100)
GLUCOSE SERPL-MCNC: 85 MG/DL (ref 65–100)
HCT VFR BLD AUTO: 37.6 % (ref 35–47)
HGB BLD-MCNC: 11.5 G/DL (ref 11.5–16)
IMM GRANULOCYTES # BLD AUTO: 0.1 K/UL (ref 0–0.04)
IMM GRANULOCYTES NFR BLD AUTO: 3 % (ref 0–0.5)
LYMPHOCYTES # BLD: 1 K/UL (ref 0.8–3.5)
LYMPHOCYTES NFR BLD: 23 % (ref 12–49)
MCH RBC QN AUTO: 26.8 PG (ref 26–34)
MCHC RBC AUTO-ENTMCNC: 30.6 G/DL (ref 30–36.5)
MCV RBC AUTO: 87.6 FL (ref 80–99)
MONOCYTES # BLD: 0.3 K/UL (ref 0–1)
MONOCYTES NFR BLD: 6 % (ref 5–13)
NEUTS SEG # BLD: 3 K/UL (ref 1.8–8)
NEUTS SEG NFR BLD: 67 % (ref 32–75)
NRBC # BLD: 0 K/UL (ref 0–0.01)
NRBC BLD-RTO: 0 PER 100 WBC
PLATELET # BLD AUTO: 281 K/UL (ref 150–400)
PMV BLD AUTO: 11.2 FL (ref 8.9–12.9)
POTASSIUM SERPL-SCNC: 3.7 MMOL/L (ref 3.5–5.1)
PROT SERPL-MCNC: 7.2 G/DL (ref 6.4–8.2)
RBC # BLD AUTO: 4.29 M/UL (ref 3.8–5.2)
RBC MORPH BLD: ABNORMAL
SERVICE CMNT-IMP: ABNORMAL
SERVICE CMNT-IMP: ABNORMAL
SERVICE CMNT-IMP: NORMAL
SODIUM SERPL-SCNC: 141 MMOL/L (ref 136–145)
WBC # BLD AUTO: 4.4 K/UL (ref 3.6–11)
WBC MORPH BLD: ABNORMAL

## 2021-04-06 PROCEDURE — 94660 CPAP INITIATION&MGMT: CPT

## 2021-04-06 PROCEDURE — 74011000250 HC RX REV CODE- 250: Performed by: INTERNAL MEDICINE

## 2021-04-06 PROCEDURE — 94664 DEMO&/EVAL PT USE INHALER: CPT

## 2021-04-06 PROCEDURE — 36415 COLL VENOUS BLD VENIPUNCTURE: CPT

## 2021-04-06 PROCEDURE — 74011250636 HC RX REV CODE- 250/636: Performed by: INTERNAL MEDICINE

## 2021-04-06 PROCEDURE — 85025 COMPLETE CBC W/AUTO DIFF WBC: CPT

## 2021-04-06 PROCEDURE — 65610000006 HC RM INTENSIVE CARE

## 2021-04-06 PROCEDURE — 85379 FIBRIN DEGRADATION QUANT: CPT

## 2021-04-06 PROCEDURE — C9113 INJ PANTOPRAZOLE SODIUM, VIA: HCPCS | Performed by: INTERNAL MEDICINE

## 2021-04-06 PROCEDURE — 82248 BILIRUBIN DIRECT: CPT

## 2021-04-06 PROCEDURE — 77010033711 HC HIGH FLOW OXYGEN

## 2021-04-06 PROCEDURE — 94760 N-INVAS EAR/PLS OXIMETRY 1: CPT

## 2021-04-06 PROCEDURE — 94640 AIRWAY INHALATION TREATMENT: CPT

## 2021-04-06 PROCEDURE — 74011250637 HC RX REV CODE- 250/637: Performed by: INTERNAL MEDICINE

## 2021-04-06 PROCEDURE — 80053 COMPREHEN METABOLIC PANEL: CPT

## 2021-04-06 PROCEDURE — 86140 C-REACTIVE PROTEIN: CPT

## 2021-04-06 PROCEDURE — 74011250636 HC RX REV CODE- 250/636: Performed by: NURSE PRACTITIONER

## 2021-04-06 PROCEDURE — 82728 ASSAY OF FERRITIN: CPT

## 2021-04-06 PROCEDURE — 74011000258 HC RX REV CODE- 258: Performed by: INTERNAL MEDICINE

## 2021-04-06 PROCEDURE — 74011000250 HC RX REV CODE- 250: Performed by: NURSE PRACTITIONER

## 2021-04-06 PROCEDURE — 82962 GLUCOSE BLOOD TEST: CPT

## 2021-04-06 RX ORDER — LEVOTHYROXINE SODIUM 88 UG/1
88 TABLET ORAL
Status: DISCONTINUED | OUTPATIENT
Start: 2021-04-06 | End: 2021-04-11 | Stop reason: HOSPADM

## 2021-04-06 RX ADMIN — BUDESONIDE AND FORMOTEROL FUMARATE DIHYDRATE 2 PUFF: 160; 4.5 AEROSOL RESPIRATORY (INHALATION) at 08:25

## 2021-04-06 RX ADMIN — Medication 10 ML: at 06:11

## 2021-04-06 RX ADMIN — CEFTRIAXONE 2 G: 2 INJECTION, POWDER, FOR SOLUTION INTRAMUSCULAR; INTRAVENOUS at 12:15

## 2021-04-06 RX ADMIN — BUDESONIDE AND FORMOTEROL FUMARATE DIHYDRATE 2 PUFF: 160; 4.5 AEROSOL RESPIRATORY (INHALATION) at 20:56

## 2021-04-06 RX ADMIN — Medication 20 ML: at 22:21

## 2021-04-06 RX ADMIN — GUAIFENESIN 1200 MG: 600 TABLET, EXTENDED RELEASE ORAL at 17:01

## 2021-04-06 RX ADMIN — ACETAMINOPHEN 650 MG: 325 TABLET, FILM COATED ORAL at 06:18

## 2021-04-06 RX ADMIN — ROSUVASTATIN CALCIUM 20 MG: 10 TABLET, COATED ORAL at 09:35

## 2021-04-06 RX ADMIN — OXYCODONE HYDROCHLORIDE AND ACETAMINOPHEN 500 MG: 500 TABLET ORAL at 09:36

## 2021-04-06 RX ADMIN — GUAIFENESIN 1200 MG: 600 TABLET, EXTENDED RELEASE ORAL at 09:36

## 2021-04-06 RX ADMIN — IPRATROPIUM BROMIDE AND ALBUTEROL 1 PUFF: 20; 100 SPRAY, METERED RESPIRATORY (INHALATION) at 08:26

## 2021-04-06 RX ADMIN — Medication 50 MG: at 09:35

## 2021-04-06 RX ADMIN — Medication 10 ML: at 14:00

## 2021-04-06 RX ADMIN — DEXAMETHASONE SODIUM PHOSPHATE 6 MG: 4 INJECTION, SOLUTION INTRAMUSCULAR; INTRAVENOUS at 12:16

## 2021-04-06 RX ADMIN — ACETAMINOPHEN 650 MG: 325 TABLET, FILM COATED ORAL at 22:20

## 2021-04-06 RX ADMIN — REMDESIVIR 100 MG: 100 INJECTION, POWDER, LYOPHILIZED, FOR SOLUTION INTRAVENOUS at 06:11

## 2021-04-06 RX ADMIN — AZITHROMYCIN MONOHYDRATE 500 MG: 500 INJECTION, POWDER, LYOPHILIZED, FOR SOLUTION INTRAVENOUS at 12:15

## 2021-04-06 RX ADMIN — ENOXAPARIN SODIUM 60 MG: 60 INJECTION SUBCUTANEOUS at 20:15

## 2021-04-06 RX ADMIN — ENOXAPARIN SODIUM 60 MG: 60 INJECTION SUBCUTANEOUS at 08:22

## 2021-04-06 RX ADMIN — LEVOTHYROXINE SODIUM 88 MCG: 0.09 TABLET ORAL at 12:15

## 2021-04-06 RX ADMIN — IPRATROPIUM BROMIDE AND ALBUTEROL 1 PUFF: 20; 100 SPRAY, METERED RESPIRATORY (INHALATION) at 14:32

## 2021-04-06 RX ADMIN — PANTOPRAZOLE SODIUM 40 MG: 40 INJECTION, POWDER, FOR SOLUTION INTRAVENOUS at 12:16

## 2021-04-06 RX ADMIN — IPRATROPIUM BROMIDE AND ALBUTEROL 1 PUFF: 20; 100 SPRAY, METERED RESPIRATORY (INHALATION) at 20:55

## 2021-04-06 NOTE — PROGRESS NOTES
Problem: Falls - Risk of  Goal: *Absence of Falls  Description: Document Marvin Tomlinson Fall Risk and appropriate interventions in the flowsheet. Outcome: Progressing Towards Goal  Note: Fall Risk Interventions:  Mobility Interventions: Bed/chair exit alarm, Patient to call before getting OOB         Medication Interventions: Evaluate medications/consider consulting pharmacy    Elimination Interventions: Call light in reach, Patient to call for help with toileting needs              Problem: Patient Education: Go to Patient Education Activity  Goal: Patient/Family Education  Outcome: Progressing Towards Goal     Problem: Pressure Injury - Risk of  Goal: *Prevention of pressure injury  Description: Document Alf Scale and appropriate interventions in the flowsheet.   Outcome: Progressing Towards Goal  Note: Pressure Injury Interventions:  Sensory Interventions: Assess changes in LOC    Moisture Interventions: Absorbent underpads    Activity Interventions: Pressure redistribution bed/mattress(bed type)    Mobility Interventions: HOB 30 degrees or less, Pressure redistribution bed/mattress (bed type)    Nutrition Interventions: Document food/fluid/supplement intake, Discuss nutritional consult with provider    Friction and Shear Interventions: HOB 30 degrees or less                Problem: Patient Education: Go to Patient Education Activity  Goal: Patient/Family Education  Outcome: Progressing Towards Goal

## 2021-04-06 NOTE — PROGRESS NOTES
Bedside and Verbal shift change report given to Whit Xie RN (oncoming nurse) by Rita Conroy RN (offgoing nurse). Report included the following information SBAR, Kardex, MAR and Cardiac Rhythm Sinus Dwain/NSR.     0730: Patient without complaints of pain. On Hi-flow. O2 sats 99%  0930: Assisted patient to bedside recliner. O2 sats 93%. Patient without complaints. 1130: Patient without complaints. In bedside chair. Call light in reach. 1400: Patient without complaints. 1600: Patient tolerating full liquid diet. 1800: Patient without complaints. In bedside recliner. Watching TV on her cell phone. Call light in reach. Encouraged patient to call for assistance before getting out of bed. Bedside and Verbal shift change report given to Jayleen Rizvi RN (oncoming nurse) by Whit Xie RN (offgoing nurse). Report included the following information SBAR, Kardex, MAR and Cardiac Rhythm NSR.

## 2021-04-06 NOTE — PROGRESS NOTES
Progress Note  Date:2021       Room:[address] [place] [address]  Patient Rhianna Cabral     YOB: 1990     Age:30 y.o. Subjective    CC: less SOB. Hungry    24 HOUR: remains on high flow. Not requiring precedex. Objective         Vitals Last 24 Hours:  TEMPERATURE:  Temp  Av.5 °F (36.9 °C)  Min: 98.2 °F (36.8 °C)  Max: 98.7 °F (37.1 °C)  RESPIRATIONS RANGE: Resp  Av.2  Min: 13  Max: 31  PULSE OXIMETRY RANGE: SpO2  Av.1 %  Min: 90 %  Max: 100 %  PULSE RANGE: Pulse  Av.4  Min: 56  Max: 110  BLOOD PRESSURE RANGE: Systolic (74HQG), WPV:814 , Min:108 , APF:741   ; Diastolic (93UWT), LQE:97, Min:64, Max:85    I/O (24Hr): Intake/Output Summary (Last 24 hours) at 2021 1452  Last data filed at 2021 0600  Gross per 24 hour   Intake 520 ml   Output 1555 ml   Net -1035 ml     Objective     PE:  Gen: awake, alert, interactive  HEENT: NCAT  Chest: symmetrical chest rise  CV: r/r/r  Abd: obese, non-distended  Ext: no c/c/e  Neuro: interactive, follows commands. Moves all ext. No focal deficits. Labs/Imaging/Diagnostics    Labs:  CBC:  Recent Labs     21  0303 04/05/21  0505 21  0538   WBC 4.4 4.0 3.7   RBC 4.29 3.78* 4.06   HGB 11.5 10.2* 10.7*   HCT 37.6 32.6* 35.1   MCV 87.6 86.2 86.5   RDW 13.8 14.5 14.5    245 191     CHEMISTRIES:  Recent Labs     21  0303 04/05/21  0505 21  0538    144 141   K 3.7 4.0 4.7    110* 107   CO2 30 30 28   BUN 18 21* 15   CA 8.2* 8.8 8.6   PHOS  --  3.1  --    MG  --  2.7*  --    PT/INR:No results for input(s): INR, INREXT in the last 72 hours. No lab exists for component: PROTIME  APTT:No results for input(s): APTT in the last 72 hours.   LIVER PROFILE:  Recent Labs     21  0303 21  0505 21  0538   AST 64* 38*  42* 52*  50*   ALT 85* 64  65 71  70     Lab Results   Component Value Date/Time    ALT (SGPT) 85 (H) 2021 03:03 AM    AST (SGOT) 64 (H) 04/06/2021 03:03 AM    Alk. phosphatase 67 04/06/2021 03:03 AM    Bilirubin, direct 0.1 04/06/2021 03:03 AM    Bilirubin, total 0.4 04/06/2021 03:03 AM       Imaging Last 24 Hours:  No results found. Assessment//Plan     26 y/o with hx asthma, HLD, anxiety/depression, hypothyroid, obesity admitted 3/31 with COVID, with course complicated by increasing O2 requirements. COVID:  -- s/p actemra and convalescent plasma  -- continue dex x 10 days  -- continue remdesivir    Acute Resp Failure:  -- acute with hypoxia  -- secondary to above  -- continue high flow/NiPPV as needed for O2 sat >92  -- mobilize as able. -- CT without evidence of PE  -- continue to self prone. Asthma:  -- continue prn nebs    HLD:  -- continue home statin    Hypothyroid:  -- continue home meds    Obesity:   -- complicates above issues    Bradycardia:  -- normal BP  -- likely precedex related  -- continue tele    CCM time 40 min. Pt at risk of life threatening deterioration from COVID    Pt seen and evaluated via tele interaction. Audio and video used for this encounter.     Electronically signed by Ashlee Ellison DO on 4/6/2021 at 2:52 PM

## 2021-04-06 NOTE — PROGRESS NOTES
ransition of care note:    Pt is Covid 19 +    Pt has been off the precedex gtt since . Pt is on high flow oxygen (30L.50 % Fio2)    Pt confirmed her  address being in [address] [place] [address]. She  has been in [place] with her son, mother and father as her grandmother   . Family has   clearing out  Grandmother's home  and staying there while in [place]. Per pt,. Pt and pt's family are not able to return to [place] until pt is COVID-19 negative and not symptomatic.  Pt states her parents are now vaccinated and she is the only one who is sick. Pt wishes to return home with family at time of discharge        Current Designated IfOnly:   Primary Decision Maker: Db Ruiziver - Mother - [phone]    Primary Decision Maker: Jennifer Day - Father - [phone]    Maceo Cooks

## 2021-04-06 NOTE — PROGRESS NOTES
1910 Received report. Patient sitting in chair. 2000 Patient on hiflow O2 20 L & 40%. 2200 Patient assisted back in bed.  2220 Tylenol 650 mg po given for headache.  0100 Patient resting in bed.  0245 O2 sat 85-87% , patient doesn't want to prone, rather sit up in chair. FIO2 increased to 65%. 4820 Labs drawn. 0330 Patient assisted up to chair. 0630 Patient resting in chair. 0715 Bedside shift change report given to [facility] . Report included the following information SBAR, Kardex, ED Summary, Intake/Output, MAR, Accordion, Recent Results, Med Rec Status and Cardiac Rhythm SB/SR.

## 2021-04-06 NOTE — PROGRESS NOTES
1900 Bedside and Verbal shift change report given to Ty Mendoza (oncoming nurse) by Nilo Wetzel (offgoing nurse). Report included the following information SBAR, Kardex, Intake/Output, MAR, Accordion and Cardiac Rhythm NSR. Shift Summary: No acute events overnight. Patient tolerates hi flow nc. Due to anxiety, she stayed on hi flow nc tonight with her bed elevated and her sats maintained above 93%. 0700 Bedside and Verbal shift change report given to Nilo Wetzel (oncoming nurse) by Ty Mendoza (offgoing nurse). Report included the following information SBAR, Kardex, Intake/Output, MAR, Accordion, Recent Results and Cardiac Rhythm NSR.

## 2021-04-06 NOTE — PROGRESS NOTES
Andrés Orellana [facility] 79  380 [facility], [address] [place] [address]  [phone]      Medical Progress Note      NAME: Grace Rosenthal   :  1990  MRM:  [id number]    Date of service: 2021  7:09 AM       Assessment and Plan:   1. Acute respiratory failure with hypoxia  - 2/2 COVID. Worsening. On high flow O2 at 30LPM. Getting better. Continue to monitor closely. 2.  Pneumonia due to COVID-19 virus (3/31/2021) -  has significantly decompensated. CTA of the chest without e/o PE. Continue solumedrol,Remdesivir started 4/3. Received Actemra and convalescence plasma. Continue vitamin C/zinc. Incentive spirometry, OOB, prone if able. Trend inflammatory markers. intensivist  following. 3.  Thrombocytopenia ([address] (3/31/2021) - likely 2/2 viral process. Resolved     4. Asthma (3/31/2021). on Combivent, albuterol PRN. Not wheezing      5. Hyperlipidemia (3/31/2021). on statin     6. Acquired hypothyroidism (3/31/2021). on levothyroxine     7. Anxiety and depression (3/31/2021). on Ativan PRN      8. Obesity (3/31/2021) - morbid. Would benefit from weight loss. 9.  Bradycardia. Likely due to precedex. Off precedex. Better HR      Updated her mother. Subjective:     Chief Complaint[de-identified] Patient was seen and examined as a follow up for COVID pneumonia. Chart was reviewed. on high flow. Fells better   ROS:  (bold if positive, if negative)    Tolerating PT  Tolerating Diet        Objective:     Last 24hrs VS reviewed since prior progress note.  Most recent are:    Visit Vitals  /81   Pulse 60   Temp 98.6 °F (37 °C)   Resp 22   Ht 5' 3\" (1.6 m)   Wt 111.3 kg (245 lb 6 oz)   SpO2 95%   BMI 43.47 kg/m²     SpO2 Readings from Last 6 Encounters:   21 95%    O2 Flow Rate (L/min): 30 l/min       Intake/Output Summary (Last 24 hours) at 2021 0848  Last data filed at 2021 0600  Gross per 24 hour   Intake 520 ml   Output 2325 ml   Net -1805 ml        Physical Exam:    Gen:  Well-developed, well-nourished, in no acute distress  HEENT:  Pink conjunctivae, PERRL, hearing intact to voice, moist mucous membranes  Neck:  Supple, without masses, thyroid non-tender  Resp:  No accessory muscle use, clear breath sounds without wheezes rales or rhonchi  Card:  No murmurs, normal S1, S2 without thrills, bruits or peripheral edema  Abd:  Soft, non-tender, non-distended, normoactive bowel sounds are present, no palpable organomegaly and no detectable hernias  Lymph:  No cervical or inguinal adenopathy  Musc:  No cyanosis or clubbing  Skin:  No rashes or ulcers, skin turgor is good  Neuro:  Cranial nerves are grossly intact, no focal motor weakness, follows commands appropriately  Psych:  Good insight, oriented to person, place and time, alert  __________________________________________________________________  Medications Reviewed: (see below)  Medications:     Current Facility-Administered Medications   Medication Dose Route Frequency    glucose chewable tablet 16 g  4 Tab Oral PRN    dextrose (D50W) injection syrg 12.5-25 g  12.5-25 g IntraVENous PRN    glucagon (GLUCAGEN) injection 1 mg  1 mg IntraMUSCular PRN    insulin lispro (HUMALOG) injection   SubCUTAneous Q6H    albuterol (PROVENTIL HFA, VENTOLIN HFA, PROAIR HFA) inhaler 2 Puff  2 Puff Inhalation Q4H PRN    Or    albuterol (PROVENTIL VENTOLIN) nebulizer solution 2.5 mg  2.5 mg Nebulization Q4H PRN    budesonide-formoteroL (SYMBICORT) 160-4.5 mcg/actuation HFA inhaler 2 Puff  2 Puff Inhalation BID RT    Or    arformoterol 15 mcg/budesonide 0.5 mg neb solution   Nebulization BID RT    ipratropium-albuterol (COMBIVENT RESPIMAT) 20 mcg-100 mcg inhalation spray  1 Puff Inhalation Q6HWA RT    Or    albuterol-ipratropium (DUO-NEB) 2.5 MG-0.5 MG/3 ML  3 mL Nebulization Q6HWA RT    ipratropium-albuterol (COMBIVENT RESPIMAT) 20 mcg-100 mcg inhalation spray  1 Puff Inhalation Q4H PRN    Or    albuterol-ipratropium (DUO-NEB) 2.5 MG-0.5 MG/3 ML  3 mL Nebulization Q4H PRN    [START ON 4/11/2021] levothyroxine (SYNTHROID) injection 65 mcg  65 mcg IntraVENous Q24H    LORazepam (ATIVAN) injection 0.5 mg  0.5 mg IntraVENous Q6H PRN    dexmedeTOMidine in 0.9 % NaCl (PRECEDEX) 400 mcg/100 mL (4 mcg/mL) infusion soln  0.1-1.5 mcg/kg/hr IntraVENous TITRATE    azithromycin (ZITHROMAX) 500 mg in 0.9% sodium chloride 250 mL (VIAL-MATE)  500 mg IntraVENous Q24H    cefTRIAXone (ROCEPHIN) 2 g in sterile water (preservative free) 20 mL IV syringe  2 g IntraVENous Q24H    pantoprazole (PROTONIX) 40 mg in 0.9% sodium chloride 10 mL injection  40 mg IntraVENous Q24H    dexamethasone (DECADRON) 4 mg/mL injection 6 mg  6 mg IntraVENous Q24H    0.9% sodium chloride infusion 250 mL  250 mL IntraVENous PRN    morphine injection 2 mg  2 mg IntraVENous Q4H PRN    zolpidem (AMBIEN) tablet 5 mg  5 mg Oral QHS PRN    0.9% sodium chloride infusion 250 mL  250 mL IntraVENous PRN    hydrOXYzine HCL (ATARAX) tablet 10 mg  10 mg Oral TID PRN    HYDROcodone-chlorpheniramine (TUSSIONEX) oral suspension 5 mL  5 mL Oral Q12H PRN    guaiFENesin ER (MUCINEX) tablet 1,200 mg  1,200 mg Oral BID    enoxaparin (LOVENOX) injection 60 mg  60 mg SubCUTAneous Q12H    benzonatate (TESSALON) capsule 100 mg  100 mg Oral TID PRN    guaiFENesin-dextromethorphan (ROBITUSSIN DM) 100-10 mg/5 mL syrup 5 mL  5 mL Oral Q6H PRN    morphine IR (MS IR) tablet 15 mg  15 mg Oral Q4H PRN    rosuvastatin (CRESTOR) tablet 20 mg  20 mg Oral DAILY    sodium chloride (NS) flush 5-40 mL  5-40 mL IntraVENous Q8H    sodium chloride (NS) flush 5-40 mL  5-40 mL IntraVENous PRN    acetaminophen (TYLENOL) tablet 650 mg  650 mg Oral Q6H PRN    Or    acetaminophen (TYLENOL) suppository 650 mg  650 mg Rectal Q6H PRN    polyethylene glycol (MIRALAX) packet 17 g  17 g Oral DAILY PRN    promethazine (PHENERGAN) tablet 12.5 mg  12.5 mg Oral Q6H PRN    Or    ondansetron (ZOFRAN) injection 4 mg 4 mg IntraVENous Q6H PRN    ascorbic acid (vitamin C) (VITAMIN C) tablet 500 mg  500 mg Oral DAILY    zinc gluconate tablet 50 mg  50 mg Oral DAILY        Lab Data Reviewed: (see below)  Lab Review:     Recent Labs     04/06/21  0303 04/05/21  0505 04/04/21  0538   WBC 4.4 4.0 3.7   HGB 11.5 10.2* 10.7*   HCT 37.6 32.6* 35.1    245 191     Recent Labs     04/06/21  0303 04/05/21  0505 04/04/21  0538    144 141   K 3.7 4.0 4.7    110* 107   CO2 30 30 28   GLU 85 135* 121*   BUN 18 21* 15   CREA 0.72 0.71 0.71   CA 8.2* 8.8 8.6   MG  --  2.7*  --    PHOS  --  3.1  --    ALB 3.1* 3.0*  2.8* 2.9*  2.8*   TBILI 0.4 0.2  0.3 0.4  0.4   ALT 85* 64  65 71  70     Lab Results   Component Value Date/Time    Glucose (POC) 87 04/06/2021 06:12 AM    Glucose (POC) 104 (H) 04/06/2021 12:09 AM    Glucose (POC) 133 (H) 04/05/2021 06:09 PM    Glucose (POC) 103 (H) 04/05/2021 12:38 PM     Recent Labs     04/05/21  0816 04/04/21 2024 04/04/21  0355   PH 7.38 7.37 7.33*   PCO2 50* 49* 53*   PO2 105* 127* 69*   HCO3 29* 27* 27*   FIO2 60 80 100     No results for input(s): INR, INREXT, INREXT in the last 72 hours. All Micro Results     Procedure Component Value Units Date/Time    CULTURE, URINE [[id number]] Collected: 04/04/21 1528    Order Status: Completed Specimen: Cath Urine Updated: 04/05/21 9191     Special Requests: NO SPECIAL REQUESTS        Culture result: No growth (<1,000 CFU/ML)             I have reviewed notes of prior 24hr. Other pertinent lab: Total time spent with patient: 35 of critical time I personally reviewed chart, notes, data and current medications in the medical record. I have personally examined and treated the patient at bedside during this period.                  Care Plan discussed with: Patient, Nursing Staff and >50% of time spent in counseling and coordination of care    Discussed:  Care Plan    Prophylaxis:  Lovenox    Disposition:  Home w/Family           ___________________________________________________    Attending Physician: Kimberley Rodriguez MD

## 2021-04-07 LAB
ALBUMIN SERPL-MCNC: 3.1 G/DL (ref 3.5–5)
ALBUMIN/GLOB SERPL: 0.8 {RATIO} (ref 1.1–2.2)
ALP SERPL-CCNC: 63 U/L (ref 45–117)
ALT SERPL-CCNC: 89 U/L (ref 12–78)
ANION GAP SERPL CALC-SCNC: 3 MMOL/L (ref 5–15)
AST SERPL-CCNC: 56 U/L (ref 15–37)
BASOPHILS # BLD: 0 K/UL (ref 0–0.1)
BASOPHILS NFR BLD: 0 % (ref 0–1)
BILIRUB DIRECT SERPL-MCNC: 0.2 MG/DL (ref 0–0.2)
BILIRUB SERPL-MCNC: 0.6 MG/DL (ref 0.2–1)
BUN SERPL-MCNC: 22 MG/DL (ref 6–20)
BUN/CREAT SERPL: 28 (ref 12–20)
CALCIUM SERPL-MCNC: 8.3 MG/DL (ref 8.5–10.1)
CHLORIDE SERPL-SCNC: 104 MMOL/L (ref 97–108)
CO2 SERPL-SCNC: 32 MMOL/L (ref 21–32)
CREAT SERPL-MCNC: 0.78 MG/DL (ref 0.55–1.02)
CRP SERPL-MCNC: 1.51 MG/DL (ref 0–0.6)
D DIMER PPP FEU-MCNC: 0.97 MG/L FEU (ref 0–0.65)
DIFFERENTIAL METHOD BLD: ABNORMAL
EOSINOPHIL # BLD: 0.1 K/UL (ref 0–0.4)
EOSINOPHIL NFR BLD: 2 % (ref 0–7)
ERYTHROCYTE [DISTWIDTH] IN BLOOD BY AUTOMATED COUNT: 13.8 % (ref 11.5–14.5)
FERRITIN SERPL-MCNC: 305 NG/ML (ref 26–388)
GLOBULIN SER CALC-MCNC: 3.7 G/DL (ref 2–4)
GLUCOSE BLD STRIP.AUTO-MCNC: 108 MG/DL (ref 65–100)
GLUCOSE BLD STRIP.AUTO-MCNC: 124 MG/DL (ref 65–100)
GLUCOSE BLD STRIP.AUTO-MCNC: 128 MG/DL (ref 65–100)
GLUCOSE BLD STRIP.AUTO-MCNC: 78 MG/DL (ref 65–100)
GLUCOSE SERPL-MCNC: 74 MG/DL (ref 65–100)
HCT VFR BLD AUTO: 35.3 % (ref 35–47)
HGB BLD-MCNC: 11.4 G/DL (ref 11.5–16)
IMM GRANULOCYTES # BLD AUTO: 0 K/UL
IMM GRANULOCYTES NFR BLD AUTO: 0 %
LYMPHOCYTES # BLD: 1.4 K/UL (ref 0.8–3.5)
LYMPHOCYTES NFR BLD: 35 % (ref 12–49)
MCH RBC QN AUTO: 27.2 PG (ref 26–34)
MCHC RBC AUTO-ENTMCNC: 32.3 G/DL (ref 30–36.5)
MCV RBC AUTO: 84.2 FL (ref 80–99)
METAMYELOCYTES NFR BLD MANUAL: 2 %
MONOCYTES # BLD: 0.3 K/UL (ref 0–1)
MONOCYTES NFR BLD: 8 % (ref 5–13)
NEUTS BAND NFR BLD MANUAL: 2 % (ref 0–6)
NEUTS SEG # BLD: 2.2 K/UL (ref 1.8–8)
NEUTS SEG NFR BLD: 51 % (ref 32–75)
NRBC # BLD: 0 K/UL (ref 0–0.01)
NRBC BLD-RTO: 0 PER 100 WBC
PLATELET # BLD AUTO: 307 K/UL (ref 150–400)
PMV BLD AUTO: 10.1 FL (ref 8.9–12.9)
POTASSIUM SERPL-SCNC: 3.2 MMOL/L (ref 3.5–5.1)
PROT SERPL-MCNC: 6.8 G/DL (ref 6.4–8.2)
RBC # BLD AUTO: 4.19 M/UL (ref 3.8–5.2)
RBC MORPH BLD: ABNORMAL
SERVICE CMNT-IMP: ABNORMAL
SERVICE CMNT-IMP: NORMAL
SODIUM SERPL-SCNC: 139 MMOL/L (ref 136–145)
WBC # BLD AUTO: 4.1 K/UL (ref 3.6–11)

## 2021-04-07 PROCEDURE — 74011250636 HC RX REV CODE- 250/636: Performed by: INTERNAL MEDICINE

## 2021-04-07 PROCEDURE — 65610000006 HC RM INTENSIVE CARE

## 2021-04-07 PROCEDURE — 74011000250 HC RX REV CODE- 250: Performed by: NURSE PRACTITIONER

## 2021-04-07 PROCEDURE — 74011250636 HC RX REV CODE- 250/636: Performed by: NURSE PRACTITIONER

## 2021-04-07 PROCEDURE — 36415 COLL VENOUS BLD VENIPUNCTURE: CPT

## 2021-04-07 PROCEDURE — 85025 COMPLETE CBC W/AUTO DIFF WBC: CPT

## 2021-04-07 PROCEDURE — 74011250637 HC RX REV CODE- 250/637: Performed by: INTERNAL MEDICINE

## 2021-04-07 PROCEDURE — 80053 COMPREHEN METABOLIC PANEL: CPT

## 2021-04-07 PROCEDURE — 94640 AIRWAY INHALATION TREATMENT: CPT

## 2021-04-07 PROCEDURE — 85379 FIBRIN DEGRADATION QUANT: CPT

## 2021-04-07 PROCEDURE — 82962 GLUCOSE BLOOD TEST: CPT

## 2021-04-07 PROCEDURE — 86140 C-REACTIVE PROTEIN: CPT

## 2021-04-07 PROCEDURE — 82248 BILIRUBIN DIRECT: CPT

## 2021-04-07 PROCEDURE — 82728 ASSAY OF FERRITIN: CPT

## 2021-04-07 PROCEDURE — 94760 N-INVAS EAR/PLS OXIMETRY 1: CPT

## 2021-04-07 PROCEDURE — 94664 DEMO&/EVAL PT USE INHALER: CPT

## 2021-04-07 PROCEDURE — 77010033711 HC HIGH FLOW OXYGEN

## 2021-04-07 RX ORDER — POTASSIUM CHLORIDE 750 MG/1
40 TABLET, FILM COATED, EXTENDED RELEASE ORAL
Status: COMPLETED | OUTPATIENT
Start: 2021-04-07 | End: 2021-04-07

## 2021-04-07 RX ORDER — PANTOPRAZOLE SODIUM 40 MG/1
40 TABLET, DELAYED RELEASE ORAL
Status: DISCONTINUED | OUTPATIENT
Start: 2021-04-07 | End: 2021-04-11 | Stop reason: HOSPADM

## 2021-04-07 RX ADMIN — Medication 50 MG: at 08:23

## 2021-04-07 RX ADMIN — ONDANSETRON 4 MG: 2 INJECTION INTRAMUSCULAR; INTRAVENOUS at 11:26

## 2021-04-07 RX ADMIN — ROSUVASTATIN CALCIUM 20 MG: 10 TABLET, COATED ORAL at 08:24

## 2021-04-07 RX ADMIN — ENOXAPARIN SODIUM 60 MG: 60 INJECTION SUBCUTANEOUS at 08:24

## 2021-04-07 RX ADMIN — PANTOPRAZOLE SODIUM 40 MG: 40 TABLET, DELAYED RELEASE ORAL at 13:09

## 2021-04-07 RX ADMIN — AZITHROMYCIN MONOHYDRATE 500 MG: 500 INJECTION, POWDER, LYOPHILIZED, FOR SOLUTION INTRAVENOUS at 11:03

## 2021-04-07 RX ADMIN — OXYCODONE HYDROCHLORIDE AND ACETAMINOPHEN 500 MG: 500 TABLET ORAL at 08:24

## 2021-04-07 RX ADMIN — DEXAMETHASONE SODIUM PHOSPHATE 6 MG: 4 INJECTION, SOLUTION INTRAMUSCULAR; INTRAVENOUS at 11:26

## 2021-04-07 RX ADMIN — Medication 10 ML: at 13:10

## 2021-04-07 RX ADMIN — Medication 10 ML: at 23:33

## 2021-04-07 RX ADMIN — Medication 10 ML: at 05:51

## 2021-04-07 RX ADMIN — IPRATROPIUM BROMIDE AND ALBUTEROL 1 PUFF: 20; 100 SPRAY, METERED RESPIRATORY (INHALATION) at 21:06

## 2021-04-07 RX ADMIN — ENOXAPARIN SODIUM 60 MG: 60 INJECTION SUBCUTANEOUS at 20:40

## 2021-04-07 RX ADMIN — BUDESONIDE AND FORMOTEROL FUMARATE DIHYDRATE 2 PUFF: 160; 4.5 AEROSOL RESPIRATORY (INHALATION) at 07:29

## 2021-04-07 RX ADMIN — POTASSIUM CHLORIDE 40 MEQ: 750 TABLET, FILM COATED, EXTENDED RELEASE ORAL at 08:24

## 2021-04-07 RX ADMIN — CEFTRIAXONE 2 G: 2 INJECTION, POWDER, FOR SOLUTION INTRAMUSCULAR; INTRAVENOUS at 11:03

## 2021-04-07 RX ADMIN — IPRATROPIUM BROMIDE AND ALBUTEROL 1 PUFF: 20; 100 SPRAY, METERED RESPIRATORY (INHALATION) at 07:25

## 2021-04-07 RX ADMIN — LEVOTHYROXINE SODIUM 88 MCG: 0.09 TABLET ORAL at 05:51

## 2021-04-07 RX ADMIN — GUAIFENESIN 1200 MG: 600 TABLET, EXTENDED RELEASE ORAL at 18:19

## 2021-04-07 RX ADMIN — BUDESONIDE AND FORMOTEROL FUMARATE DIHYDRATE 2 PUFF: 160; 4.5 AEROSOL RESPIRATORY (INHALATION) at 21:06

## 2021-04-07 RX ADMIN — GUAIFENESIN 1200 MG: 600 TABLET, EXTENDED RELEASE ORAL at 08:24

## 2021-04-07 RX ADMIN — IPRATROPIUM BROMIDE AND ALBUTEROL 1 PUFF: 20; 100 SPRAY, METERED RESPIRATORY (INHALATION) at 15:32

## 2021-04-07 NOTE — PROGRESS NOTES
1910 Received report. Patient sitting in chair. 0115 Patient sleeping in bed, prone. 0500 Patient sitting in chair. 0700 Bedside shift change report given to [facility] . Report included the following information SBAR, Kardex, ED Summary, Procedure Summary, Intake/Output, MAR, Accordion, Recent Results, Med Rec Status and Cardiac Rhythm SB/SR.

## 2021-04-07 NOTE — PROGRESS NOTES
Andrés Orellana [facility] 79  380 [facility], [address] [place] [address]  [phone]      Medical Progress Note      NAME: Parth Augustin   :  1990  MRM:  [id number]    Date of service: 2021  7:09 AM       Assessment and Plan:   1. Acute respiratory failure with hypoxia  - 2/2 COVID. Improving. On high flow O2 at 20LPM. Getting better and feels better. No more using BiPAP. Continue to monitor closely. 2.  Pneumonia due to COVID-19 virus (3/31/2021) -  has significantly decompensated, but now improving. CTA of the chest without e/o PE. Continue solumedrol,Remdesivir started 4/3. Received Actemra and convalescence plasma. Continue vitamin C/zinc. Incentive spirometry, OOB, prone if able. Trend inflammatory markers. Intensivist/ pulmonary following. 3.  Thrombocytopenia ([address] (3/31/2021) - likely 2/2 viral process. Resolved     4. Asthma (3/31/2021). on Combivent, albuterol PRN. Not wheezing      5. Hyperlipidemia (3/31/2021). on statin     6. Acquired hypothyroidism (3/31/2021). on levothyroxine     7. Anxiety and depression (3/31/2021). on Ativan PRN      8. Obesity (3/31/2021) - morbid. Would benefit from weight loss. 9.  Bradycardia. Likely due to precedex. Off precedex. Better HR      Updated her mother. Subjective:     Chief Complaint[de-identified] Patient was seen and examined as a follow up for COVID pneumonia. Chart was reviewed. on high flow. Fells better   ROS:  (bold if positive, if negative)    Tolerating PT  Tolerating Diet        Objective:     Last 24hrs VS reviewed since prior progress note.  Most recent are:    Visit Vitals  /81   Pulse (!) 49   Temp 98.2 °F (36.8 °C)   Resp (!) 32   Ht 5' 3\" (1.6 m)   Wt 111.3 kg (245 lb 6 oz)   SpO2 95%   BMI 43.47 kg/m²     SpO2 Readings from Last 6 Encounters:   21 95%    O2 Flow Rate (L/min): 20 l/min       Intake/Output Summary (Last 24 hours) at 2021 0759  Last data filed at 2021 9773  Gross per 24 hour   Intake 610 ml   Output 995 ml   Net -385 ml        Physical Exam:    Gen:  Well-developed, well-nourished, in no acute distress  HEENT:  Pink conjunctivae, PERRL, hearing intact to voice, moist mucous membranes  Neck:  Supple, without masses, thyroid non-tender  Resp:  No accessory muscle use, clear breath sounds without wheezes rales or rhonchi  Card:  No murmurs, normal S1, S2 without thrills, bruits or peripheral edema  Abd:  Soft, non-tender, non-distended, normoactive bowel sounds are present, no palpable organomegaly and no detectable hernias  Lymph:  No cervical or inguinal adenopathy  Musc:  No cyanosis or clubbing  Skin:  No rashes or ulcers, skin turgor is good  Neuro:  Cranial nerves are grossly intact, no focal motor weakness, follows commands appropriately  Psych:  Good insight, oriented to person, place and time, alert  __________________________________________________________________  Medications Reviewed: (see below)  Medications:     Current Facility-Administered Medications   Medication Dose Route Frequency    levothyroxine (SYNTHROID) tablet 88 mcg  88 mcg Oral 6am    glucose chewable tablet 16 g  4 Tab Oral PRN    dextrose (D50W) injection syrg 12.5-25 g  12.5-25 g IntraVENous PRN    glucagon (GLUCAGEN) injection 1 mg  1 mg IntraMUSCular PRN    insulin lispro (HUMALOG) injection   SubCUTAneous Q6H    albuterol (PROVENTIL HFA, VENTOLIN HFA, PROAIR HFA) inhaler 2 Puff  2 Puff Inhalation Q4H PRN    Or    albuterol (PROVENTIL VENTOLIN) nebulizer solution 2.5 mg  2.5 mg Nebulization Q4H PRN    budesonide-formoteroL (SYMBICORT) 160-4.5 mcg/actuation HFA inhaler 2 Puff  2 Puff Inhalation BID RT    Or    arformoterol 15 mcg/budesonide 0.5 mg neb solution   Nebulization BID RT    ipratropium-albuterol (COMBIVENT RESPIMAT) 20 mcg-100 mcg inhalation spray  1 Puff Inhalation Q6HWA RT    Or    albuterol-ipratropium (DUO-NEB) 2.5 MG-0.5 MG/3 ML  3 mL Nebulization Q6HWA RT    ipratropium-albuterol (COMBIVENT RESPIMAT) 20 mcg-100 mcg inhalation spray  1 Puff Inhalation Q4H PRN    Or    albuterol-ipratropium (DUO-NEB) 2.5 MG-0.5 MG/3 ML  3 mL Nebulization Q4H PRN    LORazepam (ATIVAN) injection 0.5 mg  0.5 mg IntraVENous Q6H PRN    dexmedeTOMidine in 0.9 % NaCl (PRECEDEX) 400 mcg/100 mL (4 mcg/mL) infusion soln  0.1-1.5 mcg/kg/hr IntraVENous TITRATE    azithromycin (ZITHROMAX) 500 mg in 0.9% sodium chloride 250 mL (VIAL-MATE)  500 mg IntraVENous Q24H    cefTRIAXone (ROCEPHIN) 2 g in sterile water (preservative free) 20 mL IV syringe  2 g IntraVENous Q24H    pantoprazole (PROTONIX) 40 mg in 0.9% sodium chloride 10 mL injection  40 mg IntraVENous Q24H    dexamethasone (DECADRON) 4 mg/mL injection 6 mg  6 mg IntraVENous Q24H    0.9% sodium chloride infusion 250 mL  250 mL IntraVENous PRN    morphine injection 2 mg  2 mg IntraVENous Q4H PRN    zolpidem (AMBIEN) tablet 5 mg  5 mg Oral QHS PRN    0.9% sodium chloride infusion 250 mL  250 mL IntraVENous PRN    hydrOXYzine HCL (ATARAX) tablet 10 mg  10 mg Oral TID PRN    HYDROcodone-chlorpheniramine (TUSSIONEX) oral suspension 5 mL  5 mL Oral Q12H PRN    guaiFENesin ER (MUCINEX) tablet 1,200 mg  1,200 mg Oral BID    enoxaparin (LOVENOX) injection 60 mg  60 mg SubCUTAneous Q12H    benzonatate (TESSALON) capsule 100 mg  100 mg Oral TID PRN    guaiFENesin-dextromethorphan (ROBITUSSIN DM) 100-10 mg/5 mL syrup 5 mL  5 mL Oral Q6H PRN    morphine IR (MS IR) tablet 15 mg  15 mg Oral Q4H PRN    rosuvastatin (CRESTOR) tablet 20 mg  20 mg Oral DAILY    sodium chloride (NS) flush 5-40 mL  5-40 mL IntraVENous Q8H    sodium chloride (NS) flush 5-40 mL  5-40 mL IntraVENous PRN    acetaminophen (TYLENOL) tablet 650 mg  650 mg Oral Q6H PRN    Or    acetaminophen (TYLENOL) suppository 650 mg  650 mg Rectal Q6H PRN    polyethylene glycol (MIRALAX) packet 17 g  17 g Oral DAILY PRN    promethazine (PHENERGAN) tablet 12.5 mg  12.5 mg Oral Q6H PRN    Or    ondansetron (ZOFRAN) injection 4 mg  4 mg IntraVENous Q6H PRN    ascorbic acid (vitamin C) (VITAMIN C) tablet 500 mg  500 mg Oral DAILY    zinc gluconate tablet 50 mg  50 mg Oral DAILY        Lab Data Reviewed: (see below)  Lab Review:     Recent Labs     04/07/21 0319 04/06/21  0303 04/05/21  0505   WBC 4.1 4.4 4.0   HGB 11.4* 11.5 10.2*   HCT 35.3 37.6 32.6*    281 245     Recent Labs     04/07/21 0319 04/06/21  0303 04/05/21  0505    141 144   K 3.2* 3.7 4.0    106 110*   CO2 32 30 30   GLU 74 85 135*   BUN 22* 18 21*   CREA 0.78 0.72 0.71   CA 8.3* 8.2* 8.8   MG  --   --  2.7*   PHOS  --   --  3.1   ALB 3.1* 3.1* 3.0*  2.8*   TBILI 0.6 0.4 0.2  0.3   ALT 89* 85* 64  65     Lab Results   Component Value Date/Time    Glucose (POC) 78 04/07/2021 05:50 AM    Glucose (POC) 81 04/06/2021 10:27 PM    Glucose (POC) 101 (H) 04/06/2021 05:04 PM    Glucose (POC) 82 04/06/2021 12:29 PM    Glucose (POC) 87 04/06/2021 06:12 AM     Recent Labs     04/05/21  0816 04/04/21 2024   PH 7.38 7.37   PCO2 50* 49*   PO2 105* 127*   HCO3 29* 27*   FIO2 60 80     No results for input(s): INR, INREXT, INREXT in the last 72 hours. All Micro Results     Procedure Component Value Units Date/Time    CULTURE, URINE [[id number]] Collected: 04/04/21 1528    Order Status: Completed Specimen: Cath Urine Updated: 04/05/21 0851     Special Requests: NO SPECIAL REQUESTS        Culture result: No growth (<1,000 CFU/ML)             I have reviewed notes of prior 24hr. Other pertinent lab: Total time spent with patient: 35 of critical time I personally reviewed chart, notes, data and current medications in the medical record. I have personally examined and treated the patient at bedside during this period.                  Care Plan discussed with: Patient, Nursing Staff and >50% of time spent in counseling and coordination of care    Discussed:  Care Plan    Prophylaxis:  Lovenox    Disposition:  Home w/Family           ___________________________________________________    Attending Physician: Salvatore Booth MD

## 2021-04-07 NOTE — PROGRESS NOTES
Transition of care note:    RUR 24%    Pt is Covid 19+    Pt is on 20 L/60% Fio2    Pt is OOB to the chair. I am continuing to follow pt for possible home health or home oxygen needs.     Fritz Boateng

## 2021-04-07 NOTE — PROGRESS NOTES
Progress Note  Date:2021       Room:[address] [place] [address]  Patient Candi Medellin     YOB: 1990     Age:30 y.o. Subjective    CC: less SOB. Hungry    24 HOUR: 20L, 60%. Improving oxygenation. Feeling better. OOB      Objective         Vitals Last 24 Hours:  TEMPERATURE:  Temp  Av °F (36.7 °C)  Min: 97.3 °F (36.3 °C)  Max: 98.6 °F (37 °C)  RESPIRATIONS RANGE: Resp  Av  Min: 0  Max: 38  PULSE OXIMETRY RANGE: SpO2  Av.7 %  Min: 87 %  Max: 100 %  PULSE RANGE: Pulse  Av.3  Min: 47  Max: 109  BLOOD PRESSURE RANGE: Systolic (61QKV), ANASTASIA:115 , Min:108 , WJK:538   ; Diastolic (89HZO), NAW:71, Min:61, Max:93    I/O (24Hr): Intake/Output Summary (Last 24 hours) at 2021 1053  Last data filed at 2021 0900  Gross per 24 hour   Intake 610 ml   Output 1095 ml   Net -485 ml     Objective:  Vital signs: (most recent): Blood pressure 123/74, pulse 61, temperature 97.3 °F (36.3 °C), resp. rate 22, height 5' 3\" (1.6 m), weight 111.3 kg (245 lb 6 oz), SpO2 93 %. PE:  Gen: awake, alert, interactive  HEENT: NCAT  Chest: symmetrical chest rise  CV: r/r/r  Abd: obese, non-distended  Ext: no c/c/e  Neuro: interactive, follows commands. Moves all ext. No focal deficits. Labs/Imaging/Diagnostics    Labs:  CBC:  Recent Labs     21  0303 04/05/21  0505   WBC 4.1 4.4 4.0   RBC 4.19 4.29 3.78*   HGB 11.4* 11.5 10.2*   HCT 35.3 37.6 32.6*   MCV 84.2 87.6 86.2   RDW 13.8 13.8 14.5    281 245     CHEMISTRIES:  Recent Labs     21  0303 04/05/21  0505    141 144   K 3.2* 3.7 4.0    106 110*   CO2 32 30 30   BUN 22* 18 21*   CA 8.3* 8.2* 8.8   PHOS  --   --  3.1   MG  --   --  2.7*   PT/INR:No results for input(s): INR, INREXT, INREXT in the last 72 hours. No lab exists for component: PROTIME  APTT:No results for input(s): APTT in the last 72 hours.   LIVER PROFILE:  Recent Labs     21  0047 04/06/21  0303 04/05/21  0505   AST 56* 64* 38*  42*   ALT 89* 85* 64  65     Lab Results   Component Value Date/Time    ALT (SGPT) 89 (H) 04/07/2021 03:19 AM    AST (SGOT) 56 (H) 04/07/2021 03:19 AM    Alk. phosphatase 63 04/07/2021 03:19 AM    Bilirubin, direct 0.2 04/07/2021 03:19 AM    Bilirubin, total 0.6 04/07/2021 03:19 AM       Imaging Last 24 Hours:  No results found. Assessment//Plan     26 y/o with hx asthma, HLD, anxiety/depression, hypothyroid, obesity admitted 3/31 with COVID, with course complicated by increasing O2 requirements. COVID:  -- s/p actemra and convalescent plasma  -- continue dex x 10 days  -- continue remdesivir    Acute Resp Failure:  -- acute with hypoxia  -- secondary to above  -- continue high flow/NiPPV as needed for O2 sat >92  -- mobilize as able. -- CT without evidence of PE  -- continue to self prone. -- continues to have improving O2 needs. Off bipap for over 48hours. -- suspect resp issues have improved enough that diet can be advanced in order to maintain adequate nutrition. If needing bipap again will need to reassess. Asthma:  -- continue prn nebs    HLD:  -- continue home statin    Hypothyroid:  -- continue home meds    Obesity:   -- complicates above issues    Bradycardia:  -- normal BP  -- likely precedex related  -- continue tele    CCM time 40 min. Pt at risk of life threatening deterioration from COVID    Pt seen and evaluated via tele interaction. Audio and video used for this encounter.     Electronically signed by Aria Goel DO on 4/7/2021 at 2:52 PM

## 2021-04-07 NOTE — PROGRESS NOTES
0700- Verbal shift change report given to Kolton Rice RN/ONELIA Pearson (oncoming nurse) by Pamela Hughes RN (offgoing nurse). Report included the following information SBAR, Kardex, Intake/Output and Cardiac Rhythm NSR. Primary Nurse Sammie Peña RN and Felipe Rondon RN performed a dual skin assessment on this patient No impairment noted    0800- Assessment completed, see doc flowsheet. Pt assisted to reposition in chair. Dr. Rae Valadez in room to assess pt. Orders received to remove carrasco catheter, advance to regular diet, and wean O2 as tolerated. Pt currently on HFNC 20L @ 60%. 200- Dr. Masha Giordano in room via telehealth monitor to assess pt.     0900- Carrasco removed and pt assisted to void on BSC. 100 mL clear, yellow urine voided. O2 weaned to 20L @ 40%. 1000- Pt assisted to turn and reposition in chair. 1100- Pt placed on midflow nasal cannula 13L, O2 saturations at 95% or greater. 1200- Reassessment completed, see doc flowsheet. 1545- O2 weaned to 9L midflow nasal cannula. 1600- Reassessment completed, see doc flowsheet. Pt assisted to reposition in chair. 1800- Pt assisted to reposition in chair. Midflow down to 7lpm.     1900- Verbal shift change report given to Pamela Hughes RN (oncoming nurse) by Kolton Rice RN (offgoing nurse). Report included the following information SBAR, Kardex, Intake/Output and Cardiac Rhythm NSR.

## 2021-04-07 NOTE — PROGRESS NOTES
Comprehensive Nutrition Assessment    Type and Reason for Visit: Initial, RD nutrition re-screen/LOS    Nutrition Recommendations/Plan:    Add ONS to meals to promote increased protein intake   Please document PO & Supplement intake    Nutrition Assessment:    37: 27year old female admitted for Pneumonia due to COVID-19 virus [U07.1, J12.82]  COVID-19 [U07.1]  Acute respiratory failure ([address] [J96.00] who has no past medical history on file. Pt has been asleep or on the phone with others when RD has attempted to call into room. LORI RN and attended rounds. Pt has been eating ok. No nutrition related concerns at this time. Glad to see diet has been advanced. Malnutrition Assessment:  Malnutrition Status:  Insufficient data      Nutritionally Significant Medications: vitamin C, zithromax, decadron, zinc, synthroid    Estimated Daily Nutrient Needs:  Energy (kcal): 2162 kcal/d (MSJ xAF 1.2); Weight Used for Energy Requirements: Current  Protein (g): 78g (1.5g/kg of IBW); Weight Used for Protein Requirements: Ideal  Fluid (ml/day): 2162 ml+; Method Used for Fluid Requirements: 1 ml/kcal    Nutrition Related Findings:    Last BM: 3/7    Edema: none noted  Wounds:  None       Current Nutrition Therapies:   Diet: DIET NUTRITIONAL SUPPLEMENTS Dinner, Breakfast; Gelatein 20  DIET ONE TIME MESSAGE  DIET REGULAR  Additional Caloric Sources:      Meal intake:   Patient Vitals for the past 168 hrs:   % Diet Eaten   04/06/21 1500 25 %   04/02/21 1925 30 %   04/02/21 0140 0 %   04/01/21 1945 0 %   04/01/21 0915 80 %   04/01/21 0600 0 %       Anthropometric Measures:  · Height:  5' 3\" (160 cm)  · Current Body Wt:  111.3 kg (245 lb 6 oz)   · Admission Body Wt:  309 lb 8.4 oz    · Usual Body Wt:        · Ideal Body Wt:  115:  213.4 %   · BMI Categories:  Obese class 3 (BMI 40.0 or greater)    Body mass index is 43.47 kg/m².      Wt Readings from Last 10 Encounters:   04/04/21 111.3 kg (245 lb 6 oz)     Nutrition Diagnosis: · Increased nutrient needs related to catabolic illness, impaired respiratory function as evidenced by weight loss from admit wt reported, +Covid-19 status, clears or NPO for several days to protect airway.      Nutrition Interventions:   Food and/or Nutrient Delivery: Continue current diet, Modify oral nutrition supplement  Nutrition Education and Counseling: No recommendations at this time  Coordination of Nutrition Care: Continue to monitor while inpatient, Interdisciplinary rounds    Goals:  tolerance of >50% of meals and ONS over the next 3-5 days       Nutrition Monitoring and Evaluation:   Behavioral-Environmental Outcomes: None identified  Food/Nutrient Intake Outcomes: Diet advancement/tolerance, Supplement intake  Physical Signs/Symptoms Outcomes: Biochemical data, Weight, Hemodynamic status    Discharge Planning:    No discharge needs at this time   Continue supplements beyond discharge to promote healing      Harriet Fabian RD, MS  Contact via [employer] or office [phone]

## 2021-04-07 NOTE — PROGRESS NOTES
Pharmacy Dosing Services:     Protonix changed from 40 mg IV daily to 40 mg PO daily per P&T protocol.  - Diet: Regular  - Indication:GERD  - Takes PO meds       Thank you,  Diana Augustin, PharmD

## 2021-04-08 LAB
ALBUMIN SERPL-MCNC: 3.4 G/DL (ref 3.5–5)
ALBUMIN/GLOB SERPL: 0.9 {RATIO} (ref 1.1–2.2)
ALP SERPL-CCNC: 61 U/L (ref 45–117)
ALT SERPL-CCNC: 88 U/L (ref 12–78)
ANION GAP SERPL CALC-SCNC: 5 MMOL/L (ref 5–15)
AST SERPL-CCNC: 38 U/L (ref 15–37)
BASOPHILS # BLD: 0 K/UL (ref 0–0.1)
BASOPHILS NFR BLD: 0 % (ref 0–1)
BILIRUB SERPL-MCNC: 0.5 MG/DL (ref 0.2–1)
BUN SERPL-MCNC: 16 MG/DL (ref 6–20)
BUN/CREAT SERPL: 23 (ref 12–20)
CALCIUM SERPL-MCNC: 8.7 MG/DL (ref 8.5–10.1)
CHLORIDE SERPL-SCNC: 105 MMOL/L (ref 97–108)
CO2 SERPL-SCNC: 30 MMOL/L (ref 21–32)
CREAT SERPL-MCNC: 0.71 MG/DL (ref 0.55–1.02)
CRP SERPL-MCNC: 0.89 MG/DL (ref 0–0.6)
D DIMER PPP FEU-MCNC: 0.75 MG/L FEU (ref 0–0.65)
DIFFERENTIAL METHOD BLD: ABNORMAL
EOSINOPHIL # BLD: 0.2 K/UL (ref 0–0.4)
EOSINOPHIL NFR BLD: 4 % (ref 0–7)
ERYTHROCYTE [DISTWIDTH] IN BLOOD BY AUTOMATED COUNT: 13.9 % (ref 11.5–14.5)
FERRITIN SERPL-MCNC: 270 NG/ML (ref 26–388)
GLOBULIN SER CALC-MCNC: 3.6 G/DL (ref 2–4)
GLUCOSE BLD STRIP.AUTO-MCNC: 124 MG/DL (ref 65–100)
GLUCOSE BLD STRIP.AUTO-MCNC: 134 MG/DL (ref 65–100)
GLUCOSE BLD STRIP.AUTO-MCNC: 83 MG/DL (ref 65–100)
GLUCOSE SERPL-MCNC: 77 MG/DL (ref 65–100)
HCT VFR BLD AUTO: 37.5 % (ref 35–47)
HGB BLD-MCNC: 12 G/DL (ref 11.5–16)
IMM GRANULOCYTES # BLD AUTO: 0 K/UL
IMM GRANULOCYTES NFR BLD AUTO: 0 %
LYMPHOCYTES # BLD: 1.7 K/UL (ref 0.8–3.5)
LYMPHOCYTES NFR BLD: 29 % (ref 12–49)
MAGNESIUM SERPL-MCNC: 2.3 MG/DL (ref 1.6–2.4)
MCH RBC QN AUTO: 26.5 PG (ref 26–34)
MCHC RBC AUTO-ENTMCNC: 32 G/DL (ref 30–36.5)
MCV RBC AUTO: 82.8 FL (ref 80–99)
MONOCYTES # BLD: 0.4 K/UL (ref 0–1)
MONOCYTES NFR BLD: 7 % (ref 5–13)
MYELOCYTES NFR BLD MANUAL: 2 %
NEUTS BAND NFR BLD MANUAL: 3 % (ref 0–6)
NEUTS SEG # BLD: 3.3 K/UL (ref 1.8–8)
NEUTS SEG NFR BLD: 55 % (ref 32–75)
NRBC # BLD: 0 K/UL (ref 0–0.01)
NRBC BLD-RTO: 0 PER 100 WBC
PHOSPHATE SERPL-MCNC: 2.9 MG/DL (ref 2.6–4.7)
PLATELET # BLD AUTO: 339 K/UL (ref 150–400)
PLATELET COMMENTS,PCOM: ABNORMAL
PMV BLD AUTO: 9.8 FL (ref 8.9–12.9)
POTASSIUM SERPL-SCNC: 3.7 MMOL/L (ref 3.5–5.1)
PROCALCITONIN SERPL-MCNC: 0.06 NG/ML
PROT SERPL-MCNC: 7 G/DL (ref 6.4–8.2)
RBC # BLD AUTO: 4.53 M/UL (ref 3.8–5.2)
RBC MORPH BLD: ABNORMAL
SERVICE CMNT-IMP: ABNORMAL
SERVICE CMNT-IMP: ABNORMAL
SERVICE CMNT-IMP: NORMAL
SODIUM SERPL-SCNC: 140 MMOL/L (ref 136–145)
WBC # BLD AUTO: 5.7 K/UL (ref 3.6–11)

## 2021-04-08 PROCEDURE — 74011250636 HC RX REV CODE- 250/636: Performed by: INTERNAL MEDICINE

## 2021-04-08 PROCEDURE — 83735 ASSAY OF MAGNESIUM: CPT

## 2021-04-08 PROCEDURE — 94664 DEMO&/EVAL PT USE INHALER: CPT

## 2021-04-08 PROCEDURE — 74011250637 HC RX REV CODE- 250/637: Performed by: INTERNAL MEDICINE

## 2021-04-08 PROCEDURE — 85025 COMPLETE CBC W/AUTO DIFF WBC: CPT

## 2021-04-08 PROCEDURE — 94640 AIRWAY INHALATION TREATMENT: CPT

## 2021-04-08 PROCEDURE — 82962 GLUCOSE BLOOD TEST: CPT

## 2021-04-08 PROCEDURE — 74011000250 HC RX REV CODE- 250: Performed by: NURSE PRACTITIONER

## 2021-04-08 PROCEDURE — 65660000000 HC RM CCU STEPDOWN

## 2021-04-08 PROCEDURE — 86140 C-REACTIVE PROTEIN: CPT

## 2021-04-08 PROCEDURE — 77010033678 HC OXYGEN DAILY

## 2021-04-08 PROCEDURE — 77010033711 HC HIGH FLOW OXYGEN

## 2021-04-08 PROCEDURE — 84100 ASSAY OF PHOSPHORUS: CPT

## 2021-04-08 PROCEDURE — 80053 COMPREHEN METABOLIC PANEL: CPT

## 2021-04-08 PROCEDURE — 82728 ASSAY OF FERRITIN: CPT

## 2021-04-08 PROCEDURE — 85379 FIBRIN DEGRADATION QUANT: CPT

## 2021-04-08 PROCEDURE — 84145 PROCALCITONIN (PCT): CPT

## 2021-04-08 PROCEDURE — 36415 COLL VENOUS BLD VENIPUNCTURE: CPT

## 2021-04-08 PROCEDURE — 74011250636 HC RX REV CODE- 250/636: Performed by: NURSE PRACTITIONER

## 2021-04-08 RX ADMIN — ENOXAPARIN SODIUM 60 MG: 60 INJECTION SUBCUTANEOUS at 20:06

## 2021-04-08 RX ADMIN — ROSUVASTATIN CALCIUM 20 MG: 10 TABLET, COATED ORAL at 08:16

## 2021-04-08 RX ADMIN — IPRATROPIUM BROMIDE AND ALBUTEROL 1 PUFF: 20; 100 SPRAY, METERED RESPIRATORY (INHALATION) at 15:17

## 2021-04-08 RX ADMIN — CEFTRIAXONE 2 G: 2 INJECTION, POWDER, FOR SOLUTION INTRAMUSCULAR; INTRAVENOUS at 11:16

## 2021-04-08 RX ADMIN — IPRATROPIUM BROMIDE AND ALBUTEROL 1 PUFF: 20; 100 SPRAY, METERED RESPIRATORY (INHALATION) at 07:33

## 2021-04-08 RX ADMIN — PANTOPRAZOLE SODIUM 40 MG: 40 TABLET, DELAYED RELEASE ORAL at 08:16

## 2021-04-08 RX ADMIN — AZITHROMYCIN MONOHYDRATE 500 MG: 500 INJECTION, POWDER, LYOPHILIZED, FOR SOLUTION INTRAVENOUS at 11:17

## 2021-04-08 RX ADMIN — IPRATROPIUM BROMIDE AND ALBUTEROL 1 PUFF: 20; 100 SPRAY, METERED RESPIRATORY (INHALATION) at 20:06

## 2021-04-08 RX ADMIN — GUAIFENESIN 1200 MG: 600 TABLET, EXTENDED RELEASE ORAL at 08:16

## 2021-04-08 RX ADMIN — ACETAMINOPHEN 650 MG: 325 TABLET, FILM COATED ORAL at 08:23

## 2021-04-08 RX ADMIN — GUAIFENESIN 1200 MG: 600 TABLET, EXTENDED RELEASE ORAL at 17:10

## 2021-04-08 RX ADMIN — Medication 50 MG: at 08:16

## 2021-04-08 RX ADMIN — BUDESONIDE AND FORMOTEROL FUMARATE DIHYDRATE 2 PUFF: 160; 4.5 AEROSOL RESPIRATORY (INHALATION) at 20:06

## 2021-04-08 RX ADMIN — DEXAMETHASONE SODIUM PHOSPHATE 6 MG: 4 INJECTION, SOLUTION INTRAMUSCULAR; INTRAVENOUS at 11:16

## 2021-04-08 RX ADMIN — ENOXAPARIN SODIUM 60 MG: 60 INJECTION SUBCUTANEOUS at 08:16

## 2021-04-08 RX ADMIN — Medication 10 ML: at 05:40

## 2021-04-08 RX ADMIN — OXYCODONE HYDROCHLORIDE AND ACETAMINOPHEN 500 MG: 500 TABLET ORAL at 08:16

## 2021-04-08 RX ADMIN — Medication 10 ML: at 20:06

## 2021-04-08 RX ADMIN — Medication 10 ML: at 14:00

## 2021-04-08 RX ADMIN — BUDESONIDE AND FORMOTEROL FUMARATE DIHYDRATE 2 PUFF: 160; 4.5 AEROSOL RESPIRATORY (INHALATION) at 07:50

## 2021-04-08 RX ADMIN — LEVOTHYROXINE SODIUM 88 MCG: 0.09 TABLET ORAL at 05:39

## 2021-04-08 NOTE — PROGRESS NOTES
TRANSFER - IN REPORT:    Verbal report received from [address] RN(name) on Briseyda Colmenares  being received from ICU(unit) for routine progression of care      Report consisted of patients Situation, Background, Assessment and   Recommendations(SBAR). Information from the following report(s) SBAR, Kardex, ED Summary, Procedure Summary, Intake/Output, MAR, Recent Results, Cardiac Rhythm NSR, Alarm Parameters  and Quality Measures was reviewed with the receiving nurse. Opportunity for questions and clarification was provided. Assessment completed upon patients arrival to unit and care assumed.

## 2021-04-08 NOTE — PROGRESS NOTES
Bedside and Verbal shift change report given to Esther Alcantar (oncoming nurse) by Ghazal Menchaca RN (offgoing nurse). Report included the following information SBAR, Kardex, ED Summary, Procedure Summary, Intake/Output, MAR, Accordion, Recent Results, Med Rec Status, Cardiac Rhythm NSR, Alarm Parameters  and Quality Measures.

## 2021-04-08 NOTE — PROGRESS NOTES
0700- Verbal shift change report given to Zarina Saravia RN (oncoming nurse) by Zarina Saravia RN (offgoing nurse). Report included the following information SBAR, Kardex, Intake/Output and Cardiac Rhythm NSR. Primary Nurse Shaylee Nevarez RN and Gerson Baltazar RN performed a dual skin assessment on this patient No impairment noted. 0800- Assessment completed, see doc flowsheet. Pt assisted to reposition in chair. O2 weaned to [facility].     1200- Reassessment completed, see doc flowsheet. Pt still tolerating 5LNC.

## 2021-04-08 NOTE — PROGRESS NOTES
1900- Bedside and Verbal shift change report given to Joyce Marc (oncoming nurse) by Dick Freeman (offgoing nurse). Report included the following information SBAR, Intake/Output, Recent Results and Cardiac Rhythm NSR, SB. This patient was assisted with Intentional Toileting every 2 hours during this shift. Documentation of ambulation and output reflected on Flowsheet. 0730- Bedside and Verbal shift change report given to Kiko Emmanuel (oncoming nurse) by Joyce Marc (offgoing nurse).  Report included the following information SBAR, Intake/Output, Recent Results and Cardiac Rhythm SB.

## 2021-04-08 NOTE — PROGRESS NOTES
Andrés Reyna [facility]  [address]  [phone]      Medical Progress Note      NAME: Graham Pulliam   :  1990  MRM:  [id number]    Date/Time: 2021        Assessment / Plan:     28 yo F w/ hx of asthma, morbid obesity admitted for AHRF 2/2 COVID-19    Acute respiratory failure with hypoxia: 2/2 COVID-19 PNA. Improving steadily. Now on 4L NC. Wean O2 as tolerated     Pneumonia due to COVID-19 virus: s/p remdesivir, Actemra, and convalescent plasma. On dexamethasone     Asthma: no wheezing. Cont Combivent, albuterol PRN     Thrombocytopenia: likely 2/2 viral process. Resolved      Hyperlipidemia: cont statin      Acquired hypothyroidism: cont LT4     Anxiety and depression: cont Ativan PRN      Obesity: Body mass index is 43.47 kg/m². Risk factor for severe disease for COVID. Would benefit from weight loss and dietary / lifestyle modifications     Bradycardia: better off Precedex. Monitor         Total time spent: 35 minutes  Time spent in the care of this patient including reviewing records, discussing with nursing and/or other providers on the treatment team, obtaining history and examining the patient, and discussing treatment plans. Care Plan discussed with: Patient, Nursing Staff and >50% of time spent in counseling and coordination of care    Discussed:  Care Plan and D/C Planning    Prophylaxis:  Lovenox    Disposition:  Home w/Family         Subjective:     Chief Complaint:  Follow up COVID    Chart/notes/labs/studies reviewed, patient examined. Feels better. No CP. Mild SOB. No fevers            Objective:       Vitals:        Last 24hrs VS reviewed since prior progress note.  Most recent are:    Visit Vitals  /81   Pulse (!) 47   Temp 97.9 °F (36.6 °C)   Resp 15   Ht 5' 3\" (1.6 m)   Wt 111.3 kg (245 lb 6 oz)   SpO2 98%   BMI 43.47 kg/m²     SpO2 Readings from Last 6 Encounters:   21 98%    O2 Flow Rate (L/min): 5 l/min       Intake/Output Summary (Last 24 hours) at 4/8/2021 0820  Last data filed at 4/7/2021 1800  Gross per 24 hour   Intake    Output 650 ml   Net -650 ml          Exam:     Physical Exam:    Gen:  Obese. Well-developed, well-nourished, in no acute distress  HEENT:  Atraumatic, normocephalic. Sclerae nonicteric, hearing intact to voice  Neck:  Supple, without apparent masses. Resp:  No accessory muscle use, mild increase in WOB. Mildly diminished without wheezes, rales, or rhonchi  Card: RRR, without m/r/g. No LE edema  Abd:  +bowel sounds, soft, NTTP, nondistended. No HSM  Neuro: Face symmetric, speech fluent, follows commands appropriately, no focal weakness or numbness  Psych:  Alert, oriented x 3.  Good insight     Medications Reviewed: (see below)    Lab Data Reviewed: (see below)    ______________________________________________________________________    Medications:     Current Facility-Administered Medications   Medication Dose Route Frequency    pantoprazole (PROTONIX) tablet 40 mg  40 mg Oral ACB    levothyroxine (SYNTHROID) tablet 88 mcg  88 mcg Oral 6am    glucose chewable tablet 16 g  4 Tab Oral PRN    dextrose (D50W) injection syrg 12.5-25 g  12.5-25 g IntraVENous PRN    glucagon (GLUCAGEN) injection 1 mg  1 mg IntraMUSCular PRN    insulin lispro (HUMALOG) injection   SubCUTAneous Q6H    albuterol (PROVENTIL HFA, VENTOLIN HFA, PROAIR HFA) inhaler 2 Puff  2 Puff Inhalation Q4H PRN    Or    albuterol (PROVENTIL VENTOLIN) nebulizer solution 2.5 mg  2.5 mg Nebulization Q4H PRN    budesonide-formoteroL (SYMBICORT) 160-4.5 mcg/actuation HFA inhaler 2 Puff  2 Puff Inhalation BID RT    Or    arformoterol 15 mcg/budesonide 0.5 mg neb solution   Nebulization BID RT    ipratropium-albuterol (COMBIVENT RESPIMAT) 20 mcg-100 mcg inhalation spray  1 Puff Inhalation Q6HWA RT    Or    albuterol-ipratropium (DUO-NEB) 2.5 MG-0.5 MG/3 ML  3 mL Nebulization Q6HWA RT    ipratropium-albuterol (COMBIVENT RESPIMAT) 20 mcg-100 mcg inhalation spray  1 Puff Inhalation Q4H PRN    Or    albuterol-ipratropium (DUO-NEB) 2.5 MG-0.5 MG/3 ML  3 mL Nebulization Q4H PRN    LORazepam (ATIVAN) injection 0.5 mg  0.5 mg IntraVENous Q6H PRN    azithromycin (ZITHROMAX) 500 mg in 0.9% sodium chloride 250 mL (VIAL-MATE)  500 mg IntraVENous Q24H    cefTRIAXone (ROCEPHIN) 2 g in sterile water (preservative free) 20 mL IV syringe  2 g IntraVENous Q24H    dexamethasone (DECADRON) 4 mg/mL injection 6 mg  6 mg IntraVENous Q24H    0.9% sodium chloride infusion 250 mL  250 mL IntraVENous PRN    morphine injection 2 mg  2 mg IntraVENous Q4H PRN    zolpidem (AMBIEN) tablet 5 mg  5 mg Oral QHS PRN    0.9% sodium chloride infusion 250 mL  250 mL IntraVENous PRN    hydrOXYzine HCL (ATARAX) tablet 10 mg  10 mg Oral TID PRN    HYDROcodone-chlorpheniramine (TUSSIONEX) oral suspension 5 mL  5 mL Oral Q12H PRN    guaiFENesin ER (MUCINEX) tablet 1,200 mg  1,200 mg Oral BID    enoxaparin (LOVENOX) injection 60 mg  60 mg SubCUTAneous Q12H    benzonatate (TESSALON) capsule 100 mg  100 mg Oral TID PRN    guaiFENesin-dextromethorphan (ROBITUSSIN DM) 100-10 mg/5 mL syrup 5 mL  5 mL Oral Q6H PRN    morphine IR (MS IR) tablet 15 mg  15 mg Oral Q4H PRN    rosuvastatin (CRESTOR) tablet 20 mg  20 mg Oral DAILY    sodium chloride (NS) flush 5-40 mL  5-40 mL IntraVENous Q8H    sodium chloride (NS) flush 5-40 mL  5-40 mL IntraVENous PRN    acetaminophen (TYLENOL) tablet 650 mg  650 mg Oral Q6H PRN    Or    acetaminophen (TYLENOL) suppository 650 mg  650 mg Rectal Q6H PRN    polyethylene glycol (MIRALAX) packet 17 g  17 g Oral DAILY PRN    promethazine (PHENERGAN) tablet 12.5 mg  12.5 mg Oral Q6H PRN    Or    ondansetron (ZOFRAN) injection 4 mg  4 mg IntraVENous Q6H PRN    ascorbic acid (vitamin C) (VITAMIN C) tablet 500 mg  500 mg Oral DAILY    zinc gluconate tablet 50 mg  50 mg Oral DAILY            Lab Review:     Recent Labs 04/08/21  0533 04/07/21  0319 04/06/21  0303   WBC 5.7 4.1 4.4   HGB 12.0 11.4* 11.5   HCT 37.5 35.3 37.6    307 281     Recent Labs     04/08/21  0533 04/07/21 0319 04/06/21  0303    139 141   K 3.7 3.2* 3.7    104 106   CO2 30 32 30   GLU 77 74 85   BUN 16 22* 18   CREA 0.71 0.78 0.72   CA 8.7 8.3* 8.2*   MG 2.3  --   --    PHOS 2.9  --   --    ALB 3.4* 3.1* 3.1*   ALT 88* 89* 85*     No components found for: GLPOC  No results for input(s): PH, PCO2, PO2, HCO3, FIO2 in the last 72 hours. No results for input(s): INR, INREXT in the last 72 hours.   No results found for: SDES  Lab Results   Component Value Date/Time    Culture result: No growth (<1,000 CFU/ML) 04/04/2021 03:28 PM              ___________________________________________________    Attending Physician: Teri Schaumann, MD

## 2021-04-09 LAB
ALBUMIN SERPL-MCNC: 3.5 G/DL (ref 3.5–5)
ALBUMIN/GLOB SERPL: 1 {RATIO} (ref 1.1–2.2)
ALP SERPL-CCNC: 60 U/L (ref 45–117)
ALT SERPL-CCNC: 134 U/L (ref 12–78)
ANION GAP SERPL CALC-SCNC: 6 MMOL/L (ref 5–15)
AST SERPL-CCNC: 76 U/L (ref 15–37)
BASOPHILS # BLD: 0 K/UL (ref 0–0.1)
BASOPHILS NFR BLD: 0 % (ref 0–1)
BILIRUB SERPL-MCNC: 0.6 MG/DL (ref 0.2–1)
BUN SERPL-MCNC: 16 MG/DL (ref 6–20)
BUN/CREAT SERPL: 25 (ref 12–20)
CALCIUM SERPL-MCNC: 8.7 MG/DL (ref 8.5–10.1)
CHLORIDE SERPL-SCNC: 105 MMOL/L (ref 97–108)
CO2 SERPL-SCNC: 29 MMOL/L (ref 21–32)
COMMENT, HOLDF: NORMAL
CREAT SERPL-MCNC: 0.64 MG/DL (ref 0.55–1.02)
CRP SERPL-MCNC: 0.6 MG/DL (ref 0–0.6)
D DIMER PPP FEU-MCNC: 0.56 MG/L FEU (ref 0–0.65)
DIFFERENTIAL METHOD BLD: ABNORMAL
EOSINOPHIL # BLD: 0.1 K/UL (ref 0–0.4)
EOSINOPHIL NFR BLD: 1 % (ref 0–7)
ERYTHROCYTE [DISTWIDTH] IN BLOOD BY AUTOMATED COUNT: 13.9 % (ref 11.5–14.5)
GLOBULIN SER CALC-MCNC: 3.5 G/DL (ref 2–4)
GLUCOSE BLD STRIP.AUTO-MCNC: 109 MG/DL (ref 65–100)
GLUCOSE BLD STRIP.AUTO-MCNC: 116 MG/DL (ref 65–100)
GLUCOSE BLD STRIP.AUTO-MCNC: 148 MG/DL (ref 65–100)
GLUCOSE BLD STRIP.AUTO-MCNC: 81 MG/DL (ref 65–100)
GLUCOSE BLD STRIP.AUTO-MCNC: 95 MG/DL (ref 65–100)
GLUCOSE SERPL-MCNC: 80 MG/DL (ref 65–100)
HCT VFR BLD AUTO: 37.9 % (ref 35–47)
HGB BLD-MCNC: 11.9 G/DL (ref 11.5–16)
IMM GRANULOCYTES # BLD AUTO: 0 K/UL
IMM GRANULOCYTES NFR BLD AUTO: 0 %
LYMPHOCYTES # BLD: 1.5 K/UL (ref 0.8–3.5)
LYMPHOCYTES NFR BLD: 28 % (ref 12–49)
MAGNESIUM SERPL-MCNC: 2.4 MG/DL (ref 1.6–2.4)
MCH RBC QN AUTO: 26.4 PG (ref 26–34)
MCHC RBC AUTO-ENTMCNC: 31.4 G/DL (ref 30–36.5)
MCV RBC AUTO: 84.2 FL (ref 80–99)
MONOCYTES # BLD: 0.4 K/UL (ref 0–1)
MONOCYTES NFR BLD: 8 % (ref 5–13)
MYELOCYTES NFR BLD MANUAL: 1 %
NEUTS BAND NFR BLD MANUAL: 8 % (ref 0–6)
NEUTS SEG # BLD: 3.4 K/UL (ref 1.8–8)
NEUTS SEG NFR BLD: 54 % (ref 32–75)
NRBC # BLD: 0 K/UL (ref 0–0.01)
NRBC BLD-RTO: 0 PER 100 WBC
PLATELET # BLD AUTO: 322 K/UL (ref 150–400)
PMV BLD AUTO: 10.2 FL (ref 8.9–12.9)
POTASSIUM SERPL-SCNC: 3.7 MMOL/L (ref 3.5–5.1)
PROCALCITONIN SERPL-MCNC: <0.05 NG/ML
PROT SERPL-MCNC: 7 G/DL (ref 6.4–8.2)
RBC # BLD AUTO: 4.5 M/UL (ref 3.8–5.2)
RBC MORPH BLD: ABNORMAL
SAMPLES BEING HELD,HOLD: NORMAL
SERVICE CMNT-IMP: ABNORMAL
SERVICE CMNT-IMP: NORMAL
SERVICE CMNT-IMP: NORMAL
SODIUM SERPL-SCNC: 140 MMOL/L (ref 136–145)
WBC # BLD AUTO: 5.5 K/UL (ref 3.6–11)

## 2021-04-09 PROCEDURE — 84145 PROCALCITONIN (PCT): CPT

## 2021-04-09 PROCEDURE — 74011636637 HC RX REV CODE- 636/637: Performed by: INTERNAL MEDICINE

## 2021-04-09 PROCEDURE — 80053 COMPREHEN METABOLIC PANEL: CPT

## 2021-04-09 PROCEDURE — 65660000000 HC RM CCU STEPDOWN

## 2021-04-09 PROCEDURE — 77010033678 HC OXYGEN DAILY

## 2021-04-09 PROCEDURE — 36415 COLL VENOUS BLD VENIPUNCTURE: CPT

## 2021-04-09 PROCEDURE — 85379 FIBRIN DEGRADATION QUANT: CPT

## 2021-04-09 PROCEDURE — 94640 AIRWAY INHALATION TREATMENT: CPT

## 2021-04-09 PROCEDURE — 86140 C-REACTIVE PROTEIN: CPT

## 2021-04-09 PROCEDURE — 82962 GLUCOSE BLOOD TEST: CPT

## 2021-04-09 PROCEDURE — 74011250637 HC RX REV CODE- 250/637: Performed by: INTERNAL MEDICINE

## 2021-04-09 PROCEDURE — 74011250636 HC RX REV CODE- 250/636: Performed by: INTERNAL MEDICINE

## 2021-04-09 PROCEDURE — 94664 DEMO&/EVAL PT USE INHALER: CPT

## 2021-04-09 PROCEDURE — 83735 ASSAY OF MAGNESIUM: CPT

## 2021-04-09 PROCEDURE — 85025 COMPLETE CBC W/AUTO DIFF WBC: CPT

## 2021-04-09 RX ADMIN — OXYCODONE HYDROCHLORIDE AND ACETAMINOPHEN 500 MG: 500 TABLET ORAL at 09:01

## 2021-04-09 RX ADMIN — Medication 50 MG: at 09:01

## 2021-04-09 RX ADMIN — GUAIFENESIN AND DEXTROMETHORPHAN 5 ML: 100; 10 SYRUP ORAL at 17:53

## 2021-04-09 RX ADMIN — ENOXAPARIN SODIUM 60 MG: 60 INJECTION SUBCUTANEOUS at 20:23

## 2021-04-09 RX ADMIN — LEVOTHYROXINE SODIUM 88 MCG: 0.09 TABLET ORAL at 05:58

## 2021-04-09 RX ADMIN — ACETAMINOPHEN 650 MG: 325 TABLET, FILM COATED ORAL at 09:01

## 2021-04-09 RX ADMIN — GUAIFENESIN 1200 MG: 600 TABLET, EXTENDED RELEASE ORAL at 09:01

## 2021-04-09 RX ADMIN — BUDESONIDE AND FORMOTEROL FUMARATE DIHYDRATE 2 PUFF: 160; 4.5 AEROSOL RESPIRATORY (INHALATION) at 20:19

## 2021-04-09 RX ADMIN — INSULIN LISPRO 2 UNITS: 100 INJECTION, SOLUTION INTRAVENOUS; SUBCUTANEOUS at 17:52

## 2021-04-09 RX ADMIN — IPRATROPIUM BROMIDE AND ALBUTEROL 1 PUFF: 20; 100 SPRAY, METERED RESPIRATORY (INHALATION) at 13:33

## 2021-04-09 RX ADMIN — DEXAMETHASONE SODIUM PHOSPHATE 6 MG: 4 INJECTION, SOLUTION INTRAMUSCULAR; INTRAVENOUS at 11:52

## 2021-04-09 RX ADMIN — Medication 10 ML: at 17:53

## 2021-04-09 RX ADMIN — ENOXAPARIN SODIUM 60 MG: 60 INJECTION SUBCUTANEOUS at 09:01

## 2021-04-09 RX ADMIN — PANTOPRAZOLE SODIUM 40 MG: 40 TABLET, DELAYED RELEASE ORAL at 05:58

## 2021-04-09 RX ADMIN — IPRATROPIUM BROMIDE AND ALBUTEROL 1 PUFF: 20; 100 SPRAY, METERED RESPIRATORY (INHALATION) at 07:25

## 2021-04-09 RX ADMIN — IPRATROPIUM BROMIDE AND ALBUTEROL 1 PUFF: 20; 100 SPRAY, METERED RESPIRATORY (INHALATION) at 20:19

## 2021-04-09 RX ADMIN — Medication 10 ML: at 20:23

## 2021-04-09 RX ADMIN — Medication 10 ML: at 05:58

## 2021-04-09 RX ADMIN — BUDESONIDE AND FORMOTEROL FUMARATE DIHYDRATE 2 PUFF: 160; 4.5 AEROSOL RESPIRATORY (INHALATION) at 07:34

## 2021-04-09 RX ADMIN — GUAIFENESIN 1200 MG: 600 TABLET, EXTENDED RELEASE ORAL at 17:53

## 2021-04-09 NOTE — PROGRESS NOTES
Bedside and Verbal shift change report given to Kiko Emmanuel (oncoming nurse) by Jeff Sanchez (offgoing nurse). Report included the following information SBAR, Kardex, Procedure Summary, Intake/Output and MAR.

## 2021-04-09 NOTE — PROGRESS NOTES
Care Management follow up, LOS 8 days    Patient admitted for COVID pneumonia. Hx asthma, hyperlipidemia, anxiety, depression, hypothyroid, obesity. RUR score 17%/ low risk    Current status  Patient transferred from ICU yesterday to [facility]. Continues to require medical management including oxygen at 4L/nc and IV antibiotics. Patient lives in [place], here assisting family with clearing out her grandmothers home. Transition of Care Plan  1. Monitor patient status and response to treatment. 2. Medical management continues. 3. Patient lives in [place], plans to return when able. 4. Parents can transport home at DC  5. Will need home oxygen evaluation prior to DC. 6. CM to monitor progress and recommendations.     Christina Braswell RN, MSN/Care manager

## 2021-04-09 NOTE — PROGRESS NOTES
Andrés Orellana [facility]  [address]  [phone]      Medical Progress Note      NAME: Christy Naqvi   :  1990  MRM:  [id number]    Date/Time: 2021        Assessment / Plan:     28 yo F w/ hx of asthma, hypothyroidism, morbid obesity admitted for AHRF 2/2 COVID-19. Hospital course complicated by problems as listed below:      Acute respiratory failure with hypoxia: 2/2 COVID-19 PNA. Improving steadily and now on 4L NC. Continue to wean O2 if able. AVINASH not on CPAP     Pneumonia due to COVID-19 virus: s/p remdesivir, Actemra, and convalescent plasma. Cont dexamethasone     Asthma: no wheezing. Cont Combivent, albuterol PRN     Thrombocytopenia: likely 2/2 viral process. Resolved     Elevated LFTs: may be 2/2 viral illness. Hold statin     Hyperlipidemia: hold statin      Acquired hypothyroidism: cont LT4     Anxiety and depression: cont Ativan PRN      Obesity: Body mass index is 43.78 kg/m². Risk factor for severe disease for COVID. Would benefit from weight loss and dietary / lifestyle modifications     Bradycardia: better off Precedex. May have AVINASH. Recommend outpatient pulmonology follow up. Monitor         Total time spent: 25 minutes  Time spent in the care of this patient including reviewing records, discussing with nursing and/or other providers on the treatment team, obtaining history and examining the patient, and discussing treatment plans. Care Plan discussed with: Patient, Nursing Staff and >50% of time spent in counseling and coordination of care    Discussed:  Care Plan and D/C Planning    Prophylaxis:  Lovenox    Disposition:  Home w/Family         Subjective:     Chief Complaint:  Follow up COVID    Chart/notes/labs/studies reviewed, patient examined. Feels okay. Denies CP. SOB improving. Wants to go home soon. No F/C          Objective:       Vitals:        Last 24hrs VS reviewed since prior progress note. Most recent are:    Visit Vitals  /78 (BP 1 Location: Left upper arm, BP Patient Position: At rest)   Pulse 73   Temp 98.2 °F (36.8 °C)   Resp 18   Ht 5' 3\" (1.6 m)   Wt 112.1 kg (247 lb 2.2 oz)   SpO2 98%   BMI 43.78 kg/m²     SpO2 Readings from Last 6 Encounters:   04/09/21 98%    O2 Flow Rate (L/min): 4 l/min       Intake/Output Summary (Last 24 hours) at 4/9/2021 1649  Last data filed at 4/9/2021 1516  Gross per 24 hour   Intake 840 ml   Output    Net 840 ml          Exam:     Physical Exam:    Gen:  Obese. Well-developed, well-nourished, in no acute distress. Pleasant   HEENT:  Atraumatic, normocephalic. Sclerae nonicteric, hearing intact to voice  Neck:  Supple, without apparent masses. Resp:  No accessory muscle use, mild increase in WOB. Mildly diminished without wheezes, rales, or rhonchi  Card: HR 60s, without m/r/g. No LE edema  Abd:  +bowel sounds, soft, NTTP, nondistended. No HSM  Neuro: Face symmetric, speech fluent, follows commands appropriately, no focal weakness or numbness  Psych:  Alert, oriented x 3.  Good insight     Medications Reviewed: (see below)    Lab Data Reviewed: (see below)    ______________________________________________________________________    Medications:     Current Facility-Administered Medications   Medication Dose Route Frequency    pantoprazole (PROTONIX) tablet 40 mg  40 mg Oral ACB    levothyroxine (SYNTHROID) tablet 88 mcg  88 mcg Oral 6am    glucose chewable tablet 16 g  4 Tab Oral PRN    dextrose (D50W) injection syrg 12.5-25 g  12.5-25 g IntraVENous PRN    glucagon (GLUCAGEN) injection 1 mg  1 mg IntraMUSCular PRN    insulin lispro (HUMALOG) injection   SubCUTAneous Q6H    albuterol (PROVENTIL HFA, VENTOLIN HFA, PROAIR HFA) inhaler 2 Puff  2 Puff Inhalation Q4H PRN    Or    albuterol (PROVENTIL VENTOLIN) nebulizer solution 2.5 mg  2.5 mg Nebulization Q4H PRN    budesonide-formoteroL (SYMBICORT) 160-4.5 mcg/actuation HFA inhaler 2 Puff  2 Puff Inhalation BID RT    Or    arformoterol 15 mcg/budesonide 0.5 mg neb solution   Nebulization BID RT    ipratropium-albuterol (COMBIVENT RESPIMAT) 20 mcg-100 mcg inhalation spray  1 Puff Inhalation Q6HWA RT    Or    albuterol-ipratropium (DUO-NEB) 2.5 MG-0.5 MG/3 ML  3 mL Nebulization Q6HWA RT    ipratropium-albuterol (COMBIVENT RESPIMAT) 20 mcg-100 mcg inhalation spray  1 Puff Inhalation Q4H PRN    Or    albuterol-ipratropium (DUO-NEB) 2.5 MG-0.5 MG/3 ML  3 mL Nebulization Q4H PRN    LORazepam (ATIVAN) injection 0.5 mg  0.5 mg IntraVENous Q6H PRN    dexamethasone (DECADRON) 4 mg/mL injection 6 mg  6 mg IntraVENous Q24H    0.9% sodium chloride infusion 250 mL  250 mL IntraVENous PRN    morphine injection 2 mg  2 mg IntraVENous Q4H PRN    zolpidem (AMBIEN) tablet 5 mg  5 mg Oral QHS PRN    0.9% sodium chloride infusion 250 mL  250 mL IntraVENous PRN    hydrOXYzine HCL (ATARAX) tablet 10 mg  10 mg Oral TID PRN    HYDROcodone-chlorpheniramine (TUSSIONEX) oral suspension 5 mL  5 mL Oral Q12H PRN    guaiFENesin ER (MUCINEX) tablet 1,200 mg  1,200 mg Oral BID    enoxaparin (LOVENOX) injection 60 mg  60 mg SubCUTAneous Q12H    benzonatate (TESSALON) capsule 100 mg  100 mg Oral TID PRN    guaiFENesin-dextromethorphan (ROBITUSSIN DM) 100-10 mg/5 mL syrup 5 mL  5 mL Oral Q6H PRN    morphine IR (MS IR) tablet 15 mg  15 mg Oral Q4H PRN    [Held by provider] rosuvastatin (CRESTOR) tablet 20 mg  20 mg Oral DAILY    sodium chloride (NS) flush 5-40 mL  5-40 mL IntraVENous Q8H    sodium chloride (NS) flush 5-40 mL  5-40 mL IntraVENous PRN    acetaminophen (TYLENOL) tablet 650 mg  650 mg Oral Q6H PRN    Or    acetaminophen (TYLENOL) suppository 650 mg  650 mg Rectal Q6H PRN    polyethylene glycol (MIRALAX) packet 17 g  17 g Oral DAILY PRN    promethazine (PHENERGAN) tablet 12.5 mg  12.5 mg Oral Q6H PRN    Or    ondansetron (ZOFRAN) injection 4 mg  4 mg IntraVENous Q6H PRN    ascorbic acid (vitamin C) (VITAMIN C) tablet 500 mg  500 mg Oral DAILY    zinc gluconate tablet 50 mg  50 mg Oral DAILY            Lab Review:     Recent Labs     04/09/21 0332 04/08/21 0533 04/07/21 0319   WBC 5.5 5.7 4.1   HGB 11.9 12.0 11.4*   HCT 37.9 37.5 35.3    339 307     Recent Labs     04/09/21 0332 04/08/21 0533 04/07/21 0319    140 139   K 3.7 3.7 3.2*    105 104   CO2 29 30 32   GLU 80 77 74   BUN 16 16 22*   CREA 0.64 0.71 0.78   CA 8.7 8.7 8.3*   MG 2.4 2.3  --    PHOS  --  2.9  --    ALB 3.5 3.4* 3.1*   * 88* 89*     No components found for: GLPOC  No results for input(s): PH, PCO2, PO2, HCO3, FIO2 in the last 72 hours. No results for input(s): INR, INREXT, INREXT in the last 72 hours.   No results found for: SDES  Lab Results   Component Value Date/Time    Culture result: No growth (<1,000 CFU/ML) 04/04/2021 03:28 PM              ___________________________________________________    Attending Physician: Shira Frias MD

## 2021-04-09 NOTE — PROGRESS NOTES
1930- Bedside and Verbal shift change report given to Cornelio Goltz (oncoming nurse) by Jann Gonzalez (offgoing nurse). Report included the following information SBAR, Intake/Output, Recent Results and Cardiac Rhythm SB. This patient was assisted with Intentional Toileting every 2 hours during this shift. Documentation of ambulation and output reflected on Flowsheet. 0730- Bedside and Verbal shift change report given to Goldie (oncoming nurse) by Cornelio Goltz (offgoing nurse).  Report included the following information SBAR, Intake/Output, Recent Results and Cardiac Rhythm SB.

## 2021-04-10 LAB
ALBUMIN SERPL-MCNC: 3.7 G/DL (ref 3.5–5)
ALBUMIN/GLOB SERPL: 0.9 {RATIO} (ref 1.1–2.2)
ALP SERPL-CCNC: 63 U/L (ref 45–117)
ALT SERPL-CCNC: 171 U/L (ref 12–78)
ANION GAP SERPL CALC-SCNC: 7 MMOL/L (ref 5–15)
AST SERPL-CCNC: 68 U/L (ref 15–37)
BASOPHILS # BLD: 0 K/UL (ref 0–0.1)
BASOPHILS NFR BLD: 0 % (ref 0–1)
BILIRUB SERPL-MCNC: 0.6 MG/DL (ref 0.2–1)
BUN SERPL-MCNC: 16 MG/DL (ref 6–20)
BUN/CREAT SERPL: 23 (ref 12–20)
CALCIUM SERPL-MCNC: 9 MG/DL (ref 8.5–10.1)
CHLORIDE SERPL-SCNC: 105 MMOL/L (ref 97–108)
CO2 SERPL-SCNC: 26 MMOL/L (ref 21–32)
CREAT SERPL-MCNC: 0.71 MG/DL (ref 0.55–1.02)
CRP SERPL-MCNC: 0.37 MG/DL (ref 0–0.6)
D DIMER PPP FEU-MCNC: 0.55 MG/L FEU (ref 0–0.65)
DIFFERENTIAL METHOD BLD: ABNORMAL
EOSINOPHIL # BLD: 0.1 K/UL (ref 0–0.4)
EOSINOPHIL NFR BLD: 1 % (ref 0–7)
ERYTHROCYTE [DISTWIDTH] IN BLOOD BY AUTOMATED COUNT: 14.2 % (ref 11.5–14.5)
GLOBULIN SER CALC-MCNC: 4 G/DL (ref 2–4)
GLUCOSE BLD STRIP.AUTO-MCNC: 102 MG/DL (ref 65–100)
GLUCOSE BLD STRIP.AUTO-MCNC: 113 MG/DL (ref 65–100)
GLUCOSE BLD STRIP.AUTO-MCNC: 129 MG/DL (ref 65–100)
GLUCOSE BLD STRIP.AUTO-MCNC: 173 MG/DL (ref 65–100)
GLUCOSE SERPL-MCNC: 93 MG/DL (ref 65–100)
HCT VFR BLD AUTO: 42 % (ref 35–47)
HGB BLD-MCNC: 13 G/DL (ref 11.5–16)
IMM GRANULOCYTES # BLD AUTO: 0 K/UL
IMM GRANULOCYTES NFR BLD AUTO: 0 %
LYMPHOCYTES # BLD: 2.3 K/UL (ref 0.8–3.5)
LYMPHOCYTES NFR BLD: 38 % (ref 12–49)
MAGNESIUM SERPL-MCNC: 2.5 MG/DL (ref 1.6–2.4)
MCH RBC QN AUTO: 26.6 PG (ref 26–34)
MCHC RBC AUTO-ENTMCNC: 31 G/DL (ref 30–36.5)
MCV RBC AUTO: 86.1 FL (ref 80–99)
MONOCYTES # BLD: 0.2 K/UL (ref 0–1)
MONOCYTES NFR BLD: 3 % (ref 5–13)
MYELOCYTES NFR BLD MANUAL: 1 %
NEUTS BAND NFR BLD MANUAL: 8 % (ref 0–6)
NEUTS SEG # BLD: 3.5 K/UL (ref 1.8–8)
NEUTS SEG NFR BLD: 49 % (ref 32–75)
NRBC # BLD: 0 K/UL (ref 0–0.01)
NRBC BLD-RTO: 0 PER 100 WBC
PHOSPHATE SERPL-MCNC: 3.9 MG/DL (ref 2.6–4.7)
PLATELET # BLD AUTO: 320 K/UL (ref 150–400)
PMV BLD AUTO: 10.2 FL (ref 8.9–12.9)
POTASSIUM SERPL-SCNC: 3.9 MMOL/L (ref 3.5–5.1)
PROT SERPL-MCNC: 7.7 G/DL (ref 6.4–8.2)
RBC # BLD AUTO: 4.88 M/UL (ref 3.8–5.2)
RBC MORPH BLD: ABNORMAL
SERVICE CMNT-IMP: ABNORMAL
SODIUM SERPL-SCNC: 138 MMOL/L (ref 136–145)
WBC # BLD AUTO: 6.1 K/UL (ref 3.6–11)

## 2021-04-10 PROCEDURE — 94640 AIRWAY INHALATION TREATMENT: CPT

## 2021-04-10 PROCEDURE — 74011250637 HC RX REV CODE- 250/637: Performed by: INTERNAL MEDICINE

## 2021-04-10 PROCEDURE — 77010033678 HC OXYGEN DAILY

## 2021-04-10 PROCEDURE — 74011250636 HC RX REV CODE- 250/636: Performed by: INTERNAL MEDICINE

## 2021-04-10 PROCEDURE — 85025 COMPLETE CBC W/AUTO DIFF WBC: CPT

## 2021-04-10 PROCEDURE — 85379 FIBRIN DEGRADATION QUANT: CPT

## 2021-04-10 PROCEDURE — 65270000029 HC RM PRIVATE

## 2021-04-10 PROCEDURE — 83735 ASSAY OF MAGNESIUM: CPT

## 2021-04-10 PROCEDURE — 74011636637 HC RX REV CODE- 636/637: Performed by: INTERNAL MEDICINE

## 2021-04-10 PROCEDURE — 80053 COMPREHEN METABOLIC PANEL: CPT

## 2021-04-10 PROCEDURE — 36415 COLL VENOUS BLD VENIPUNCTURE: CPT

## 2021-04-10 PROCEDURE — 82962 GLUCOSE BLOOD TEST: CPT

## 2021-04-10 PROCEDURE — 86140 C-REACTIVE PROTEIN: CPT

## 2021-04-10 PROCEDURE — 84100 ASSAY OF PHOSPHORUS: CPT

## 2021-04-10 RX ADMIN — GUAIFENESIN 1200 MG: 600 TABLET, EXTENDED RELEASE ORAL at 17:03

## 2021-04-10 RX ADMIN — Medication 10 ML: at 09:19

## 2021-04-10 RX ADMIN — IPRATROPIUM BROMIDE AND ALBUTEROL 1 PUFF: 20; 100 SPRAY, METERED RESPIRATORY (INHALATION) at 14:27

## 2021-04-10 RX ADMIN — BUDESONIDE AND FORMOTEROL FUMARATE DIHYDRATE 2 PUFF: 160; 4.5 AEROSOL RESPIRATORY (INHALATION) at 20:31

## 2021-04-10 RX ADMIN — OXYCODONE HYDROCHLORIDE AND ACETAMINOPHEN 500 MG: 500 TABLET ORAL at 08:17

## 2021-04-10 RX ADMIN — IPRATROPIUM BROMIDE AND ALBUTEROL 1 PUFF: 20; 100 SPRAY, METERED RESPIRATORY (INHALATION) at 07:30

## 2021-04-10 RX ADMIN — GUAIFENESIN 1200 MG: 600 TABLET, EXTENDED RELEASE ORAL at 08:23

## 2021-04-10 RX ADMIN — LEVOTHYROXINE SODIUM 88 MCG: 0.09 TABLET ORAL at 05:16

## 2021-04-10 RX ADMIN — BENZONATATE 100 MG: 100 CAPSULE ORAL at 08:17

## 2021-04-10 RX ADMIN — ACETAMINOPHEN 650 MG: 325 TABLET, FILM COATED ORAL at 17:03

## 2021-04-10 RX ADMIN — BUDESONIDE AND FORMOTEROL FUMARATE DIHYDRATE 2 PUFF: 160; 4.5 AEROSOL RESPIRATORY (INHALATION) at 07:39

## 2021-04-10 RX ADMIN — IPRATROPIUM BROMIDE AND ALBUTEROL 1 PUFF: 20; 100 SPRAY, METERED RESPIRATORY (INHALATION) at 20:25

## 2021-04-10 RX ADMIN — ACETAMINOPHEN 650 MG: 325 TABLET, FILM COATED ORAL at 08:16

## 2021-04-10 RX ADMIN — Medication 10 ML: at 05:03

## 2021-04-10 RX ADMIN — PANTOPRAZOLE SODIUM 40 MG: 40 TABLET, DELAYED RELEASE ORAL at 05:16

## 2021-04-10 RX ADMIN — Medication 50 MG: at 08:16

## 2021-04-10 RX ADMIN — ENOXAPARIN SODIUM 60 MG: 60 INJECTION SUBCUTANEOUS at 20:53

## 2021-04-10 RX ADMIN — ENOXAPARIN SODIUM 60 MG: 60 INJECTION SUBCUTANEOUS at 10:31

## 2021-04-10 RX ADMIN — DEXAMETHASONE SODIUM PHOSPHATE 6 MG: 4 INJECTION, SOLUTION INTRAMUSCULAR; INTRAVENOUS at 12:30

## 2021-04-10 RX ADMIN — INSULIN LISPRO 2 UNITS: 100 INJECTION, SOLUTION INTRAVENOUS; SUBCUTANEOUS at 17:02

## 2021-04-10 RX ADMIN — BENZONATATE 100 MG: 100 CAPSULE ORAL at 17:03

## 2021-04-10 RX ADMIN — Medication 10 ML: at 20:54

## 2021-04-10 NOTE — PROGRESS NOTES
Andrés Orellana [facility] 79  [address] [facility], [address] [place] [address]  [phone]      Medical Progress Note      NAME: Graham Pulliam   :  1990  MRM:  [id number]    Date/Time: 4/10/2021        Assessment / Plan:     28 yo F w/ hx of asthma, hypothyroidism, morbid obesity admitted for AHRF 2/2 COVID-19. Hospital course complicated by problems as listed below:    Acute respiratory failure with hypoxia: 2/2 COVID-19 PNA. Improving steadily and now down to 2L NC. Wean O2 as tolerated. AVINASH not on CPAP. Normal d-dimer argues against VTE    Pneumonia due to COVID-19 virus: s/p remdesivir, Actemra, and convalescent plasma. Cont dexamethasone     Mild bandemia: check UA. Low threshold for empiric abx. Follow temperature curve     Asthma: no wheezing. Cont Combivent, albuterol PRN     Thrombocytopenia: Likely 2/2 viral process. Resolved     Elevated LFTs: slowly worse. Normal alk phos / bilirubin suggests no biliary obstruction. May be 2/2 viral illness. Holding statin     Hyperlipidemia: statin on hold     Acquired hypothyroidism: cont LT4     Anxiety and depression: cont Ativan PRN      Obesity: Body mass index is 51.8 kg/m². Risk factor for severe disease for COVID. Would benefit from weight loss and dietary / lifestyle modifications     Bradycardia: better off Precedex. May have AVINASH. Echo showed preserved LVEF. Recommend outpatient cardiology follow up. Monitor on tele. Total time spent: 35 minutes  Time spent in the care of this patient including reviewing records, discussing with nursing and/or other providers on the treatment team, obtaining history and examining the patient, and discussing treatment plans.                   Care Plan discussed with: Patient, Nursing Staff and >50% of time spent in counseling and coordination of care    Discussed:  Care Plan and D/C Planning    Prophylaxis:  Lovenox    Disposition:  Home w/Family         Subjective:     Chief Complaint:  Follow up COVID    Chart/notes/labs/studies reviewed, patient examined. Feels better. Wondering when she can go home. No CP or SOB. No F/C          Objective:       Vitals:        Last 24hrs VS reviewed since prior progress note. Most recent are:    Visit Vitals  /81 (BP 1 Location: Right upper arm, BP Patient Position: Sitting)   Pulse 64   Temp 97.7 °F (36.5 °C)   Resp 14   Ht 5' 3\" (1.6 m)   Wt 132.6 kg (292 lb 6.4 oz)   SpO2 98%   BMI 51.80 kg/m²     SpO2 Readings from Last 6 Encounters:   04/10/21 98%    O2 Flow Rate (L/min): 3 l/min       Intake/Output Summary (Last 24 hours) at 4/10/2021 0800  Last data filed at 4/9/2021 2000  Gross per 24 hour   Intake 480 ml   Output    Net 480 ml          Exam:     Physical Exam:    Gen:  Obese. Well-developed, well-nourished, in no acute distress. Pleasant   HEENT:  Atraumatic, normocephalic. Sclerae nonicteric, hearing intact to voice  Neck:  Supple, without apparent masses. Resp:  No accessory muscle use, mild increase in WOB. Mildly diminished without wheezes, rales, or rhonchi  Card: HR 60s, without m/r/g. No LE edema  Abd:  +bowel sounds, soft, NTTP, nondistended. No HSM  Neuro: Face symmetric, speech fluent, follows commands appropriately, no focal weakness or numbness  Psych:  Alert, oriented x 3.  Good insight     Medications Reviewed: (see below)    Lab Data Reviewed: (see below)    ______________________________________________________________________    Medications:     Current Facility-Administered Medications   Medication Dose Route Frequency    pantoprazole (PROTONIX) tablet 40 mg  40 mg Oral ACB    levothyroxine (SYNTHROID) tablet 88 mcg  88 mcg Oral 6am    glucose chewable tablet 16 g  4 Tab Oral PRN    dextrose (D50W) injection syrg 12.5-25 g  12.5-25 g IntraVENous PRN    glucagon (GLUCAGEN) injection 1 mg  1 mg IntraMUSCular PRN    insulin lispro (HUMALOG) injection   SubCUTAneous Q6H    albuterol (PROVENTIL HFA, VENTOLIN HFA, PROAIR HFA) inhaler 2 Puff  2 Puff Inhalation Q4H PRN    Or    albuterol (PROVENTIL VENTOLIN) nebulizer solution 2.5 mg  2.5 mg Nebulization Q4H PRN    budesonide-formoteroL (SYMBICORT) 160-4.5 mcg/actuation HFA inhaler 2 Puff  2 Puff Inhalation BID RT    Or    arformoterol 15 mcg/budesonide 0.5 mg neb solution   Nebulization BID RT    ipratropium-albuterol (COMBIVENT RESPIMAT) 20 mcg-100 mcg inhalation spray  1 Puff Inhalation Q6HWA RT    Or    albuterol-ipratropium (DUO-NEB) 2.5 MG-0.5 MG/3 ML  3 mL Nebulization Q6HWA RT    ipratropium-albuterol (COMBIVENT RESPIMAT) 20 mcg-100 mcg inhalation spray  1 Puff Inhalation Q4H PRN    Or    albuterol-ipratropium (DUO-NEB) 2.5 MG-0.5 MG/3 ML  3 mL Nebulization Q4H PRN    LORazepam (ATIVAN) injection 0.5 mg  0.5 mg IntraVENous Q6H PRN    dexamethasone (DECADRON) 4 mg/mL injection 6 mg  6 mg IntraVENous Q24H    0.9% sodium chloride infusion 250 mL  250 mL IntraVENous PRN    morphine injection 2 mg  2 mg IntraVENous Q4H PRN    zolpidem (AMBIEN) tablet 5 mg  5 mg Oral QHS PRN    0.9% sodium chloride infusion 250 mL  250 mL IntraVENous PRN    hydrOXYzine HCL (ATARAX) tablet 10 mg  10 mg Oral TID PRN    HYDROcodone-chlorpheniramine (TUSSIONEX) oral suspension 5 mL  5 mL Oral Q12H PRN    guaiFENesin ER (MUCINEX) tablet 1,200 mg  1,200 mg Oral BID    enoxaparin (LOVENOX) injection 60 mg  60 mg SubCUTAneous Q12H    benzonatate (TESSALON) capsule 100 mg  100 mg Oral TID PRN    guaiFENesin-dextromethorphan (ROBITUSSIN DM) 100-10 mg/5 mL syrup 5 mL  5 mL Oral Q6H PRN    morphine IR (MS IR) tablet 15 mg  15 mg Oral Q4H PRN    [Held by provider] rosuvastatin (CRESTOR) tablet 20 mg  20 mg Oral DAILY    sodium chloride (NS) flush 5-40 mL  5-40 mL IntraVENous Q8H    sodium chloride (NS) flush 5-40 mL  5-40 mL IntraVENous PRN    acetaminophen (TYLENOL) tablet 650 mg  650 mg Oral Q6H PRN    Or    acetaminophen (TYLENOL) suppository 650 mg  650 mg Rectal Q6H PRN    polyethylene glycol (MIRALAX) packet 17 g  17 g Oral DAILY PRN    promethazine (PHENERGAN) tablet 12.5 mg  12.5 mg Oral Q6H PRN    Or    ondansetron (ZOFRAN) injection 4 mg  4 mg IntraVENous Q6H PRN    ascorbic acid (vitamin C) (VITAMIN C) tablet 500 mg  500 mg Oral DAILY    zinc gluconate tablet 50 mg  50 mg Oral DAILY            Lab Review:     Recent Labs     04/10/21  0345 04/09/21  0332 04/08/21  0533   WBC 6.1 5.5 5.7   HGB 13.0 11.9 12.0   HCT 42.0 37.9 37.5    322 339     Recent Labs     04/10/21  0345 04/09/21  0332 04/08/21  0533    140 140   K 3.9 3.7 3.7    105 105   CO2 26 29 30   GLU 93 80 77   BUN 16 16 16   CREA 0.71 0.64 0.71   CA 9.0 8.7 8.7   MG 2.5* 2.4 2.3   PHOS 3.9  --  2.9   ALB 3.7 3.5 3.4*   * 134* 88*     No components found for: GLPOC  No results for input(s): PH, PCO2, PO2, HCO3, FIO2 in the last 72 hours. No results for input(s): INR, INREXT, INREXT in the last 72 hours.   No results found for: SDES  Lab Results   Component Value Date/Time    Culture result: No growth (<1,000 CFU/ML) 04/04/2021 03:28 PM              ___________________________________________________    Attending Physician: Roxane Rodriguez MD

## 2021-04-10 NOTE — PROGRESS NOTES
Bedside and Verbal shift change report given to MIKA RN  (oncoming nurse) by Diya Magaña  (offgoing nurse). Report included the following information SBAR, Kardex and Recent Results. .    This patient was assisted with Intentional Toileting every 2 hours during this shift. Documentation of ambulation and output reflected on Flowsheet. .. Bedside and Verbal shift change report given to [address] [place] [address]  (oncoming nurse) by Miguel Rivero RN  (offgoing nurse). Report included the following information SBAR, Kardex and Recent Results.

## 2021-04-11 VITALS
OXYGEN SATURATION: 94 % | HEART RATE: 70 BPM | SYSTOLIC BLOOD PRESSURE: 144 MMHG | RESPIRATION RATE: 18 BRPM | TEMPERATURE: 98.1 F | BODY MASS INDEX: 51.81 KG/M2 | HEIGHT: 63 IN | DIASTOLIC BLOOD PRESSURE: 79 MMHG | WEIGHT: 292.4 LBS

## 2021-04-11 LAB
ALBUMIN SERPL-MCNC: 3.4 G/DL (ref 3.5–5)
ALBUMIN/GLOB SERPL: 1 {RATIO} (ref 1.1–2.2)
ALP SERPL-CCNC: 52 U/L (ref 45–117)
ALT SERPL-CCNC: 137 U/L (ref 12–78)
ANION GAP SERPL CALC-SCNC: 6 MMOL/L (ref 5–15)
APPEARANCE UR: ABNORMAL
AST SERPL-CCNC: 34 U/L (ref 15–37)
BACTERIA URNS QL MICRO: NEGATIVE /HPF
BASOPHILS # BLD: 0 K/UL (ref 0–0.1)
BASOPHILS NFR BLD: 0 % (ref 0–1)
BILIRUB SERPL-MCNC: 0.5 MG/DL (ref 0.2–1)
BILIRUB UR QL: NEGATIVE
BUN SERPL-MCNC: 20 MG/DL (ref 6–20)
BUN/CREAT SERPL: 30 (ref 12–20)
CALCIUM SERPL-MCNC: 8.7 MG/DL (ref 8.5–10.1)
CAOX CRY URNS QL MICRO: ABNORMAL
CHLORIDE SERPL-SCNC: 106 MMOL/L (ref 97–108)
CO2 SERPL-SCNC: 27 MMOL/L (ref 21–32)
COLOR UR: ABNORMAL
CREAT SERPL-MCNC: 0.67 MG/DL (ref 0.55–1.02)
CRP SERPL-MCNC: <0.29 MG/DL (ref 0–0.6)
D DIMER PPP FEU-MCNC: 0.41 MG/L FEU (ref 0–0.65)
DIFFERENTIAL METHOD BLD: ABNORMAL
EOSINOPHIL # BLD: 0 K/UL (ref 0–0.4)
EOSINOPHIL NFR BLD: 0 % (ref 0–7)
EPITH CASTS URNS QL MICRO: ABNORMAL /LPF
ERYTHROCYTE [DISTWIDTH] IN BLOOD BY AUTOMATED COUNT: 14.3 % (ref 11.5–14.5)
GLOBULIN SER CALC-MCNC: 3.5 G/DL (ref 2–4)
GLUCOSE BLD STRIP.AUTO-MCNC: 100 MG/DL (ref 65–100)
GLUCOSE BLD STRIP.AUTO-MCNC: 88 MG/DL (ref 65–100)
GLUCOSE SERPL-MCNC: 119 MG/DL (ref 65–100)
GLUCOSE UR STRIP.AUTO-MCNC: NEGATIVE MG/DL
HCT VFR BLD AUTO: 38.8 % (ref 35–47)
HGB BLD-MCNC: 11.9 G/DL (ref 11.5–16)
HGB UR QL STRIP: NEGATIVE
IMM GRANULOCYTES # BLD AUTO: 0 K/UL
IMM GRANULOCYTES NFR BLD AUTO: 0 %
KETONES UR QL STRIP.AUTO: NEGATIVE MG/DL
LEUKOCYTE ESTERASE UR QL STRIP.AUTO: NEGATIVE
LYMPHOCYTES # BLD: 2 K/UL (ref 0.8–3.5)
LYMPHOCYTES NFR BLD: 26 % (ref 12–49)
MAGNESIUM SERPL-MCNC: 2.4 MG/DL (ref 1.6–2.4)
MCH RBC QN AUTO: 26.6 PG (ref 26–34)
MCHC RBC AUTO-ENTMCNC: 30.7 G/DL (ref 30–36.5)
MCV RBC AUTO: 86.6 FL (ref 80–99)
MONOCYTES # BLD: 0.2 K/UL (ref 0–1)
MONOCYTES NFR BLD: 3 % (ref 5–13)
NEUTS BAND NFR BLD MANUAL: 1 % (ref 0–6)
NEUTS SEG # BLD: 5.3 K/UL (ref 1.8–8)
NEUTS SEG NFR BLD: 70 % (ref 32–75)
NITRITE UR QL STRIP.AUTO: NEGATIVE
NRBC # BLD: 0 K/UL (ref 0–0.01)
NRBC BLD-RTO: 0 PER 100 WBC
PH UR STRIP: 6.5 [PH] (ref 5–8)
PLATELET # BLD AUTO: 263 K/UL (ref 150–400)
PMV BLD AUTO: 10.3 FL (ref 8.9–12.9)
POTASSIUM SERPL-SCNC: 4.2 MMOL/L (ref 3.5–5.1)
PROT SERPL-MCNC: 6.9 G/DL (ref 6.4–8.2)
PROT UR STRIP-MCNC: NEGATIVE MG/DL
RBC # BLD AUTO: 4.48 M/UL (ref 3.8–5.2)
RBC #/AREA URNS HPF: ABNORMAL /HPF (ref 0–5)
RBC MORPH BLD: ABNORMAL
SERVICE CMNT-IMP: NORMAL
SERVICE CMNT-IMP: NORMAL
SODIUM SERPL-SCNC: 139 MMOL/L (ref 136–145)
SP GR UR REFRACTOMETRY: 1.03 (ref 1–1.03)
UA: UC IF INDICATED,UAUC: ABNORMAL
UROBILINOGEN UR QL STRIP.AUTO: 1 EU/DL (ref 0.2–1)
WBC # BLD AUTO: 7.5 K/UL (ref 3.6–11)
WBC URNS QL MICRO: ABNORMAL /HPF (ref 0–4)

## 2021-04-11 PROCEDURE — 83735 ASSAY OF MAGNESIUM: CPT

## 2021-04-11 PROCEDURE — 85025 COMPLETE CBC W/AUTO DIFF WBC: CPT

## 2021-04-11 PROCEDURE — 74011250637 HC RX REV CODE- 250/637: Performed by: INTERNAL MEDICINE

## 2021-04-11 PROCEDURE — 77010033678 HC OXYGEN DAILY

## 2021-04-11 PROCEDURE — 94618 PULMONARY STRESS TESTING: CPT

## 2021-04-11 PROCEDURE — 82962 GLUCOSE BLOOD TEST: CPT

## 2021-04-11 PROCEDURE — 86140 C-REACTIVE PROTEIN: CPT

## 2021-04-11 PROCEDURE — 74011250636 HC RX REV CODE- 250/636: Performed by: INTERNAL MEDICINE

## 2021-04-11 PROCEDURE — 94760 N-INVAS EAR/PLS OXIMETRY 1: CPT

## 2021-04-11 PROCEDURE — 81001 URINALYSIS AUTO W/SCOPE: CPT

## 2021-04-11 PROCEDURE — 94664 DEMO&/EVAL PT USE INHALER: CPT

## 2021-04-11 PROCEDURE — 94640 AIRWAY INHALATION TREATMENT: CPT

## 2021-04-11 PROCEDURE — 36415 COLL VENOUS BLD VENIPUNCTURE: CPT

## 2021-04-11 PROCEDURE — 85379 FIBRIN DEGRADATION QUANT: CPT

## 2021-04-11 PROCEDURE — 80053 COMPREHEN METABOLIC PANEL: CPT

## 2021-04-11 RX ORDER — DEXAMETHASONE 6 MG/1
6 TABLET ORAL
Qty: 2 TAB | Refills: 0 | Status: SHIPPED | OUTPATIENT
Start: 2021-04-11 | End: 2021-04-13

## 2021-04-11 RX ORDER — ROSUVASTATIN CALCIUM 20 MG/1
20 TABLET, COATED ORAL DAILY
Qty: 30 TAB | Refills: 0 | Status: SHIPPED
Start: 2021-04-11

## 2021-04-11 RX ADMIN — PANTOPRAZOLE SODIUM 40 MG: 40 TABLET, DELAYED RELEASE ORAL at 09:44

## 2021-04-11 RX ADMIN — Medication 50 MG: at 09:45

## 2021-04-11 RX ADMIN — BUDESONIDE AND FORMOTEROL FUMARATE DIHYDRATE 2 PUFF: 160; 4.5 AEROSOL RESPIRATORY (INHALATION) at 07:39

## 2021-04-11 RX ADMIN — DEXAMETHASONE SODIUM PHOSPHATE 6 MG: 4 INJECTION, SOLUTION INTRAMUSCULAR; INTRAVENOUS at 12:09

## 2021-04-11 RX ADMIN — GUAIFENESIN 1200 MG: 600 TABLET, EXTENDED RELEASE ORAL at 09:44

## 2021-04-11 RX ADMIN — ENOXAPARIN SODIUM 60 MG: 60 INJECTION SUBCUTANEOUS at 09:45

## 2021-04-11 RX ADMIN — OXYCODONE HYDROCHLORIDE AND ACETAMINOPHEN 500 MG: 500 TABLET ORAL at 09:45

## 2021-04-11 RX ADMIN — IPRATROPIUM BROMIDE AND ALBUTEROL 1 PUFF: 20; 100 SPRAY, METERED RESPIRATORY (INHALATION) at 13:51

## 2021-04-11 RX ADMIN — IPRATROPIUM BROMIDE AND ALBUTEROL 1 PUFF: 20; 100 SPRAY, METERED RESPIRATORY (INHALATION) at 07:39

## 2021-04-11 RX ADMIN — LEVOTHYROXINE SODIUM 88 MCG: 0.09 TABLET ORAL at 05:29

## 2021-04-11 RX ADMIN — Medication 10 ML: at 15:00

## 2021-04-11 RX ADMIN — Medication 10 ML: at 05:32

## 2021-04-11 NOTE — PROGRESS NOTES
Doing well. Feels great. No wheezing on exam. No increase in WOB. Did relatively well with ambulatory pulsox test. Has finger pulsometer at home     Wants to go home. Appears stable to do so.  Close outpatient follow up advised

## 2021-04-11 NOTE — PROGRESS NOTES
0500 Patient arrived to unit on bed with   PCU staff member. Patient denies any pain or discomfort, just chest pain when coughing. Patient oriented to room and callbell. No needs voiced. No acute distress noted. Callbell within reach.

## 2021-04-11 NOTE — PROGRESS NOTES
04/11/21 1350 04/11/21 1352 04/11/21 1354   RT Walking Oximetry   Stage Resting (Room Air) During Walk (Room Air) During Walk (Room Air)   SpO2 94 % 91 % 89 %   HR 84 bpm 99 bpm 119 bpm   Rate of Dyspnea 0 0 1   Symptoms   (None noted)   (None) Chest Tightness   O2 Device None (Room air)  --  None (Room air)   O2 Flow Rate (L/min) 0 l/min 0 l/min 0 l/min   FIO2 (%) 21 % 21 % 21 %   Walk/Assistive Device   (None)  --   --       04/11/21 1356   RT Walking Oximetry   Stage Resting (Room Air)   SpO2 94 %   HR 91 bpm   Rate of Dyspnea 0   Symptoms Chest Tightness   O2 Device None (Room air)   O2 Flow Rate (L/min) 0 l/min   FIO2 (%) 21 %   Walk/Assistive Device  --

## 2021-04-11 NOTE — PROGRESS NOTES
Bedside and Verbal shift change report given to [employer] (oncoming nurse) by Sharon Espinal (offgoing nurse). Report included the following information SBAR, Kardex and Cardiac Rhythm NSR.

## 2021-04-11 NOTE — PROGRESS NOTES
3:58 PM  CM noted consult for PCP visit in 3-5 days. CM called and spoke to pt over the phone. She lives in [place] and plans to see her PCP and pulmonologist when she returns home though she doesn't know yet when that will be. CM discussed the importance of being seen to ensure she is continuing to improve. Discussed [facility] while she is in [facility], pt requested information in [place] paperwork, she would prefer to call.  Will add to AVS. Max Room

## 2021-04-11 NOTE — PROGRESS NOTES
Pt educated on discharge instructions, prescriptions and follow-up appointments. Pt verbalized understanding.

## 2021-04-11 NOTE — DISCHARGE INSTRUCTIONS
HOSPITALIST DISCHARGE INSTRUCTIONS  NAME: Kaye Rebolledo   :  1990   MRN:  [id number]     Date/Time:  2021 2:53 PM    ADMIT DATE: 3/31/2021     DISCHARGE DATE: 2021     PRINCIPAL DISCHARGE DIAGNOSES:  COVID    MEDICATIONS:  · It is important that you take the medication exactly as they are prescribed. Note the changes and additions to your medications. Be sure you understand these changes before you are discharged today. · Keep your medication in the bottles provided by the pharmacist and keep a list of the medication names, dosages, and times to be taken in your wallet. · Do not take other medications without consulting your doctor. Pain Management: per above medications    What to do at Home    Recommended diet:  Resume previous diet    Recommended activity: Activity as tolerated    If you experience any of the following symptoms then please call your primary care physician or return to the emergency room if you cannot get hold of your doctor:  Fever, chills, chest pain, severe chest pain, shortness of breath, dizziness, weakness, or other severe concerning symptoms    Follow Up: Follow-up Information     Follow up With Specialties Details Why Contact Info    Follow up with a primary care physician within 3-5 days        [facility] [address].  In 2 weeks  [address]  [phone]            Information obtained by :  I understand that if any problems occur once I am at home I am to contact my physician. I understand and acknowledge receipt of the instructions indicated above.                                                                                                                                            Physician's or R.N.'s Signature                                                                  Date/Time Patient or Representative Signature                                                          Date/Time      CDC GUIDELINES  Discontinuing Home Isolation for Persons with COVID-19:  Persons with COVID-19 who have symptoms and were directed to care for themselves at home may discontinue isolation under the following conditions:  - At least 10 (20 days if you were hospitalized with severe illness) days have passed since symptom onset and  - At least 24 hours have passed since resolution of fever without the use of fever-reducing medications and  - Other symptoms have improved. [email or web address]    ====================================    Advance Care Planning  People with COVID-19 may have no symptoms, mild symptoms, such as fever, cough, and shortness of breath or they may have more severe illness, developing severe and fatal pneumonia. As a result, Advance Care Planning with attention to naming a health care decision maker (someone you trust to make healthcare decisions for you if you could not speak for yourself) and sharing other health care preferences is important BEFORE a possible health crisis. Please contact your Primary Care Provider to discuss Advance Care Planning. Preventing the Spread of Coronavirus Disease 2019 in Homes and Residential Communities  For the most recent information go to [email or web address]    Prevention steps for People with confirmed or suspected COVID-19 (including persons under investigation) who do not need to be hospitalized  and   People with confirmed COVID-19 who were hospitalized and determined to be medically stable to go home    Your healthcare provider and public health staff will evaluate whether you can be cared for at home.  If it is determined that you do not need to be hospitalized and can be isolated at home, you will be monitored by staff from your local or state health department. You should follow the prevention steps below until a healthcare provider or local or state health department says you can return to your normal activities. Stay home except to get medical care  People who are mildly ill with COVID-19 are able to isolate at home during their illness. You should restrict activities outside your home, except for getting medical care. Do not go to work, school, or public areas. Avoid using public transportation, ride-sharing, or taxis. Separate yourself from other people and animals in your home  People: As much as possible, you should stay in a specific room and away from other people in your home. Also, you should use a separate bathroom, if available. Animals: You should restrict contact with pets and other animals while you are sick with COVID-19, just like you would around other people. Although there have not been reports of pets or other animals becoming sick with COVID-19, it is still recommended that people sick with COVID-19 limit contact with animals until more information is known about the virus. When possible, have another member of your household care for your animals while you are sick. If you are sick with COVID-19, avoid contact with your pet, including petting, snuggling, being kissed or licked, and sharing food. If you must care for your pet or be around animals while you are sick, wash your hands before and after you interact with pets and wear a facemask. Call ahead before visiting your doctor  If you have a medical appointment, call the healthcare provider and tell them that you have or may have COVID-19. This will help the healthcare providers office take steps to keep other people from getting infected or exposed. Wear a facemask  You should wear a facemask when you are around other people (e.g., sharing a room or vehicle) or pets and before you enter a healthcare providers office.  If you are not able to wear a facemask (for example, because it causes trouble breathing), then people who live with you should not stay in the same room with you, or they should wear a facemask if they enter your room. Cover your coughs and sneezes  Cover your mouth and nose with a tissue when you cough or sneeze. Throw used tissues in a lined trash can. Immediately wash your hands with soap and water for at least 20 seconds or, if soap and water are not available, clean your hands with an alcohol-based hand  that contains at least 60% alcohol. Clean your hands often  Wash your hands often with soap and water for at least 20 seconds, especially after blowing your nose, coughing, or sneezing; going to the bathroom; and before eating or preparing food. If soap and water are not readily available, use an alcohol-based hand  with at least 60% alcohol, covering all surfaces of your hands and rubbing them together until they feel dry. Soap and water are the best option if hands are visibly dirty. Avoid touching your eyes, nose, and mouth with unwashed hands. Avoid sharing personal household items  You should not share dishes, drinking glasses, cups, eating utensils, towels, or bedding with other people or pets in your home. After using these items, they should be washed thoroughly with soap and water. Clean all high-touch surfaces everyday  High touch surfaces include counters, tabletops, doorknobs, bathroom fixtures, toilets, phones, keyboards, tablets, and bedside tables. Also, clean any surfaces that may have blood, stool, or body fluids on them. Use a household cleaning spray or wipe, according to the label instructions. Labels contain instructions for safe and effective use of the cleaning product including precautions you should take when applying the product, such as wearing gloves and making sure you have good ventilation during use of the product.     Monitor your symptoms  Seek prompt medical attention if your illness is worsening (e.g., difficulty breathing). Before seeking care, call your healthcare provider and tell them that you have, or are being evaluated for, COVID-19. Put on a facemask before you enter the facility. These steps will help the healthcare providers office to keep other people in the office or waiting room from getting infected or exposed. Ask your healthcare provider to call the local or state health department. Persons who are placed under active monitoring or facilitated self-monitoring should follow instructions provided by their local health department or occupational health professionals, as appropriate. When working with your local health department check their available hours. If you have a medical emergency and need to call 911, notify the dispatch personnel that you have, or are being evaluated for COVID-19. If possible, put on a facemask before emergency medical services arrive. Discontinuing home isolation  Patients with confirmed COVID-19 should remain under home isolation precautions until the risk of secondary transmission to others is thought to be low. The decision to discontinue home isolation precautions should be made on a case-by-case basis, in consultation with healthcare providers and state and local health departments.

## 2021-04-11 NOTE — DISCHARGE SUMMARY
Physician Discharge Summary     Patient ID:  Mae Delgado  [id number]  89 y.o.  1990    Admit date: 3/31/2021    Discharge date: 4/11/2021    Admission Diagnoses: Pneumonia due to COVID-19 virus [U07.1, J12.82]  COVID-19 [U07.1]  Acute respiratory failure ([address] [J96.00]    Principal Discharge Diagnoses:    COVID-19    OTHER PROBLEMS ADDRESSEDS  Principal Diagnosis Pneumonia due to COVID-19 virus                                            Principal Problem:    Pneumonia due to COVID-19 virus (3/31/2021)    Active Problems:    Hypokalemia (3/31/2021)      Thrombocytopenia ([address] (3/31/2021)      Obesity (3/31/2021)      Asthma (3/31/2021)      Hyperlipidemia (3/31/2021)      Acquired hypothyroidism (3/31/2021)      Anxiety and depression (3/31/2021)      COVID-19 (4/1/2021)      Acute respiratory failure ([address] (4/3/2021)       Patient Active Problem List   Diagnosis Code    Pneumonia due to COVID-19 virus U07.1, J12.82    Hypokalemia E87.6    Thrombocytopenia (HCC) D69.6    Obesity E66.9    Asthma J45.909    Hyperlipidemia E78.5    Acquired hypothyroidism E03.9    Anxiety and depression F41.9, F32.9    COVID-19 U07.1    Acute respiratory failure [facility]) J96.00         Hospital Course:   Ms. Axel Michele is a 28 yo F w/ hx of asthma, hypothyroidism, morbid obesity admitted for AHRF 2/2 COVID-19. Hospital course complicated by problems as listed below:     Acute respiratory failure with hypoxia: 2/2 COVID-19 PNA. Normal d-dimer argues against VTE. Improved nicely and weaned off O2. No home O2 needs per RT after ambulatory pulsox. AVINASH not on CPAP so would advise following up with pulmonology / sleep medicine.      Pneumonia due to COVID-19 virus: s/p remdesivir, Actemra, and convalescent plasma. Recovered nicely. Inflammatory marker now normal. Complete dexamethasone. Close outpatient follow up advised     Mild bandemia:  No fevers. Normalized. UA negative.     Asthma: no wheezing.  Cont Combivent, albuterol PRN     Thrombocytopenia: Likely 2/2 viral process. Resolved      Elevated LFTs: improved. Normal alk phos / bilirubin suggests no biliary obstruction. Likely from [facility]. Hold statin as instructed, repeat LFTs outpatient     Hyperlipidemia: statin on hold     Acquired hypothyroidism: cont LT4     Anxiety and depression: cont Ativan PRN      Obesity: Body mass index is 51.8 kg/m². Risk factor for severe disease for COVID. Would benefit from weight loss and dietary / lifestyle modifications     Bradycardia:  Improved off Precedex. May have AVINASH. Echo showed preserved LVEF. Recommend outpatient cardiology follow up if needed     Pt discharged in improved and stable condition. Procedures performed: see above    Imaging studies: see above  Cta Chest W Or W Wo Cont    Result Date: 4/2/2021  No pulmonary embolus. Diffuse pulmonary infiltrates compatible with Covid 19 pneumonia. Xr Chest Port    Result Date: 4/4/2021  Significant increase interstitial and airspace opacities bilaterally    Xr Chest Port    Result Date: 3/31/2021  Bilateral perihilar infiltrates. Consider atypical viral pneumonia. PCP: Other, Phys, MD    Consults: Pulmonary/Intensive care    Discharge Exam:  Patient Vitals for the past 24 hrs:   Temp Pulse Resp BP SpO2   04/11/21 1408     94 %   04/11/21 1351     94 %   04/11/21 1155 98.1 °F (36.7 °C) 70 18 (!) 144/79 96 %     GEN: NAD  CV: RRR  RESP: Appearing very comfortable on RA, speaking in full sentences w/o increased WOB. CTAB w/o wheezing, rales, rhonchi. Disposition: home    Patient Instructions:   Current Discharge Medication List      START taking these medications    Details   dexAMETHasone (DECADRON) 6 mg tablet Take 1 Tab by mouth Daily (before breakfast) for 2 days. Qty: 2 Tab, Refills: 0         CONTINUE these medications which have CHANGED    Details   rosuvastatin (Crestor) 20 mg tablet Take 1 Tab by mouth daily.  Do not resume this until you have your liver enzymes re-checked (mildly elevated)  Qty: 30 Tab, Refills: 0         CONTINUE these medications which have NOT CHANGED    Details   fluticasone propion-salmeteroL (Wixela Inhub) 500-50 mcg/dose diskus inhaler Take 1 Puff by inhalation every twelve (12) hours. hydrOXYzine HCL (ATARAX) 10 mg tablet Take 10 mg by mouth two (2) times daily as needed for Anxiety. albuterol-ipratropium (DUO-NEB) 2.5 mg-0.5 mg/3 ml nebu 3 mL by Nebulization route every four (4) hours as needed for Wheezing. phentermine (ADIPEX-P) 37.5 mg tablet Take 37.5 mg by mouth every morning. levothyroxine (SYNTHROID) 88 mcg tablet Take 88 mcg by mouth Daily (before breakfast). albuterol (PROVENTIL HFA, VENTOLIN HFA, PROAIR HFA) 90 mcg/actuation inhaler Take 2 Puffs by inhalation every six (6) hours as needed for Wheezing. STOP taking these medications       phentermine 37.5 mg capsule Comments:   Reason for Stopping:               Activity: See discharge instructions  Diet: See discharge instructions  Wound Care: See discharge instructions    Follow-up Information     Follow up With Specialties Details Why Contact Info    Follow up with a primary care physician within 3-5 days        [facility]  In 2 weeks  [address]  [facility] [address] [facility]   follow up with a primary care physician within 3-5days of discharge [address]  [phone]    Other, MD Carson    Patient can only remember the practice name and not the physician      [facility], In-Home Clinical Assessments  Call to make an appointment if you cannot get to your PCP in 3-5 days [facility] [address] [facility]  [address]          I spent 35 minutes on this discharge.     Signed:  Shira Frias MD  4/11/2021  2:56 PM

## 2021-04-11 NOTE — PROGRESS NOTES
Patients home oxygen assessment completed. Patient will not need oxygen for home use. 94%-84HR (resting RA)  91%-99HR (during walk RA)   89%-119HR (during walk RA)  94%-91HR (resting RA)  No signs or symptoms noted.

## 2021-04-12 ENCOUNTER — PATIENT OUTREACH (OUTPATIENT)
Dept: CASE MANAGEMENT | Age: 31
End: 2021-04-12

## 2021-04-13 ENCOUNTER — PATIENT OUTREACH (OUTPATIENT)
Dept: CASE MANAGEMENT | Age: 31
End: 2021-04-13

## 2021-04-13 NOTE — PROGRESS NOTES
Patient contacted regarding [id number] diagnosis\". Discussed COVID-19 related testing which was available at this time. Test results were positive. Patient informed of results, if available? yes     Care Transition Nurse contacted the patient by telephone to perform post discharge assessment. Call within 2 business days of discharge: Yes Verified name and  with patient as identifiers. Provided introduction to self, and explanation of the CTN/ACM role, and reason for call due to risk factors for infection and/or exposure to COVID-19. Symptoms reviewed with patient who verbalized the following symptoms: loss or taste or smell      Due to no new or worsening symptoms encounter was not routed to provider for escalation. Discussed follow-up appointments. If no appointment was previously scheduled, appointment scheduling offered:  [facility] follow up appointment(s): No future appointments. Non-[facility] follow up appointment(s): pt spoke with her PCP in [place] yesterday, and will follow up on return to [place]. Advance Care Planning:   Does patient have an Advance Directive:  patient declined education. Patient has following risk factors of: no known risk factors. CTN reviewed discharge instructions, medical action plan and red flags such as increased shortness of breath, increasing fever and signs of decompensation with patient who verbalized understanding. Discussed exposure protocols and quarantine with CDC Guidelines What to do if you are sick with coronavirus disease .  Patient was given an opportunity for questions and concerns. The patient agrees to contact the [facility] exposure line [phone], [facility] [address]  ([phone] and PCP office for questions related to their healthcare. CTN provided contact information for future needs.     Reviewed and educated patient on any new and changed medications related to discharge diagnosis     Was patient discharged with a pulse oximeter? yes Discussed and confirmed pulse oximeter discharge instructions and when to notify provider or seek emergency care. Patient/family/caregiver given information for [employer] and agrees to enroll no  . 14 day call based on severity of symptoms and risk factors.

## 2021-04-16 ENCOUNTER — VIRTUAL VISIT (OUTPATIENT)
Dept: FAMILY MEDICINE CLINIC | Age: 31
End: 2021-04-16
Payer: COMMERCIAL

## 2021-04-16 DIAGNOSIS — E03.9 ACQUIRED HYPOTHYROIDISM: ICD-10-CM

## 2021-04-16 DIAGNOSIS — E87.6 HYPOKALEMIA: ICD-10-CM

## 2021-04-16 DIAGNOSIS — F41.9 ANXIETY AND DEPRESSION: Primary | ICD-10-CM

## 2021-04-16 DIAGNOSIS — F32.A ANXIETY AND DEPRESSION: Primary | ICD-10-CM

## 2021-04-16 PROCEDURE — 99204 OFFICE O/P NEW MOD 45 MIN: CPT | Performed by: STUDENT IN AN ORGANIZED HEALTH CARE EDUCATION/TRAINING PROGRAM

## 2021-04-16 RX ORDER — MONTELUKAST SODIUM 10 MG/1
10 TABLET ORAL DAILY
COMMUNITY

## 2021-04-16 RX ORDER — CALCIUM CARBONATE 200(500)MG
1 TABLET,CHEWABLE ORAL DAILY
COMMUNITY

## 2021-04-16 RX ORDER — HYDROXYZINE 25 MG/1
25 TABLET, FILM COATED ORAL
Qty: 90 TAB | Refills: 1 | Status: SHIPPED | OUTPATIENT
Start: 2021-04-16

## 2021-04-16 RX ORDER — ACETAMINOPHEN 325 MG/1
650 TABLET ORAL
COMMUNITY

## 2021-04-16 RX ORDER — DESVENLAFAXINE SUCCINATE 50 MG/1
50 TABLET, EXTENDED RELEASE ORAL DAILY
COMMUNITY

## 2021-04-16 NOTE — PROGRESS NOTES
Shameka Yang  27 y.o. female  1990  [facility] [address] [facility] [address]  [id number]   [address]:    Telemedicine Progress Note  Kiara Dyer MD       Encounter Date and Time: April 16, 2021 at 1:03 PM    Consent:  She and/or the health care decision maker is aware that that she may receive a bill for this telephone service, depending on her insurance coverage, and has provided verbal consent to proceed: Yes    Chief Complaint   Patient presents with    New Patient    Anxiety    Concern For COVID-19 (Coronavirus)     History of Present Illness   Shameka Yang is a 27 y.o. female was evaluated by synchronous (real-time) audio-video technology from home, through the [facility] Portal.    Ms Asia Wang is a new patient with hx of HLD, Asthma, morbid obesity, anxiety and depression, hypothyroidism and AVINASH not on CPAP admitted at [facility] from 3/31 - 4/11 for COVID PNA with AHRF s/p remdesivir, actemra, convalescent plasma and dexamethasone. Recently moved to [facility] from [place] to care for her grandmother and will be returning back to [place] at the end of the month. HLD  - takes crestor 20mg daily    Asthma  - Wixela 500-50mcg/dose BID  - duonebs Q4hr PRN for wheezing  - followed by a pulmonary physician in [place]    Hypothyroidism  - Synthroid 88mcg    Anxiety  - Atarax 10mg BID PRN  - taking 20mg QHS for insomnia  - having severe anxiety since being admitted to the ICU    Review of Systems   Review of Systems   Constitutional: Negative for chills and fever. Respiratory: Negative for cough. Cardiovascular: Negative for chest pain and palpitations. Musculoskeletal: Negative for back pain and myalgias. Neurological: Negative for dizziness and headaches. Psychiatric/Behavioral: Negative for depression, hallucinations, substance abuse and suicidal ideas. The patient has insomnia. The patient is not nervous/anxious. Vitals/Objective:     General: alert, cooperative, no distress, appears stated age, morbidly obese   Mental  status: mental status: alert, oriented to person, place, and time, normal mood, behavior, speech, dress, motor activity, and thought processes, affect appropriate to mood   Resp: resp: normal effort and no respiratory distress   Neuro: neuro: no gross deficits   Skin: skin: no discoloration or lesions of concern on visible areas   Due to this being a TeleHealth evaluation, many elements of the physical examination are unable to be assessed. Assessment and Plan:   Time-based coding, delete if not needed: I spent at least 30 minutes with this new patient, and >50% of the time was spent counseling and/or coordinating care regarding covid recovery. Currently experiencing chest pressure, tightness as well as anxiety manifesting as insomnia after ICU admission. Has a psychiatrist she is in contact with in [place] for longterm care. For now will increase atarax dose or insomnia. Eleated LFTs during admission so will repeat to trend and ensure thyroid function has returned to normal.    1. Anxiety and depression  - hydrOXYzine HCL (ATARAX) 25 mg tablet; Take 1 Tab by mouth three (3) times daily as needed for Anxiety or Sleep. Dispense: 90 Tab; Refill: 1    2. Acquired hypothyroidism  - TSH 3RD GENERATION; Future    3. Hypokalemia  - METABOLIC PANEL, COMPREHENSIVE; Future      Time spent in direct conversation with the patient to include medical condition(s) discussed, assessment and treatment plan:       We discussed the expected course, resolution and complications of the diagnosis(es) in detail. Medication risks, benefits, costs, interactions, and alternatives were discussed as indicated. I advised her to contact the office if her condition worsens, changes or fails to improve as anticipated. She expressed understanding with the diagnosis(es) and plan.  Patient understands that this encounter was a temporary measure, and the importance of further follow up and examination was emphasized. Patient verbalized understanding. Patient informed to follow up: Follow-up and Dispositions  ·   Return for labs  @ 11A. Routing History          Electronically Signed: Jorge Alberto Jeff MD    Providers location when delivering service: home    CPT Codes 05719-44753 for Established Patients may apply to this Telehealth Visit. POS code: 18. Modifier GT      Pursuant to the emergency declaration under the [address] [place] [address] [facility] authority and the [employer] Act, this Virtual  Visit was conducted, with patient's consent, to reduce the patient's risk of exposure to COVID-19 and provide continuity of care for an established patient. Services were provided through a video synchronous discussion virtually to substitute for in-person clinic visit. History   Patients past medical, surgical and family histories were reviewed and updated.       Past Medical History:   Diagnosis Date    Asthma     Depression     psychiatrist in [place]    Hypercholesterolemia     Hypertension     Pre-eclampsia     Thyroid disease      Past Surgical History:   Procedure Laterality Date    HX CERVICAL FUSION      C5C6     HX  SECTION  2014     Family History   Problem Relation Age of Onset    Diabetes Mother     Diabetes Father     Heart Disease Father     Diabetes Maternal Grandmother      Social History     Socioeconomic History    Marital status:      Spouse name: Not on file    Number of children: Not on file    Years of education: Not on file    Highest education level: Not on file   Occupational History    Not on file   Social Needs    Financial resource strain: Not on file    Food insecurity     Worry: Not on file     Inability: Not on file    Transportation needs     Medical: Not on file Non-medical: Not on file   Tobacco Use    Smoking status: Never Smoker    Smokeless tobacco: Never Used   Substance and Sexual Activity    Alcohol use: Yes     Frequency: Monthly or less     Drinks per session: 1 or 2     Binge frequency: Never    Drug use: Never    Sexual activity: Not Currently     Birth control/protection: I.U.D. Comment: very    Lifestyle    Physical activity     Days per week: Not on file     Minutes per session: Not on file    Stress: Not on file   Relationships    Social connections     Talks on phone: Not on file     Gets together: Not on file     Attends [employer] service: Not on file     Active member of club or organization: Not on file     Attends meetings of clubs or organizations: Not on file     Relationship status: Not on file    Intimate partner violence     Fear of current or ex partner: Not on file     Emotionally abused: Not on file     Physically abused: Not on file     Forced sexual activity: Not on file   Other Topics Concern    Not on file   Social History Narrative    Not on file     Patient Active Problem List   Diagnosis Code    Pneumonia due to COVID-19 virus U07.1, J12.82    Hypokalemia E87.6    Thrombocytopenia ([facility] [address] D69.6    Obesity E66.9    Asthma J45.909    Hyperlipidemia E78.5    Acquired hypothyroidism E03.9    Anxiety and depression F41.9, F32.9    COVID-19 U07.1    Acute respiratory failure (HCC) J96.00          Current Medications/Allergies   Medications and Allergies reviewed:    Current Outpatient Medications   Medication Sig Dispense Refill    montelukast (Singulair) 10 mg tablet Take 10 mg by mouth daily.  acetaminophen (TylenoL) 325 mg tablet Take 650 mg by mouth every four (4) hours as needed for Pain.  calcium carbonate (TUMS) 200 mg calcium (500 mg) chew Take 1 Tab by mouth daily.  levonorgestreL (MIRENA) 20 mcg/24 hours (6 yrs) 52 mg IUD 1 Device by IntraUTERine route once.       hydrOXYzine HCL (ATARAX) 25 mg tablet Take 1 Tab by mouth three (3) times daily as needed for Anxiety or Sleep. 90 Tab 1    desvenlafaxine succinate (PRISTIQ) 50 mg ER tablet Take 50 mg by mouth daily.  fluticasone propion-salmeteroL (Wixela Inhub) 500-50 mcg/dose diskus inhaler Take 1 Puff by inhalation every twelve (12) hours.  albuterol-ipratropium (DUO-NEB) 2.5 mg-0.5 mg/3 ml nebu 3 mL by Nebulization route every four (4) hours as needed for Wheezing.  levothyroxine (SYNTHROID) 88 mcg tablet Take 88 mcg by mouth Daily (before breakfast).  albuterol (PROVENTIL HFA, VENTOLIN HFA, PROAIR HFA) 90 mcg/actuation inhaler Take 2 Puffs by inhalation every six (6) hours as needed for Wheezing.  rosuvastatin (Crestor) 20 mg tablet Take 1 Tab by mouth daily. Do not resume this until you have your liver enzymes re-checked (mildly elevated) 30 Tab 0    phentermine (ADIPEX-P) 37.5 mg tablet Take 37.5 mg by mouth every morning.        Allergies   Allergen Reactions    Codeine Nausea and Vomiting

## 2021-04-19 ENCOUNTER — LAB ONLY (OUTPATIENT)
Dept: FAMILY MEDICINE CLINIC | Age: 31
End: 2021-04-19

## 2021-04-19 DIAGNOSIS — E03.9 ACQUIRED HYPOTHYROIDISM: ICD-10-CM

## 2021-04-19 DIAGNOSIS — E87.6 HYPOKALEMIA: ICD-10-CM

## 2021-04-20 LAB
ALB/GLOBRATIO, 58C: 1.4 (CALC) (ref 1–2.5)
ALBUMIN SERPL-MCNC: 4 G/DL (ref 3.6–5.1)
ALKALINE PHOSPHATASE, TOTAL, 25002000: 51 U/L (ref 31–125)
ALT SERPL-CCNC: 63 U/L (ref 6–29)
AST SERPL W P-5'-P-CCNC: 28 U/L (ref 10–30)
BILIRUB SERPL-MCNC: 0.7 MG/DL (ref 0.2–1.2)
BUN SERPL-MCNC: 22 MG/DL (ref 7–25)
BUN/CREATININE RATIO,BUCR: ABNORMAL (CALC) (ref 6–22)
CALCIUM SERPL-MCNC: 9.1 MG/DL (ref 8.6–10.2)
CHLORIDE SERPL-SCNC: 107 MMOL/L (ref 98–110)
CO2 SERPL-SCNC: 26 MMOL/L (ref 20–32)
CREAT SERPL-MCNC: 0.86 MG/DL (ref 0.5–1.1)
GLOBULIN,GLOB: 2.9 G/DL (CALC) (ref 1.9–3.7)
GLUCOSE SERPL-MCNC: 83 MG/DL (ref 65–99)
POTASSIUM SERPL-SCNC: 4.3 MMOL/L (ref 3.5–5.3)
PROT SERPL-MCNC: 6.9 G/DL (ref 6.1–8.1)
SODIUM SERPL-SCNC: 141 MMOL/L (ref 135–146)
TSH SERPL DL<=0.005 MIU/L-ACNC: 3.86 MIU/L

## 2021-04-23 ENCOUNTER — PATIENT OUTREACH (OUTPATIENT)
Dept: CASE MANAGEMENT | Age: 31
End: 2021-04-23

## 2021-04-23 NOTE — PROGRESS NOTES
Patient resolved from Transition of Care episode on 4/11  Patient/family has been provided the following resources and education related to COVID-19:                         Signs, symptoms and red flags related to COVID-19            CDC exposure and quarantine guidelines            [facility] exposure contact - [phone]            Contact for their local Department of Health                 Patient currently reports that the following symptoms have improved:  still fatigues easily with some chest pain, Has appointment May 3 with her pulmonologist in [place]     No further outreach scheduled with this CTN/ACM. Episode of Care resolved. Patient has this CTN/ACM contact information if future needs arise.

## 2022-03-18 PROBLEM — J12.82 PNEUMONIA DUE TO COVID-19 VIRUS: Status: ACTIVE | Noted: 2021-03-31

## 2022-03-18 PROBLEM — E78.5 HYPERLIPIDEMIA: Status: ACTIVE | Noted: 2021-03-31

## 2022-03-18 PROBLEM — U07.1 PNEUMONIA DUE TO COVID-19 VIRUS: Status: ACTIVE | Noted: 2021-03-31

## 2022-03-19 PROBLEM — E87.6 HYPOKALEMIA: Status: ACTIVE | Noted: 2021-03-31

## 2022-03-19 PROBLEM — F41.9 ANXIETY AND DEPRESSION: Status: ACTIVE | Noted: 2021-03-31

## 2022-03-19 PROBLEM — J45.909 ASTHMA: Status: ACTIVE | Noted: 2021-03-31

## 2022-03-19 PROBLEM — E03.9 ACQUIRED HYPOTHYROIDISM: Status: ACTIVE | Noted: 2021-03-31

## 2022-03-19 PROBLEM — D69.6 THROMBOCYTOPENIA (HCC): Status: ACTIVE | Noted: 2021-03-31

## 2022-03-19 PROBLEM — F32.A ANXIETY AND DEPRESSION: Status: ACTIVE | Noted: 2021-03-31

## 2022-03-20 PROBLEM — J96.00 ACUTE RESPIRATORY FAILURE (HCC): Status: ACTIVE | Noted: 2021-04-03

## 2022-03-20 PROBLEM — U07.1 COVID-19: Status: ACTIVE | Noted: 2021-04-01

## 2022-03-20 PROBLEM — E66.9 OBESITY: Status: ACTIVE | Noted: 2021-03-31

## 2023-05-15 RX ORDER — DESVENLAFAXINE SUCCINATE 50 MG/1
50 TABLET, EXTENDED RELEASE ORAL DAILY
COMMUNITY

## 2023-05-15 RX ORDER — LEVOTHYROXINE SODIUM 88 UG/1
88 TABLET ORAL
COMMUNITY

## 2023-05-15 RX ORDER — UREA 10 %
1 LOTION (ML) TOPICAL DAILY
COMMUNITY

## 2023-05-15 RX ORDER — IPRATROPIUM BROMIDE AND ALBUTEROL SULFATE 2.5; .5 MG/3ML; MG/3ML
3 SOLUTION RESPIRATORY (INHALATION) EVERY 4 HOURS PRN
COMMUNITY

## 2023-05-15 RX ORDER — FLUTICASONE PROPIONATE AND SALMETEROL 500; 50 UG/1; UG/1
1 POWDER RESPIRATORY (INHALATION) EVERY 12 HOURS
COMMUNITY

## 2023-05-15 RX ORDER — PHENTERMINE HYDROCHLORIDE 37.5 MG/1
37.5 TABLET ORAL
COMMUNITY

## 2023-05-15 RX ORDER — MONTELUKAST SODIUM 10 MG/1
10 TABLET ORAL DAILY
COMMUNITY

## 2023-05-15 RX ORDER — ROSUVASTATIN CALCIUM 20 MG/1
20 TABLET, COATED ORAL DAILY
COMMUNITY
Start: 2021-04-11

## 2023-05-15 RX ORDER — ALBUTEROL SULFATE 90 UG/1
2 AEROSOL, METERED RESPIRATORY (INHALATION) EVERY 6 HOURS PRN
COMMUNITY

## 2023-05-15 RX ORDER — ACETAMINOPHEN 325 MG/1
650 TABLET ORAL EVERY 4 HOURS PRN
COMMUNITY

## 2023-05-15 RX ORDER — HYDROXYZINE HYDROCHLORIDE 25 MG/1
25 TABLET, FILM COATED ORAL 3 TIMES DAILY PRN
COMMUNITY
Start: 2021-04-16